# Patient Record
Sex: FEMALE | Race: WHITE | NOT HISPANIC OR LATINO | Employment: OTHER | URBAN - METROPOLITAN AREA
[De-identification: names, ages, dates, MRNs, and addresses within clinical notes are randomized per-mention and may not be internally consistent; named-entity substitution may affect disease eponyms.]

---

## 2017-01-09 DIAGNOSIS — I10 ESSENTIAL HYPERTENSION: ICD-10-CM

## 2017-01-09 NOTE — TELEPHONE ENCOUNTER
----- Message from Merissa Livingston sent at 1/9/2017 10:21 AM CST -----  Patient is requesting a medication refill.     RX name: olmesartan (BENICAR) 5 MG Tab  Quantity: 90 day supply  Directions: Twice a day.    RX name: ibandronate (BONIVA) 150 mg tablet  Quantity: 3 month supply  Directions: Take 1 tablet every 30 days.    RX name:   celecoxib (CELEBREX) 200 MG capsule   Quantity: 90 day supply  Directions: Once a day      Pharmacy name: [facility]      Phone number where patient can be reached: [phone]

## 2017-01-10 RX ORDER — CELECOXIB 200 MG/1
200 CAPSULE ORAL DAILY
Qty: 90 CAPSULE | Refills: 3 | Status: SHIPPED | OUTPATIENT
Start: 2017-01-10 | End: 2017-11-14 | Stop reason: SDUPTHER

## 2017-01-10 RX ORDER — OLMESARTAN MEDOXOMIL 5 MG/1
5 TABLET ORAL 2 TIMES DAILY
Qty: 180 TABLET | Refills: 3 | Status: SHIPPED | OUTPATIENT
Start: 2017-01-10 | End: 2018-01-30 | Stop reason: SDUPTHER

## 2017-01-10 RX ORDER — IBANDRONATE SODIUM 150 MG/1
150 TABLET, FILM COATED ORAL
Qty: 3 TABLET | Refills: 3 | Status: SHIPPED | OUTPATIENT
Start: 2017-01-10 | End: 2018-01-30 | Stop reason: SDUPTHER

## 2017-11-14 RX ORDER — CELECOXIB 200 MG/1
CAPSULE ORAL
Qty: 90 CAPSULE | Refills: 0 | Status: SHIPPED | OUTPATIENT
Start: 2017-11-14 | End: 2018-01-30 | Stop reason: SDUPTHER

## 2017-11-15 ENCOUNTER — CLINICAL SUPPORT (OUTPATIENT)
Dept: FAMILY MEDICINE | Facility: CLINIC | Age: 72
End: 2017-11-15
Payer: MEDICARE

## 2017-11-15 DIAGNOSIS — Z23 NEED FOR INFLUENZA VACCINATION: Primary | ICD-10-CM

## 2017-11-15 PROCEDURE — 90662 IIV NO PRSV INCREASED AG IM: CPT | Mod: S$GLB,,, | Performed by: FAMILY MEDICINE

## 2017-11-15 PROCEDURE — G0008 ADMIN INFLUENZA VIRUS VAC: HCPCS | Mod: S$GLB,,, | Performed by: FAMILY MEDICINE

## 2018-01-30 DIAGNOSIS — I10 ESSENTIAL HYPERTENSION: ICD-10-CM

## 2018-01-30 RX ORDER — CELECOXIB 200 MG/1
200 CAPSULE ORAL DAILY
Qty: 90 CAPSULE | Refills: 2 | Status: SHIPPED | OUTPATIENT
Start: 2018-01-30 | End: 2018-02-06 | Stop reason: SDUPTHER

## 2018-01-30 RX ORDER — OLMESARTAN MEDOXOMIL 5 MG/1
5 TABLET ORAL DAILY
Qty: 180 TABLET | Refills: 2 | Status: SHIPPED | OUTPATIENT
Start: 2018-01-30 | End: 2018-02-06 | Stop reason: SDUPTHER

## 2018-01-30 RX ORDER — IBANDRONATE SODIUM 150 MG/1
150 TABLET, FILM COATED ORAL
Qty: 3 TABLET | Refills: 3 | Status: SHIPPED | OUTPATIENT
Start: 2018-01-30 | End: 2018-02-06 | Stop reason: SDUPTHER

## 2018-01-30 NOTE — TELEPHONE ENCOUNTER
Please send to [facility]     celecoxib (CELEBREX) 200 MG capsule  Take 1 capsule (200 mg total) by mouth once daily.   Normal, Disp-90 capsule, R-2    ibandronate (BONIVA) 150 mg tablet  Take 1 tablet (150 mg total) by mouth every 30 days.   Normal, Disp-3 tablet, R-3    olmesartan (BENICAR) 5 MG Tab  Take 1 tablet (5 mg total) by mouth once daily.   Normal, Disp-180 tablet, R-2

## 2018-02-02 ENCOUNTER — TELEPHONE (OUTPATIENT)
Dept: FAMILY MEDICINE | Facility: CLINIC | Age: 73
End: 2018-02-02

## 2018-02-02 RX ORDER — AMOXICILLIN AND CLAVULANATE POTASSIUM 875; 125 MG/1; MG/1
1 TABLET, FILM COATED ORAL 2 TIMES DAILY
Qty: 20 TABLET | Refills: 0 | Status: SHIPPED | OUTPATIENT
Start: 2018-02-02 | End: 2018-02-12

## 2018-02-02 NOTE — TELEPHONE ENCOUNTER
----- Message from Rosie Mendoza sent at 2/2/2018  1:20 PM CST -----  Contact: The pt called  Pt has a cold and cough/ States she know its not the flu.  Has tried over the counter medication and nothing has helped.  Would like to get an antibiotic called in to the pharmacy.      [facility]/ [place]

## 2018-02-06 DIAGNOSIS — I10 ESSENTIAL HYPERTENSION: ICD-10-CM

## 2018-02-06 RX ORDER — IBANDRONATE SODIUM 150 MG/1
150 TABLET, FILM COATED ORAL
Qty: 3 TABLET | Refills: 3 | Status: SHIPPED | OUTPATIENT
Start: 2018-02-06 | End: 2018-09-07

## 2018-02-06 RX ORDER — CELECOXIB 200 MG/1
200 CAPSULE ORAL DAILY
Qty: 90 CAPSULE | Refills: 2 | Status: SHIPPED | OUTPATIENT
Start: 2018-02-06 | End: 2018-06-25 | Stop reason: SDUPTHER

## 2018-02-06 RX ORDER — OLMESARTAN MEDOXOMIL 5 MG/1
5 TABLET ORAL DAILY
Qty: 90 TABLET | Refills: 3 | Status: SHIPPED | OUTPATIENT
Start: 2018-02-06 | End: 2019-01-28 | Stop reason: SDUPTHER

## 2018-02-06 NOTE — TELEPHONE ENCOUNTER
----- Message from Rosie Mendoza sent at 2/6/2018 10:25 AM CST -----  Contact: The pt called  Need the following medications sent to [facility] for refills    celecoxib (CELEBREX) 200 MG capsule  ibandronate (BONIVA) 150 mg tablet  olmesartan (BENICAR) 5 MG Tab

## 2018-05-11 LAB
CHOLEST SERPL-MSCNC: 220 MG/DL (ref 0–200)
HDLC SERPL-MCNC: 79 MG/DL
LDLC SERPL CALC-MCNC: 125 MG/DL (ref 0–160)
TRIGL SERPL-MCNC: 79 MG/DL

## 2018-06-25 ENCOUNTER — TELEPHONE (OUTPATIENT)
Dept: FAMILY MEDICINE | Facility: CLINIC | Age: 73
End: 2018-06-25

## 2018-06-25 ENCOUNTER — OFFICE VISIT (OUTPATIENT)
Dept: FAMILY MEDICINE | Facility: CLINIC | Age: 73
End: 2018-06-25
Payer: MEDICARE

## 2018-06-25 VITALS
HEIGHT: 62 IN | WEIGHT: 168.19 LBS | OXYGEN SATURATION: 99 % | BODY MASS INDEX: 30.95 KG/M2 | SYSTOLIC BLOOD PRESSURE: 132 MMHG | DIASTOLIC BLOOD PRESSURE: 78 MMHG | TEMPERATURE: 98 F | HEART RATE: 71 BPM

## 2018-06-25 DIAGNOSIS — Z00.00 WELLNESS EXAMINATION: Primary | ICD-10-CM

## 2018-06-25 DIAGNOSIS — M81.0 OSTEOPOROSIS OF MULTIPLE SITES: ICD-10-CM

## 2018-06-25 DIAGNOSIS — I10 HYPERTENSION, UNSPECIFIED TYPE: ICD-10-CM

## 2018-06-25 DIAGNOSIS — F51.01 PRIMARY INSOMNIA: ICD-10-CM

## 2018-06-25 DIAGNOSIS — M54.2 NECK PAIN: ICD-10-CM

## 2018-06-25 DIAGNOSIS — R73.9 HYPERGLYCEMIA: ICD-10-CM

## 2018-06-25 PROCEDURE — 99214 OFFICE O/P EST MOD 30 MIN: CPT | Mod: S$GLB,,, | Performed by: FAMILY MEDICINE

## 2018-06-25 RX ORDER — MIRTAZAPINE 7.5 MG/1
7.5 TABLET, FILM COATED ORAL NIGHTLY
Qty: 30 TABLET | Refills: 11 | Status: SHIPPED | OUTPATIENT
Start: 2018-06-25 | End: 2018-09-07

## 2018-06-25 RX ORDER — CELECOXIB 400 MG/1
400 CAPSULE ORAL 2 TIMES DAILY
Qty: 180 CAPSULE | Refills: 1 | Status: SHIPPED | OUTPATIENT
Start: 2018-06-25 | End: 2018-06-29 | Stop reason: SDUPTHER

## 2018-06-25 RX ORDER — CELECOXIB 400 MG/1
400 CAPSULE ORAL 2 TIMES DAILY
Qty: 60 CAPSULE | Refills: 5 | Status: SHIPPED | OUTPATIENT
Start: 2018-06-25 | End: 2018-06-25 | Stop reason: SDUPTHER

## 2018-06-25 NOTE — PROGRESS NOTES
Subjective:      Patient ID: Emilia Pablo is a 73 y.o. female.    Chief Complaint: Follow-up (wellness)      HPI   Wellness; nec stiff, hurts, had fusion years ago, 2001  L spine 2008 surgery; doubled celebrex due to neck pain;  Had recent labs at [facility]; will get xray rfo neck;  Has tried muscle relaxants in past; wants to increase celebrex to 400 bid  Arthritis getting worse; still with diff sleeping; stopped benadry, now on melatonin;         Review of Systems   Constitutional: Negative.  Negative for activity change and unexpected weight change.   HENT: Negative.  Negative for hearing loss, rhinorrhea and trouble swallowing.    Eyes: Negative for discharge and visual disturbance.   Respiratory: Negative.  Negative for chest tightness and wheezing.    Cardiovascular: Negative.  Negative for chest pain and palpitations.   Gastrointestinal: Negative.  Negative for blood in stool, constipation, diarrhea and vomiting.   Endocrine: Negative.  Negative for polydipsia and polyuria.   Genitourinary: Negative.  Negative for difficulty urinating, dysuria, hematuria and menstrual problem.   Musculoskeletal: Positive for arthralgias, joint swelling, neck pain and neck stiffness.   Neurological: Negative for weakness and headaches.   Psychiatric/Behavioral: Positive for sleep disturbance. Negative for confusion and dysphoric mood.     Objective:     Physical Exam   Constitutional: She is oriented to person, place, and time. She appears well-developed and well-nourished.   HENT:   Head: Normocephalic.   Eyes: Conjunctivae and EOM are normal. Pupils are equal, round, and reactive to light.   Neck: Normal range of motion. Neck supple.   Cardiovascular: Normal rate, regular rhythm and normal heart sounds.    Pulmonary/Chest: Effort normal and breath sounds normal.   Musculoskeletal: Normal range of motion. She exhibits edema, tenderness and deformity.   Neurological: She is alert and oriented to person, place, and time. She  has normal reflexes.   Skin: Skin is warm and dry.   Psychiatric: She has a normal mood and affect. Her behavior is normal. Judgment and thought content normal.   Nursing note and vitals reviewed.    Assessment:     1. Wellness examination    2. Neck pain    3. Osteoporosis of multiple sites    4. Hypertension, unspecified type    5. Hyperglycemia    6. Primary insomnia      Plan:     New Prescriptions    MIRTAZAPINE (REMERON) 7.5 MG TAB    Take 1 tablet (7.5 mg total) by mouth every evening.     Discontinued Medications    ASPIRIN 325 MG TABLET    Take 1 tablet (325 mg total) by mouth every 12 (twelve) hours.     Modified Medications    Modified Medication Previous Medication    CELECOXIB (CELEBREX) 400 MG CAPSULE celecoxib (CELEBREX) 200 MG capsule       Take 1 capsule (400 mg total) by mouth 2 (two) times daily.    Take 1 capsule (200 mg total) by mouth once daily.       Wellness examination  -     CBC auto differential; Future; Expected date: 06/25/2018  -     Comprehensive metabolic panel; Future; Expected date: 06/25/2018  -     Hemoglobin A1c; Future  -     Lipid panel; Future  -     Sedimentation rate; Future  -     TSH; Future  -     Urinalysis; Future  -     HARINI; Future; Expected date: 06/25/2018  -     Rheumatoid factor; Future; Expected date: 06/25/2018  -     C-reactive protein; Future; Expected date: 06/25/2018    Neck pain  -     X-Ray Cervical Spine Complete 5 view; Future; Expected date: 06/25/2018  -     CBC auto differential; Future; Expected date: 06/25/2018  -     Comprehensive metabolic panel; Future; Expected date: 06/25/2018  -     Hemoglobin A1c; Future  -     Lipid panel; Future  -     Sedimentation rate; Future  -     TSH; Future  -     Urinalysis; Future  -     HARINI; Future; Expected date: 06/25/2018  -     Rheumatoid factor; Future; Expected date: 06/25/2018  -     C-reactive protein; Future; Expected date: 06/25/2018  -     Ambulatory consult to Rheumatology    Osteoporosis of multiple  sites  -     CBC auto differential; Future; Expected date: 06/25/2018  -     Comprehensive metabolic panel; Future; Expected date: 06/25/2018  -     Hemoglobin A1c; Future  -     Lipid panel; Future  -     Sedimentation rate; Future  -     TSH; Future  -     Urinalysis; Future  -     HARINI; Future; Expected date: 06/25/2018  -     Rheumatoid factor; Future; Expected date: 06/25/2018  -     C-reactive protein; Future; Expected date: 06/25/2018  -     Ambulatory consult to Rheumatology    Hypertension, unspecified type  -     CBC auto differential; Future; Expected date: 06/25/2018  -     Comprehensive metabolic panel; Future; Expected date: 06/25/2018  -     Hemoglobin A1c; Future  -     Lipid panel; Future  -     Sedimentation rate; Future  -     TSH; Future  -     Urinalysis; Future  -     HARINI; Future; Expected date: 06/25/2018  -     Rheumatoid factor; Future; Expected date: 06/25/2018  -     C-reactive protein; Future; Expected date: 06/25/2018    Hyperglycemia  -     Hemoglobin A1c; Future    Primary insomnia    Other orders  -     Discontinue: celecoxib (CELEBREX) 400 MG capsule; Take 1 capsule (400 mg total) by mouth 2 (two) times daily.  Dispense: 60 capsule; Refill: 5  -     celecoxib (CELEBREX) 400 MG capsule; Take 1 capsule (400 mg total) by mouth 2 (two) times daily.  Dispense: 180 capsule; Refill: 1  -     mirtazapine (REMERON) 7.5 MG Tab; Take 1 tablet (7.5 mg total) by mouth every evening.  Dispense: 30 tablet; Refill: 11

## 2018-06-28 ENCOUNTER — PATIENT MESSAGE (OUTPATIENT)
Dept: FAMILY MEDICINE | Facility: CLINIC | Age: 73
End: 2018-06-28

## 2018-06-28 ENCOUNTER — TELEPHONE (OUTPATIENT)
Dept: FAMILY MEDICINE | Facility: CLINIC | Age: 73
End: 2018-06-28

## 2018-06-29 NOTE — TELEPHONE ENCOUNTER
----- Message from Margaret Gonzalez sent at 6/29/2018  9:51 AM CDT -----  Patient insurance will not cover current Celebrex prescription. Pt is asking for new Rx  Please send in new Rx changing Celebrex to 400mg 1xday

## 2018-07-02 RX ORDER — CELECOXIB 400 MG/1
400 CAPSULE ORAL DAILY
Qty: 90 CAPSULE | Refills: 1 | Status: SHIPPED | OUTPATIENT
Start: 2018-07-02 | End: 2018-07-27 | Stop reason: SDUPTHER

## 2018-07-04 ENCOUNTER — TELEPHONE (OUTPATIENT)
Dept: FAMILY MEDICINE | Facility: CLINIC | Age: 73
End: 2018-07-04

## 2018-07-05 NOTE — TELEPHONE ENCOUNTER
Labs drawn at [facility] - see media for  These results    I discussed with the pt

## 2018-07-27 ENCOUNTER — TELEPHONE (OUTPATIENT)
Dept: FAMILY MEDICINE | Facility: CLINIC | Age: 73
End: 2018-07-27

## 2018-07-27 RX ORDER — CELECOXIB 200 MG/1
400 CAPSULE ORAL DAILY
Qty: 90 CAPSULE | Refills: 3 | Status: SHIPPED | OUTPATIENT
Start: 2018-07-27 | End: 2018-09-07

## 2018-07-27 NOTE — TELEPHONE ENCOUNTER
----- Message from Lulu Lynn sent at 7/27/2018  1:38 PM CDT -----  Contact: [phone] /self  Dr. Gandhi saw patient and advised she go back to the original dose of 200 mg a day.     Patient is requesting to have a refill of 90 day supply  celecoxib (CELEBREX) 200 MG capsule  TAKE 1 CAPSULE(200 MG) BY MOUTH EVERY DAY     sent to  [facility] [place], [place] - [address] AT PORTAL TO REGISTERED [facility] SITES . Please advise.

## 2018-09-06 ENCOUNTER — TELEPHONE (OUTPATIENT)
Dept: FAMILY MEDICINE | Facility: CLINIC | Age: 73
End: 2018-09-06

## 2018-09-06 NOTE — TELEPHONE ENCOUNTER
----- Message from Francie Urrutia sent at 9/6/2018  3:32 PM CDT -----  Contact: Self/ [phone]  Patient called in to speak with you about the headaches that won't go away.    Please call.

## 2018-09-07 ENCOUNTER — OFFICE VISIT (OUTPATIENT)
Dept: FAMILY MEDICINE | Facility: CLINIC | Age: 73
End: 2018-09-07
Payer: MEDICARE

## 2018-09-07 VITALS
DIASTOLIC BLOOD PRESSURE: 74 MMHG | WEIGHT: 167.44 LBS | HEIGHT: 61 IN | HEART RATE: 71 BPM | BODY MASS INDEX: 31.61 KG/M2 | OXYGEN SATURATION: 98 % | SYSTOLIC BLOOD PRESSURE: 130 MMHG | TEMPERATURE: 98 F

## 2018-09-07 DIAGNOSIS — R73.9 HYPERGLYCEMIA: ICD-10-CM

## 2018-09-07 DIAGNOSIS — M81.0 OSTEOPOROSIS OF MULTIPLE SITES: ICD-10-CM

## 2018-09-07 DIAGNOSIS — F51.01 PRIMARY INSOMNIA: ICD-10-CM

## 2018-09-07 DIAGNOSIS — I10 ESSENTIAL HYPERTENSION: Primary | ICD-10-CM

## 2018-09-07 DIAGNOSIS — G44.89 CHRONIC MIXED HEADACHE SYNDROME: ICD-10-CM

## 2018-09-07 DIAGNOSIS — M54.2 NECK PAIN: ICD-10-CM

## 2018-09-07 PROCEDURE — 99213 OFFICE O/P EST LOW 20 MIN: CPT | Mod: S$GLB,,, | Performed by: FAMILY MEDICINE

## 2018-09-07 RX ORDER — TRAMADOL HYDROCHLORIDE 50 MG/1
50 TABLET ORAL 2 TIMES DAILY PRN
Qty: 60 TABLET | Refills: 1 | Status: SHIPPED | OUTPATIENT
Start: 2018-09-07 | End: 2018-09-17

## 2018-09-07 RX ORDER — CELECOXIB 200 MG/1
200 CAPSULE ORAL DAILY
Qty: 90 CAPSULE | Refills: 3 | Status: SHIPPED | OUTPATIENT
Start: 2018-09-07 | End: 2019-10-21 | Stop reason: SDUPTHER

## 2018-09-07 RX ORDER — CYCLOBENZAPRINE HCL 5 MG
5 TABLET ORAL 2 TIMES DAILY PRN
Qty: 60 TABLET | Refills: 2 | Status: SHIPPED | OUTPATIENT
Start: 2018-09-07 | End: 2018-09-17

## 2018-09-07 RX ORDER — CELECOXIB 200 MG/1
200 CAPSULE ORAL DAILY
COMMUNITY
End: 2018-09-07 | Stop reason: SDUPTHER

## 2018-09-07 RX ORDER — DEXTROMETHORPHAN HYDROBROMIDE, GUAIFENESIN 5; 100 MG/5ML; MG/5ML
650 LIQUID ORAL EVERY 8 HOURS PRN
COMMUNITY

## 2018-09-07 NOTE — PROGRESS NOTES
Subjective:      Patient ID: Emilia Pablo is a 73 y.o. female.    Chief Complaint: Headache      HPI   Still with neck pain and left sided achey pain, deep down; hair doesn't hurt; had neck fusion; had x[facility] of c spine at [facility]  Tot chol 247 and HDL 85; pos family history of CAD  This pt has had 2 angiograms; normal  Saw Dr Gandhi, rheumatology in , told not RA, has osteoporosis and he agrees she has osteoporosis and told her to stop Boniva and will start prolia injections    Review of Systems   Constitutional: Negative.  Negative for activity change and unexpected weight change.   HENT: Positive for trouble swallowing. Negative for hearing loss and rhinorrhea.    Eyes: Negative for discharge and visual disturbance.   Respiratory: Negative.  Negative for chest tightness and wheezing.    Cardiovascular: Negative.  Negative for chest pain and palpitations.   Gastrointestinal: Positive for constipation. Negative for blood in stool, diarrhea and vomiting.   Endocrine: Negative.  Negative for polydipsia and polyuria.   Genitourinary: Negative.  Negative for difficulty urinating, dysuria, hematuria and menstrual problem.   Musculoskeletal: Positive for arthralgias and neck pain. Negative for joint swelling.   Neurological: Positive for headaches. Negative for weakness.   Psychiatric/Behavioral: Negative.  Negative for confusion and dysphoric mood.     Objective:     Physical Exam   Constitutional: She is oriented to person, place, and time. She appears well-developed and well-nourished.   HENT:   Head: Normocephalic.   Eyes: Conjunctivae and EOM are normal. Pupils are equal, round, and reactive to light.   Neck: Normal range of motion. Neck supple.   Cardiovascular: Normal rate, regular rhythm and normal heart sounds.   Pulmonary/Chest: Effort normal and breath sounds normal.   Musculoskeletal: Normal range of motion.   Neurological: She is alert and oriented to person, place, and time. She has normal reflexes.    Skin: Skin is warm and dry.   Psychiatric: She has a normal mood and affect. Her behavior is normal. Judgment and thought content normal.   Nursing note and vitals reviewed.    Assessment:     1. Essential hypertension    2. Chronic mixed headache syndrome    3. Hyperglycemia    4. Osteoporosis of multiple sites    5. Neck pain    6. Primary insomnia      Plan:   This SmartLink is deprecated. Use AVSMEDLIST instead to display the medication list for a patient.  This SmartLink is deprecated. Use AVSMEDLIST instead to display the medication list for a patient.  This SmartLink is deprecated. Use AVSMEDLIST instead to display the medication list for a patient.    Essential hypertension    Chronic mixed headache syndrome  -     CT Head Without Contrast; Future; Expected date: 09/07/2018  -     CT Cervical Spine Without Contrast; Future; Expected date: 09/07/2018    Hyperglycemia    Osteoporosis of multiple sites    Neck pain    Primary insomnia    Other orders  -     traMADol (ULTRAM) 50 mg tablet; Take 1 tablet (50 mg total) by mouth 2 (two) times daily as needed for Pain.  Dispense: 60 tablet; Refill: 1  -     cyclobenzaprine (FLEXERIL) 5 MG tablet; Take 1 tablet (5 mg total) by mouth 2 (two) times daily as needed for Muscle spasms.  Dispense: 60 tablet; Refill: 2  -     celecoxib (CELEBREX) 200 MG capsule; Take 1 capsule (200 mg total) by mouth once daily.  Dispense: 90 capsule; Refill: 3        Ct head and neck and may go back to a neurosrugeon  In mean time, head and neck pain;  Heat, not ice,     If meds help, good; if no help, to neurosrugeon

## 2018-09-14 ENCOUNTER — OFFICE VISIT (OUTPATIENT)
Dept: FAMILY MEDICINE | Facility: CLINIC | Age: 73
End: 2018-09-14
Payer: MEDICARE

## 2018-09-14 VITALS
WEIGHT: 168.56 LBS | DIASTOLIC BLOOD PRESSURE: 70 MMHG | BODY MASS INDEX: 31.83 KG/M2 | SYSTOLIC BLOOD PRESSURE: 124 MMHG | TEMPERATURE: 98 F | OXYGEN SATURATION: 98 % | HEIGHT: 61 IN | HEART RATE: 87 BPM

## 2018-09-14 DIAGNOSIS — M25.561 ACUTE PAIN OF RIGHT KNEE: Primary | ICD-10-CM

## 2018-09-14 DIAGNOSIS — M25.461 EFFUSION OF BURSA OF RIGHT KNEE: ICD-10-CM

## 2018-09-14 PROCEDURE — 99213 OFFICE O/P EST LOW 20 MIN: CPT | Mod: 25,S$GLB,, | Performed by: FAMILY MEDICINE

## 2018-09-14 PROCEDURE — 20610 DRAIN/INJ JOINT/BURSA W/O US: CPT | Mod: RT,S$GLB,, | Performed by: FAMILY MEDICINE

## 2018-09-14 RX ORDER — TRIAMCINOLONE ACETONIDE 40 MG/ML
40 INJECTION, SUSPENSION INTRA-ARTICULAR; INTRAMUSCULAR
Status: DISCONTINUED | OUTPATIENT
Start: 2018-09-14 | End: 2018-09-14 | Stop reason: HOSPADM

## 2018-09-14 RX ORDER — IBANDRONATE SODIUM 150 MG/1
150 TABLET, FILM COATED ORAL
COMMUNITY
Start: 2018-09-13 | End: 2019-12-19

## 2018-09-14 RX ADMIN — TRIAMCINOLONE ACETONIDE 40 MG: 40 INJECTION, SUSPENSION INTRA-ARTICULAR; INTRAMUSCULAR at 02:09

## 2018-09-14 NOTE — PROGRESS NOTES
Chief Complaint  Chief Complaint   Patient presents with    Joint Swelling       HPI  Emilia Pablo is a 73 y.o. female with multiple medical diagnoses as listed in the medical history and problem list that presents for right knee pain.  There was no inciting event, but she has a history of osteoarthritis.  The right knee is swollen and painful.  She is able to bear weight and has good ROM.  No fevers.  No redness.       PAST MEDICAL HISTORY:  Past Medical History:   Diagnosis Date    Arthritis     osteoarthritis    Hypertension     Abreu-Stickler syndrome        PAST SURGICAL HISTORY:  Past Surgical History:   Procedure Laterality Date    acdf      2 level    ARTHROPLASTY-HIP Right 2015    Performed by [facility]    BACK SURGERY      laminectomy    BACK SURGERY      lumbar fusion L1-S1    CARDIAC CATHETERIZATION  2016    Normal Dr Wright; see media note     SECTION      x2    EYE SURGERY Bilateral     laser   -   Nano's     JOINT REPLACEMENT Left 2013    JOINT REPLACEMENT Right 5/11/15    TONSILLECTOMY         SOCIAL HISTORY:  Social History     Socioeconomic History    Marital status:      Spouse name: Not on file    Number of children: Not on file    Years of education: Not on file    Highest education level: Not on file   Social Needs    Financial resource strain: Not on file    Food insecurity - worry: Not on file    Food insecurity - inability: Not on file    Transportation needs - medical: Not on file    Transportation needs - non-medical: Not on file   Occupational History    Not on file   Tobacco Use    Smoking status: Never Smoker   Substance and Sexual Activity    Alcohol use: Yes     Comment: occasional    Drug use: Not on file    Sexual activity: Not on file   Other Topics Concern    Not on file   Social History Narrative    Not on file       FAMILY HISTORY:  Family History   Problem Relation Age of Onset     Crohn's disease Son        ALLERGIES AND MEDICATIONS: updated and reviewed.  Review of patient's allergies indicates:  No Known Allergies  Current Outpatient Medications   Medication Sig Dispense Refill    acetaminophen (TYLENOL) 500 MG tablet Take 500 mg by mouth.      b complex vitamins capsule Take 1 capsule by mouth once daily.      celecoxib (CELEBREX) 200 MG capsule Take 1 capsule (200 mg total) by mouth once daily. 90 capsule 3    cyclobenzaprine (FLEXERIL) 5 MG tablet Take 1 tablet (5 mg total) by mouth 2 (two) times daily as needed for Muscle spasms. 60 tablet 2    fish oil-omega-3 fatty acids 300-1,000 mg capsule Take 2 g by mouth once daily.      glucosamine-chondroitin 500-400 mg tablet Take 1 tablet by mouth 2 (two) times daily.      ibandronate (BONIVA) 150 mg tablet Take 150 mg by mouth.      olmesartan (BENICAR) 5 MG Tab Take 1 tablet (5 mg total) by mouth once daily. 90 tablet 3    traMADol (ULTRAM) 50 mg tablet Take 1 tablet (50 mg total) by mouth 2 (two) times daily as needed for Pain. 60 tablet 1    vitamin D 1000 units Tab Take 2,000 Units by mouth once daily.       No current facility-administered medications for this visit.          ROS  Review of Systems   Constitutional: Negative for activity change, appetite change, chills and fatigue.   HENT: Negative for congestion, ear discharge, ear pain, rhinorrhea, sinus pain, sore throat and trouble swallowing.    Eyes: Negative for photophobia, pain, redness, itching and visual disturbance.   Respiratory: Negative for cough, chest tightness, shortness of breath and wheezing.    Cardiovascular: Negative for chest pain, palpitations and leg swelling.   Gastrointestinal: Negative for abdominal distention, abdominal pain, blood in stool, diarrhea, nausea and vomiting.   Genitourinary: Negative for dysuria, pelvic pain, vaginal bleeding, vaginal discharge and vaginal pain.   Musculoskeletal: Positive for arthralgias and joint swelling. Negative  "for back pain, gait problem and neck pain.   Skin: Negative for color change, pallor and rash.   Neurological: Negative for dizziness, tremors, weakness, light-headedness, numbness and headaches.   Psychiatric/Behavioral: Negative for agitation, behavioral problems, confusion and sleep disturbance.           PHYSICAL EXAM  Vitals:    09/14/18 1346   BP: 124/70   BP Location: Right arm   Patient Position: Sitting   Pulse: 87   Temp: 98.3 °F (36.8 °C)   TempSrc: Oral   SpO2: 98%   Weight: 76.5 kg (168 lb 8.7 oz)   Height: 5' 1" (1.549 m)    Body mass index is 31.85 kg/m².  Weight: 76.5 kg (168 lb 8.7 oz)   Height: 5' 1" (154.9 cm)     Physical Exam   Constitutional: She appears well-developed and well-nourished.   HENT:   Head: Normocephalic.   Eyes: Conjunctivae are normal.   Neck: Normal range of motion. Neck supple.   Cardiovascular: Normal rate, regular rhythm and normal heart sounds.   Pulmonary/Chest: Effort normal and breath sounds normal.   Musculoskeletal:        Right knee: She exhibits swelling and effusion. She exhibits normal range of motion, no deformity, no laceration, no erythema and no bony tenderness.   Neurological: She is alert.   Skin: Skin is warm and dry.   Psychiatric: Her behavior is normal.         Health Maintenance       Date Due Completion Date    Influenza Vaccine 08/01/2018 11/15/2017    Override on 11/16/2016: Done    Override on 10/15/2015: Done    Mammogram 10/19/2019 10/19/2017    Override on 8/1/2016: Done    Override on 4/16/2015: (N/S)    DEXA SCAN 11/16/2019 11/16/2016 (ClinicallyNA)    Override on 11/16/2016: Not Clinically Appropriate    Override on 10/1/2013: Done    Lipid Panel 05/11/2023 5/11/2018    Override on 8/12/2013: Done    Colonoscopy 06/12/2024 6/12/2014    Override on 1/1/2007: Done    TETANUS VACCINE 11/16/2026 11/16/2016 (Done)    Override on 11/16/2016: Done            Assessment & Plan      Emilia was seen today for joint swelling.    Diagnoses and all orders " for this visit:    Acute pain of right knee    Effusion of bursa of right knee          Follow-up: No Follow-up on file.

## 2018-09-14 NOTE — PROCEDURES
Large Joint Aspiration/Injection: R knee  Date/Time: 9/14/2018 2:08 PM  Performed by: Meron Billy DO  Authorized by: Meron Billy, DO     Consent Done?:  Yes (Verbal)  Indications:  Pain and joint swelling  Procedure site marked: Yes    Timeout: Prior to procedure the correct patient, procedure, and site was verified      Location:  Knee  Site:  R knee  Prep: Patient was prepped and draped in usual sterile fashion    Ultrasonic Guidance for needle placement: No  Needle size:  22 G  Approach:  Anterolateral  Medications:  40 mg triamcinolone acetonide 40 mg/mL

## 2018-09-17 ENCOUNTER — PATIENT MESSAGE (OUTPATIENT)
Dept: FAMILY MEDICINE | Facility: CLINIC | Age: 73
End: 2018-09-17

## 2018-09-17 ENCOUNTER — TELEPHONE (OUTPATIENT)
Dept: FAMILY MEDICINE | Facility: CLINIC | Age: 73
End: 2018-09-17

## 2018-09-17 RX ORDER — GABAPENTIN 300 MG/1
300 CAPSULE ORAL 2 TIMES DAILY
Qty: 60 CAPSULE | Refills: 1 | Status: SHIPPED | OUTPATIENT
Start: 2018-09-17 | End: 2019-09-30 | Stop reason: SDUPTHER

## 2018-09-17 NOTE — TELEPHONE ENCOUNTER
----- Message from Laura Rios sent at 9/17/2018  3:00 PM CDT -----  Contact: [phone]  Patient requested to speak with the nurse about a personal matter. Please call.

## 2018-09-17 NOTE — TELEPHONE ENCOUNTER
Pt states that she has shingles on the left side - she was seen at the urgent care on Sat.     She was rx'd acyclovir 800mg 5 x day x 10 days.    She wants to know if there is anything else she can take for the itching/burning?  A Cream maybe?  Please advise.

## 2018-09-24 PROBLEM — Z00.00 WELLNESS EXAMINATION: Status: RESOLVED | Noted: 2018-06-25 | Resolved: 2018-09-24

## 2018-10-03 ENCOUNTER — PATIENT MESSAGE (OUTPATIENT)
Dept: FAMILY MEDICINE | Facility: CLINIC | Age: 73
End: 2018-10-03

## 2018-10-03 ENCOUNTER — TELEPHONE (OUTPATIENT)
Dept: INTERNAL MEDICINE | Facility: CLINIC | Age: 73
End: 2018-10-03

## 2018-10-03 DIAGNOSIS — M25.561 ACUTE PAIN OF RIGHT KNEE: Primary | ICD-10-CM

## 2018-10-03 NOTE — TELEPHONE ENCOUNTER
----- Message from Beth Sloan sent at 10/3/2018 10:02 AM CDT -----  Contact: [phone]/self  Patient would like to be seen sooner than the next available appointment for knee pain. She only wants to see Dr. Gibson (preferably tomorrow).

## 2018-10-03 NOTE — TELEPHONE ENCOUNTER
----- Message from Beth Sloan sent at 10/3/2018 10:02 AM CDT -----  Contact: [phone]/self  Patient would like to be seen sooner than the next available appointment for knee pain. She only wants to see Dr. Gibson (preferably tomorrow).

## 2018-10-05 ENCOUNTER — TELEPHONE (OUTPATIENT)
Dept: FAMILY MEDICINE | Facility: CLINIC | Age: 73
End: 2018-10-05

## 2018-10-05 DIAGNOSIS — M19.90 ARTHRITIS: Primary | ICD-10-CM

## 2018-10-05 RX ORDER — CIPROFLOXACIN 500 MG/1
500 TABLET ORAL 2 TIMES DAILY
Qty: 14 TABLET | Refills: 0 | Status: SHIPPED | OUTPATIENT
Start: 2018-10-05 | End: 2019-12-19

## 2018-10-05 NOTE — TELEPHONE ENCOUNTER
----- Message from Beth Sloan sent at 10/5/2018 11:55 AM CDT -----  Contact: [phone]/self  Patient requesting to speak with you regarding xray results.

## 2018-10-05 NOTE — TELEPHONE ENCOUNTER
Spoke with patient on the phone.  Had a steroid injection 2 weeks ago and this helped her knee tremendously for a short time.  Now, however, she is having increased pain.  The knee is red and seems swollen.  She is concerned about infection.   I will call in an antibiotic and she will call on Monday to let us know how she is doing.

## 2018-10-05 NOTE — TELEPHONE ENCOUNTER
----- Message from Lulu Lynn sent at 10/5/2018  2:51 PM CDT -----  Contact: [phone]/self  Patient is requesting a call back regarding a steroid injection in the knee. Thanks

## 2018-10-05 NOTE — TELEPHONE ENCOUNTER
Called pt back and she states that she would like to know if Dr Gibson received the xray results I said that we did but I was not sure that Dr Gibson reviewed the results just yet but I have printed them and placed in his box for review I will also send him a message. Pt is requesting a call back from Dr Gibson to discuss a few other things. I explained he was not in this afternoon. But I will check with him when he gets in

## 2018-10-08 ENCOUNTER — TELEPHONE (OUTPATIENT)
Dept: FAMILY MEDICINE | Facility: CLINIC | Age: 73
End: 2018-10-08

## 2018-10-08 NOTE — TELEPHONE ENCOUNTER
----- Message from Beth Sloan sent at 10/8/2018  4:09 PM CDT -----  Contact: [phone]/self  Patient is calling to give you pharmacy information. Justin Quesada.

## 2018-10-08 NOTE — TELEPHONE ENCOUNTER
----- Message from Laura Rios sent at 10/8/2018 12:20 PM CDT -----  Contact: [phone]  Patient advised [facility] lab did not receive the lab orders for her blood work. Please call.

## 2018-10-08 NOTE — TELEPHONE ENCOUNTER
Called pt back and explained that I faxed over the order this morning to the number we had. I asked if she had a different number it was supposed to go to she said no but she would check and for me to call her back in a few mins. I called back no answer left message on VM to call office back

## 2018-10-09 NOTE — TELEPHONE ENCOUNTER
----- Message from Erma Henao sent at 10/9/2018  1:15 PM CDT -----  Contact: Self [phone]  Patient would like to speak with you about getting test results.

## 2018-10-09 NOTE — TELEPHONE ENCOUNTER
Spoke with pt. She stated that she'd like to obtain lab results that were drawn on yesterday, 10/8/18. I informed pt that I would contact [facility] to obtain lab report. Spoke with Rubi (Medical Records), and was advised that she will fax report for review in a few minutes.    Currently awaiting report.

## 2018-10-09 NOTE — TELEPHONE ENCOUNTER
Why? For what reason do I need to know that [facility]?  More detailed messages, please, such as, send------medicine to [facility], or something like that.

## 2018-10-09 NOTE — TELEPHONE ENCOUNTER
----- Message from Laura Rios sent at 10/9/2018 10:46 AM CDT -----  Contact: [phone]  Patient would like to get test results. Please call.

## 2018-10-09 NOTE — TELEPHONE ENCOUNTER
Please see scanned knee xray report from [facility]. Pt would like to obtain results. Thanks

## 2018-10-10 ENCOUNTER — PATIENT MESSAGE (OUTPATIENT)
Dept: FAMILY MEDICINE | Facility: CLINIC | Age: 73
End: 2018-10-10

## 2018-11-16 ENCOUNTER — TELEPHONE (OUTPATIENT)
Dept: FAMILY MEDICINE | Facility: CLINIC | Age: 73
End: 2018-11-16

## 2018-11-16 NOTE — TELEPHONE ENCOUNTER
X-ray done at [facility] / faxed to Dr. Flowers's office       ----- Message from Laura Rios sent at 11/16/2018 10:30 AM CST -----  Contact: [phone]  Patient requested the report from the xray of her right knee that was done on 10/04 be faxed to Dr. Lionel Gandhi @ [phone].

## 2019-01-16 ENCOUNTER — TELEPHONE (OUTPATIENT)
Dept: ADMINISTRATIVE | Facility: HOSPITAL | Age: 74
End: 2019-01-16

## 2019-01-28 DIAGNOSIS — I10 ESSENTIAL HYPERTENSION: ICD-10-CM

## 2019-01-28 RX ORDER — OLMESARTAN MEDOXOMIL 5 MG/1
5 TABLET ORAL DAILY
Qty: 90 TABLET | Refills: 3 | Status: SHIPPED | OUTPATIENT
Start: 2019-01-28 | End: 2019-12-05 | Stop reason: SDUPTHER

## 2019-01-28 NOTE — TELEPHONE ENCOUNTER
----- Message from Chavez Umaña sent at 1/28/2019  9:12 AM CST -----  Contact: [phone]/self     Patient is requesting a medication refill.       RX name: olmesartan (BENICAR) 5 MG Tab  Strength: 5 MG tab  Quantity: 90 tablets  Directions:  Take 1 tablet (5 mg total) by mouth once daily      Pharmacy name: [facility] - [place], [place] - [address] AT PORTAL TO [facility]

## 2019-05-19 ENCOUNTER — TELEPHONE (OUTPATIENT)
Dept: FAMILY MEDICINE | Facility: CLINIC | Age: 74
End: 2019-05-19

## 2019-05-21 ENCOUNTER — TELEPHONE (OUTPATIENT)
Dept: FAMILY MEDICINE | Facility: CLINIC | Age: 74
End: 2019-05-21

## 2019-05-21 NOTE — TELEPHONE ENCOUNTER
----- Message from Chavez Umaña sent at 5/21/2019  8:15 AM CDT -----  Contact: [phone]/self  Patient is returning your call.  Please call back to assist at [phone]

## 2019-05-21 NOTE — TELEPHONE ENCOUNTER
Called pt back and in formed her that her labs were normal and she asked that a copy be sent to her.

## 2019-08-13 ENCOUNTER — TELEPHONE (OUTPATIENT)
Dept: FAMILY MEDICINE | Facility: CLINIC | Age: 74
End: 2019-08-13

## 2019-08-13 RX ORDER — METHYLPREDNISOLONE 4 MG/1
TABLET ORAL
Qty: 1 PACKAGE | Refills: 0 | Status: SHIPPED | OUTPATIENT
Start: 2019-08-13 | End: 2019-12-19

## 2019-08-13 NOTE — TELEPHONE ENCOUNTER
----- Message from Lulu Lynn sent at 8/13/2019 11:02 AM CDT -----  Contact: [phone]/self  Type:  Needs Medical Advice    Who Called:  self  Symptoms (please be specific):  Shoulder and shoulder blade on her left side  How long has patient had these symptoms:    Please call her to see if she needs to come in or have x-rays.

## 2019-08-13 NOTE — TELEPHONE ENCOUNTER
----- Message from Yahaira Graves sent at 8/13/2019 11:24 AM CDT -----  Contact: self, [phone] ([phone])  Patient called in returning your call.

## 2019-08-13 NOTE — TELEPHONE ENCOUNTER
Called pt back and she states that her left shoulder blade and arm toward the back are hurting and she said she was walking in to [place] and she fell and hit the cement on June 28th she doesn't think anything is broken but she's not sure and she has a cervical fusion and she is wondering if she jolted the plate so she would like to know if she should get an xray or something and she states its a burning pain she's been taking flexeril and tramadol from a prior incident and its not helping

## 2019-08-13 NOTE — TELEPHONE ENCOUNTER
Called pt back no answer left message on VM to call office back Would you like her to have an xray first and then come in

## 2019-09-12 ENCOUNTER — PATIENT OUTREACH (OUTPATIENT)
Dept: ADMINISTRATIVE | Facility: HOSPITAL | Age: 74
End: 2019-09-12

## 2019-09-16 ENCOUNTER — TELEPHONE (OUTPATIENT)
Dept: FAMILY MEDICINE | Facility: CLINIC | Age: 74
End: 2019-09-16

## 2019-09-16 NOTE — TELEPHONE ENCOUNTER
Spoke to pt and she was given an appointment with  and the appointment slip was mailed to pt home. Pt was pleased.

## 2019-09-16 NOTE — TELEPHONE ENCOUNTER
----- Message from Leia Madera sent at 9/16/2019  3:42 PM CDT -----  No [phone]  Patient needs to schedule her yearly physical and wants to speak with the nurse about other issues she is having.

## 2019-09-30 NOTE — TELEPHONE ENCOUNTER
----- Message from Lulu Lynn sent at 9/30/2019 10:58 AM CDT -----  Contact: [phone] /self  Patient has used all of her gabapentin (NEURONTIN) 300 MG capsules she was using for shingles. She was taking them 1 time a day as needed for nerve pain.   She is requesting a new prescription be sent to \[facility]\"" [facility] - [place] - [address]

## 2019-10-01 RX ORDER — GABAPENTIN 300 MG/1
300 CAPSULE ORAL 2 TIMES DAILY
Qty: 60 CAPSULE | Refills: 1 | Status: SHIPPED | OUTPATIENT
Start: 2019-10-01 | End: 2019-12-19 | Stop reason: SDUPTHER

## 2019-10-07 ENCOUNTER — PATIENT OUTREACH (OUTPATIENT)
Dept: ADMINISTRATIVE | Facility: HOSPITAL | Age: 74
End: 2019-10-07

## 2019-10-22 RX ORDER — CELECOXIB 200 MG/1
CAPSULE ORAL
Qty: 90 CAPSULE | Refills: 3 | Status: SHIPPED | OUTPATIENT
Start: 2019-10-22 | End: 2020-03-10 | Stop reason: SDUPTHER

## 2019-11-18 ENCOUNTER — TELEPHONE (OUTPATIENT)
Dept: FAMILY MEDICINE | Facility: CLINIC | Age: 74
End: 2019-11-18

## 2019-11-18 NOTE — TELEPHONE ENCOUNTER
----- Message from Lulu Lynn sent at 11/18/2019  1:13 PM CST -----  Contact: [phone]/self  Type:  Sooner Apoointment Request    Name of Caller: self  When is the first available appointment? 12/12  Symptoms: Wellness  Would the patient rather a call back or a response via [facility]?  call  Best Call Back Number:   Additional Information:  She has a lot of appointments that week and would like to change it to something sooner

## 2019-11-18 NOTE — TELEPHONE ENCOUNTER
Spoke with pt. Rescheduled appt to a more convenient date per pt's request. Itinerary mailed to pt's address on file.

## 2019-12-03 RX ORDER — CYCLOBENZAPRINE HCL 5 MG
TABLET ORAL
Qty: 60 TABLET | Refills: 5 | Status: SHIPPED | OUTPATIENT
Start: 2019-12-03 | End: 2020-08-03 | Stop reason: SDUPTHER

## 2019-12-04 ENCOUNTER — PATIENT OUTREACH (OUTPATIENT)
Dept: ADMINISTRATIVE | Facility: HOSPITAL | Age: 74
End: 2019-12-04

## 2019-12-05 ENCOUNTER — TELEPHONE (OUTPATIENT)
Dept: FAMILY MEDICINE | Facility: CLINIC | Age: 74
End: 2019-12-05

## 2019-12-05 DIAGNOSIS — I10 ESSENTIAL HYPERTENSION: ICD-10-CM

## 2019-12-05 NOTE — TELEPHONE ENCOUNTER
----- Message from Yina Johnston sent at 12/5/2019 11:37 AM CST -----  Contact: self / [phone]  Has questions about a Cortisone Injection. Please advise

## 2019-12-05 NOTE — TELEPHONE ENCOUNTER
Spoke to pt and she was notified per  she could have a kenalog injection. Pt will come in tomorrow morning.

## 2019-12-05 NOTE — TELEPHONE ENCOUNTER
Pt has a pulled muscle in her back (after putting up her [place] decoration) and has been taking old Flexeril from a prior back issue. She would like to know if she can come in to get a steroid shot to see if it would help with the discomfort she's experiencing.

## 2019-12-06 ENCOUNTER — CLINICAL SUPPORT (OUTPATIENT)
Dept: FAMILY MEDICINE | Facility: CLINIC | Age: 74
End: 2019-12-06
Payer: MEDICARE

## 2019-12-06 DIAGNOSIS — M54.9 BACK PAIN, UNSPECIFIED BACK LOCATION, UNSPECIFIED BACK PAIN LATERALITY, UNSPECIFIED CHRONICITY: Primary | ICD-10-CM

## 2019-12-06 PROCEDURE — 96372 PR INJECTION,THERAP/PROPH/DIAG2ST, IM OR SUBCUT: ICD-10-PCS | Mod: S$GLB,,, | Performed by: FAMILY MEDICINE

## 2019-12-06 PROCEDURE — 96372 THER/PROPH/DIAG INJ SC/IM: CPT | Mod: S$GLB,,, | Performed by: FAMILY MEDICINE

## 2019-12-06 RX ORDER — OLMESARTAN MEDOXOMIL 5 MG/1
TABLET ORAL
Qty: 90 TABLET | Refills: 3 | Status: SHIPPED | OUTPATIENT
Start: 2019-12-06 | End: 2020-03-10 | Stop reason: SDUPTHER

## 2019-12-06 RX ORDER — TRIAMCINOLONE ACETONIDE 40 MG/ML
80 INJECTION, SUSPENSION INTRA-ARTICULAR; INTRAMUSCULAR ONCE
Status: COMPLETED | OUTPATIENT
Start: 2019-12-06 | End: 2019-12-06

## 2019-12-06 RX ADMIN — TRIAMCINOLONE ACETONIDE 80 MG: 40 INJECTION, SUSPENSION INTRA-ARTICULAR; INTRAMUSCULAR at 02:12

## 2019-12-19 ENCOUNTER — OFFICE VISIT (OUTPATIENT)
Dept: FAMILY MEDICINE | Facility: CLINIC | Age: 74
End: 2019-12-19
Payer: MEDICARE

## 2019-12-19 VITALS
HEART RATE: 87 BPM | SYSTOLIC BLOOD PRESSURE: 120 MMHG | WEIGHT: 163.94 LBS | BODY MASS INDEX: 27.99 KG/M2 | DIASTOLIC BLOOD PRESSURE: 68 MMHG | OXYGEN SATURATION: 96 % | TEMPERATURE: 99 F | HEIGHT: 64 IN

## 2019-12-19 DIAGNOSIS — G44.89 CHRONIC MIXED HEADACHE SYNDROME: ICD-10-CM

## 2019-12-19 DIAGNOSIS — Z00.00 WELLNESS EXAMINATION: Primary | ICD-10-CM

## 2019-12-19 DIAGNOSIS — M54.2 NECK PAIN: ICD-10-CM

## 2019-12-19 DIAGNOSIS — Z82.49 FAMILY HISTORY OF EARLY CAD: ICD-10-CM

## 2019-12-19 DIAGNOSIS — I10 ESSENTIAL HYPERTENSION: ICD-10-CM

## 2019-12-19 DIAGNOSIS — M81.0 OSTEOPOROSIS OF MULTIPLE SITES: ICD-10-CM

## 2019-12-19 DIAGNOSIS — H90.5 SENSORINEURAL HEARING LOSS (SNHL), UNSPECIFIED LATERALITY: ICD-10-CM

## 2019-12-19 DIAGNOSIS — F51.01 PRIMARY INSOMNIA: ICD-10-CM

## 2019-12-19 DIAGNOSIS — K22.2 ESOPHAGEAL STRICTURE: ICD-10-CM

## 2019-12-19 DIAGNOSIS — R73.9 HYPERGLYCEMIA: ICD-10-CM

## 2019-12-19 PROCEDURE — 1159F PR MEDICATION LIST DOCUMENTED IN MEDICAL RECORD: ICD-10-PCS | Mod: S$GLB,,, | Performed by: FAMILY MEDICINE

## 2019-12-19 PROCEDURE — 1125F AMNT PAIN NOTED PAIN PRSNT: CPT | Mod: S$GLB,,, | Performed by: FAMILY MEDICINE

## 2019-12-19 PROCEDURE — 90662 FLU VACCINE - HIGH DOSE (65+) PRESERVATIVE FREE IM: ICD-10-PCS | Mod: S$GLB,,, | Performed by: FAMILY MEDICINE

## 2019-12-19 PROCEDURE — 1125F PR PAIN SEVERITY QUANTIFIED, PAIN PRESENT: ICD-10-PCS | Mod: S$GLB,,, | Performed by: FAMILY MEDICINE

## 2019-12-19 PROCEDURE — 99214 OFFICE O/P EST MOD 30 MIN: CPT | Mod: 25,S$GLB,, | Performed by: FAMILY MEDICINE

## 2019-12-19 PROCEDURE — G0008 ADMIN INFLUENZA VIRUS VAC: HCPCS | Mod: S$GLB,,, | Performed by: FAMILY MEDICINE

## 2019-12-19 PROCEDURE — 1159F MED LIST DOCD IN RCRD: CPT | Mod: S$GLB,,, | Performed by: FAMILY MEDICINE

## 2019-12-19 PROCEDURE — 99214 PR OFFICE/OUTPT VISIT, EST, LEVL IV, 30-39 MIN: ICD-10-PCS | Mod: 25,S$GLB,, | Performed by: FAMILY MEDICINE

## 2019-12-19 PROCEDURE — 90662 IIV NO PRSV INCREASED AG IM: CPT | Mod: S$GLB,,, | Performed by: FAMILY MEDICINE

## 2019-12-19 PROCEDURE — G0008 FLU VACCINE - HIGH DOSE (65+) PRESERVATIVE FREE IM: ICD-10-PCS | Mod: S$GLB,,, | Performed by: FAMILY MEDICINE

## 2019-12-19 RX ORDER — PANTOPRAZOLE SODIUM 40 MG/1
40 TABLET, DELAYED RELEASE ORAL DAILY
COMMUNITY
End: 2022-11-17 | Stop reason: ALTCHOICE

## 2019-12-19 RX ORDER — GABAPENTIN 300 MG/1
300 CAPSULE ORAL NIGHTLY
Qty: 90 CAPSULE | Refills: 3 | Status: SHIPPED | OUTPATIENT
Start: 2019-12-19 | End: 2020-06-04 | Stop reason: SDUPTHER

## 2019-12-19 NOTE — PROGRESS NOTES
"Subjective:      Patient ID: Emilia Pablo is a 74 y.o. female.    Chief Complaint: Annual Exam      Vitals:    12/19/19 0951   BP: 120/68   Pulse: 87   Temp: 98.6 °F (37 °C)   TempSrc: Oral   SpO2: 96%   Weight: 74.3 kg (163 lb 14.6 oz)   Height: 5' 4" (1.626 m)        HPI   Wellness; had EGD with Juan; had colonoscopy also; colon normal; EGD inflammed, on protonix; didin't stop anymeds; had 2 upper scopes   Now on protonix; had low dose flu, normal mammogram  Has osteoarthritis, not RA  Gets prolia q 6 months for osteoporosis; no reaction    Review of Systems   Constitutional: Negative.    HENT: Positive for hearing loss and trouble swallowing.    Respiratory: Negative.    Cardiovascular: Negative.    Gastrointestinal: Negative.    Endocrine: Negative.    Genitourinary: Negative.    Musculoskeletal: Positive for arthralgias.   Psychiatric/Behavioral: Negative.    All other systems reviewed and are negative.    Objective:     Physical Exam   Constitutional: She is oriented to person, place, and time. She appears well-developed and well-nourished.   HENT:   Head: Normocephalic.   Eyes: Pupils are equal, round, and reactive to light. Conjunctivae and EOM are normal.   Neck: Normal range of motion. Neck supple.   Cardiovascular: Normal rate, regular rhythm and normal heart sounds.   Pulmonary/Chest: Effort normal and breath sounds normal.   Musculoskeletal: Normal range of motion.   Neurological: She is alert and oriented to person, place, and time. She has normal reflexes.   Skin: Skin is warm and dry.   Psychiatric: She has a normal mood and affect. Her behavior is normal. Judgment and thought content normal.   Nursing note and vitals reviewed.    Assessment:     1. Wellness examination    2. Esophageal stricture    3. Chronic mixed headache syndrome    4. Essential hypertension    5. Hyperglycemia    6. Osteoporosis of multiple sites    7. Neck pain    8. Primary insomnia    9. Family history of early CAD    10. " Sensorineural hearing loss (SNHL), unspecified laterality      Plan:        Medication List           Accurate as of December 19, 2019 11:59 PM. If you have any questions, ask your nurse or doctor.               CHANGE how you take these medications    gabapentin 300 MG capsule  Commonly known as:  NEURONTIN  Take 1 capsule (300 mg total) by mouth every evening. For shingles pain  What changed:  when to take this  Changed by:  Dave Gibson MD        CONTINUE taking these medications    acetaminophen 500 MG tablet  Commonly known as:  TYLENOL     b complex vitamins capsule     celecoxib 200 MG capsule  Commonly known as:  CeleBREX  TAKE 1 CAPSULE ONCE DAILY     cyclobenzaprine 5 MG tablet  Commonly known as:  FLEXERIL  take 1 BY MOUTH TWICE DAILY AS NEEDED FOR MUSCLE SPASMS     fish oil-omega-3 fatty acids 300-1,000 mg capsule     glucosamine-chondroitin 500-400 mg tablet     olmesartan 5 MG Tab  Commonly known as:  BENICAR  TAKE 1 TABLET DAILY     pantoprazole 40 MG tablet  Commonly known as:  PROTONIX     PROLIA SUBQ     vitamin D 1000 units Tab  Commonly known as:  VITAMIN D3        STOP taking these medications    ciprofloxacin HCl 500 MG tablet  Commonly known as:  CIPRO  Stopped by:  Dave Gibson MD     ibandronate 150 mg tablet  Commonly known as:  BONIVA  Stopped by:  Dave Gibson MD     methylPREDNISolone 4 mg tablet  Commonly known as:  MEDROL DOSEPACK  Stopped by:  Dave Gibson MD           Where to Get Your Medications      These medications were [facility] - [place], [place] - [address] AT [address] [place] [id number] [facility], [facility] [address]    Phone:  [phone]   · gabapentin 300 MG capsule       Wellness examination  -     Cancel: CBC auto differential; Future; Expected date: 12/19/2019  -     Cancel: TSH; Future  -     Cancel: Urinalysis; Future  -     Cancel: Vitamin D; Future  -     Vitamin D; Future  -     Urinalysis;  Future  -     TSH; Future  -     CBC auto differential; Future; Expected date: 12/19/2019    Esophageal stricture  -     Cancel: CBC auto differential; Future; Expected date: 12/19/2019  -     Cancel: TSH; Future  -     Cancel: Urinalysis; Future  -     Cancel: Vitamin D; Future  -     Vitamin D; Future  -     Urinalysis; Future  -     TSH; Future  -     CBC auto differential; Future; Expected date: 12/19/2019    Chronic mixed headache syndrome  -     Cancel: CBC auto differential; Future; Expected date: 12/19/2019  -     Cancel: TSH; Future  -     Cancel: Urinalysis; Future  -     Cancel: Vitamin D; Future  -     Vitamin D; Future  -     Urinalysis; Future  -     TSH; Future  -     CBC auto differential; Future; Expected date: 12/19/2019    Essential hypertension  -     Cancel: CBC auto differential; Future; Expected date: 12/19/2019  -     Cancel: TSH; Future  -     Cancel: Urinalysis; Future  -     Cancel: Vitamin D; Future  -     Vitamin D; Future  -     Urinalysis; Future  -     TSH; Future  -     CBC auto differential; Future; Expected date: 12/19/2019    Hyperglycemia  -     Cancel: CBC auto differential; Future; Expected date: 12/19/2019  -     Cancel: TSH; Future  -     Cancel: Urinalysis; Future  -     Cancel: Vitamin D; Future  -     Vitamin D; Future  -     Urinalysis; Future  -     TSH; Future  -     CBC auto differential; Future; Expected date: 12/19/2019    Osteoporosis of multiple sites  -     Cancel: CBC auto differential; Future; Expected date: 12/19/2019  -     Cancel: TSH; Future  -     Cancel: Urinalysis; Future  -     Cancel: Vitamin D; Future  -     Vitamin D; Future  -     Urinalysis; Future  -     TSH; Future  -     CBC auto differential; Future; Expected date: 12/19/2019    Neck pain  -     Cancel: CBC auto differential; Future; Expected date: 12/19/2019  -     Cancel: TSH; Future  -     Cancel: Urinalysis; Future  -     Cancel: Vitamin D; Future  -     Vitamin D; Future  -     Urinalysis;  Future  -     TSH; Future  -     CBC auto differential; Future; Expected date: 12/19/2019    Primary insomnia  -     Cancel: CBC auto differential; Future; Expected date: 12/19/2019  -     Cancel: TSH; Future  -     Cancel: Urinalysis; Future  -     Cancel: Vitamin D; Future  -     Vitamin D; Future  -     Urinalysis; Future  -     TSH; Future  -     CBC auto differential; Future; Expected date: 12/19/2019    Family history of early CAD  -     Cancel: CBC auto differential; Future; Expected date: 12/19/2019  -     Cancel: TSH; Future  -     Cancel: Urinalysis; Future  -     Cancel: Vitamin D; Future  -     Vitamin D; Future  -     Urinalysis; Future  -     TSH; Future  -     CBC auto differential; Future; Expected date: 12/19/2019    Sensorineural hearing loss (SNHL), unspecified laterality  -     PURE TONE AUDIOMETRY; Future    Other orders  -     Influenza - High Dose (65+) (PF) (IM)  -     gabapentin (NEURONTIN) 300 MG capsule; Take 1 capsule (300 mg total) by mouth every evening. For shingles pain  Dispense: 90 capsule; Refill: 3

## 2019-12-31 ENCOUNTER — TELEPHONE (OUTPATIENT)
Dept: FAMILY MEDICINE | Facility: CLINIC | Age: 74
End: 2019-12-31

## 2019-12-31 NOTE — TELEPHONE ENCOUNTER
----- Message from Lulu Lynn sent at 12/31/2019 11:36 AM CST -----  Contact: [phone] /self  Type:  Test Results    Who Called:  self  Name of Test (Lab/Mammo/Etc):  Lab work  Date of Test:  After 12/19 appointment  Ordering Provider:  Paige  Where the test was performed:  [facility]  Would the patient rather a call back or a response via [facility]?  Call   Best Call Back Number:    Additional Information:           please review results in media

## 2020-01-17 ENCOUNTER — TELEPHONE (OUTPATIENT)
Dept: FAMILY MEDICINE | Facility: CLINIC | Age: 75
End: 2020-01-17

## 2020-01-17 NOTE — TELEPHONE ENCOUNTER
----- Message from Kaila Washburn sent at 1/17/2020  9:23 AM CST -----  Contact: self  Patient is requesting a call back concerning mailing her a copy of her last blood work. Please call

## 2020-03-10 DIAGNOSIS — I10 ESSENTIAL HYPERTENSION: ICD-10-CM

## 2020-03-10 RX ORDER — OLMESARTAN MEDOXOMIL 5 MG/1
5 TABLET ORAL DAILY
Qty: 90 TABLET | Refills: 3 | Status: SHIPPED | OUTPATIENT
Start: 2020-03-10 | End: 2020-12-18

## 2020-03-10 RX ORDER — CELECOXIB 200 MG/1
200 CAPSULE ORAL DAILY
Qty: 90 CAPSULE | Refills: 3 | Status: SHIPPED | OUTPATIENT
Start: 2020-03-10 | End: 2021-01-10

## 2020-03-10 NOTE — TELEPHONE ENCOUNTER
----- Message from Isi Llanes sent at 3/10/2020 11:45 AM CDT -----  Contact: Self [phone]  Patient is calling to get refills on her medication sent to [facility] [phone]     1. celecoxib (CELEBREX) 200 MG capsule 90 capsule     2. olmesartan (BENICAR) 5 MG Tab 90 tablet

## 2020-06-05 RX ORDER — GABAPENTIN 300 MG/1
300 CAPSULE ORAL NIGHTLY
Qty: 90 CAPSULE | Refills: 3 | Status: SHIPPED | OUTPATIENT
Start: 2020-06-05 | End: 2021-08-04

## 2020-06-05 RX ORDER — GABAPENTIN 300 MG/1
300 CAPSULE ORAL NIGHTLY
Qty: 30 CAPSULE | Refills: 0 | Status: SHIPPED | OUTPATIENT
Start: 2020-06-05 | End: 2020-12-28

## 2020-08-03 NOTE — TELEPHONE ENCOUNTER
Patient states the pain is in shoulder pain. Patient states she was prescribed Flexeril the last time she had this. Patient would like a recommendation for a neurosurgeon; states its not that she wants to go see one right now because she may be over thinking things.        ----- Message from Isi Llanes sent at 8/3/2020  2:13 PM CDT -----  Regarding: Refills  Contact: [phone]  Patient is calling to get refills on her medication sent to [facility] [address] [place] [address] [facility] [phone] (Phone) [phone] (Fax. Patient would like to talk to nurse personal questions.    1. cyclobenzaprine (FLEXERIL) 5 MG tablet 60 tablet

## 2020-08-04 RX ORDER — CYCLOBENZAPRINE HCL 5 MG
TABLET ORAL
Qty: 60 TABLET | Refills: 5 | Status: SHIPPED | OUTPATIENT
Start: 2020-08-04 | End: 2022-10-27 | Stop reason: SDUPTHER

## 2020-09-29 ENCOUNTER — PATIENT MESSAGE (OUTPATIENT)
Dept: OTHER | Facility: OTHER | Age: 75
End: 2020-09-29

## 2020-10-20 ENCOUNTER — TELEPHONE (OUTPATIENT)
Dept: FAMILY MEDICINE | Facility: CLINIC | Age: 75
End: 2020-10-20

## 2020-10-20 DIAGNOSIS — Z00.00 WELLNESS EXAMINATION: Primary | ICD-10-CM

## 2020-10-20 DIAGNOSIS — R73.9 HYPERGLYCEMIA: ICD-10-CM

## 2020-10-20 DIAGNOSIS — I10 ESSENTIAL HYPERTENSION: ICD-10-CM

## 2020-10-20 DIAGNOSIS — M81.0 OSTEOPOROSIS OF MULTIPLE SITES: ICD-10-CM

## 2020-10-20 DIAGNOSIS — Z12.31 ENCOUNTER FOR SCREENING MAMMOGRAM FOR MALIGNANT NEOPLASM OF BREAST: ICD-10-CM

## 2020-10-20 DIAGNOSIS — Z82.49 FAMILY HISTORY OF EARLY CAD: ICD-10-CM

## 2020-10-20 NOTE — TELEPHONE ENCOUNTER
----- Message from Hussain Pineda sent at 10/20/2020  4:18 PM CDT -----  Type:  Mammogram    Caller is requesting to schedule their annual mammogram appointment.  Order is not listed in EPIC.  Please enter order and contact patient to schedule.  Name of Caller: Emilia  Where would they like the mammogram performed? [facility]  Would the patient rather a call back or a response via [facility]? Call back  Best Call Back Number: [phone]  Additional Information: none

## 2020-10-20 NOTE — TELEPHONE ENCOUNTER
Patient requesting annual lab orders and mammogram orders faxed to [facility].     ----- Message from Hussain Pineda sent at 10/20/2020  4:09 PM CDT -----  Type:  Needs Medical Advice    Who Called: Emilia  Symptoms (please be specific): pt wants to know if she should go get her annual blood work done now before seeing Dr. Gibson   How long has patient had these symptoms:  unknown  Pharmacy name and phone #:  n/a  Would the patient rather a call back or a response via [facility]? Call back  Best Call Back Number: [phone]  Additional Information: none

## 2020-10-26 ENCOUNTER — TELEPHONE (OUTPATIENT)
Dept: FAMILY MEDICINE | Facility: CLINIC | Age: 75
End: 2020-10-26

## 2020-10-26 NOTE — TELEPHONE ENCOUNTER
Lab and mammogram orders re-faxed to [facility]     ----- Message from Josefa Cosme sent at 10/26/2020  9:54 AM CDT -----  Contact: JUANY CUNNINGHAM [[id number]]  Type:  Patient Requesting Call    Who Called: Juany Bryson Call Back Number: [phone]  Additional Information:  pt is at [facility], there are no labs in her chart and pt has annual on 11/3

## 2020-11-02 ENCOUNTER — TELEPHONE (OUTPATIENT)
Dept: FAMILY MEDICINE | Facility: CLINIC | Age: 75
End: 2020-11-02

## 2020-11-02 NOTE — TELEPHONE ENCOUNTER
----- Message from Rosie Mendoza sent at 10/30/2020  3:00 PM CDT -----  Regarding: labs  Contact: fax  Dr. Gibson    Please review document  scanned into patient's media.         Re: [facility] / Labs

## 2020-11-03 ENCOUNTER — IMMUNIZATION (OUTPATIENT)
Dept: FAMILY MEDICINE | Facility: CLINIC | Age: 75
End: 2020-11-03
Payer: MEDICARE

## 2020-11-03 DIAGNOSIS — Z23 NEED FOR INFLUENZA VACCINATION: Primary | ICD-10-CM

## 2020-11-03 PROCEDURE — G0008 ADMIN INFLUENZA VIRUS VAC: HCPCS | Mod: S$GLB,,, | Performed by: FAMILY MEDICINE

## 2020-11-03 PROCEDURE — 90694 VACC AIIV4 NO PRSRV 0.5ML IM: CPT | Mod: S$GLB,,, | Performed by: FAMILY MEDICINE

## 2020-11-03 PROCEDURE — G0008 FLU VACCINE - QUADRIVALENT - ADJUVANTED: ICD-10-PCS | Mod: S$GLB,,, | Performed by: FAMILY MEDICINE

## 2020-11-03 PROCEDURE — 90694 FLU VACCINE - QUADRIVALENT - ADJUVANTED: ICD-10-PCS | Mod: S$GLB,,, | Performed by: FAMILY MEDICINE

## 2020-11-15 ENCOUNTER — TELEPHONE (OUTPATIENT)
Dept: FAMILY MEDICINE | Facility: CLINIC | Age: 75
End: 2020-11-15

## 2020-11-15 NOTE — TELEPHONE ENCOUNTER
----- Message from Rosie Mendoza sent at 11/13/2020  4:46 PM CST -----  Regarding: mammo  Contact: fax  Dr. Gibson    Please review document  scanned into patient's media.       RE: [facility]

## 2020-11-16 NOTE — TELEPHONE ENCOUNTER
Reminder: Contact pt and inform her that you spoke with radiologist (Dr. Sanchez) regarding her mammo additional views and that she's in need of a biopsy.

## 2020-11-17 ENCOUNTER — TELEPHONE (OUTPATIENT)
Dept: FAMILY MEDICINE | Facility: CLINIC | Age: 75
End: 2020-11-17

## 2020-11-17 ENCOUNTER — PATIENT MESSAGE (OUTPATIENT)
Dept: FAMILY MEDICINE | Facility: CLINIC | Age: 75
End: 2020-11-17

## 2020-11-17 DIAGNOSIS — R92.8 ABNORMAL MAMMOGRAM OF LEFT BREAST: Primary | ICD-10-CM

## 2020-11-17 NOTE — TELEPHONE ENCOUNTER
I called pt and if needs a needle bx with radiologist, she wants to go to [facility]; referral placed; call  radiology department to help arrange this; I told her to  films on CD to bring with her

## 2020-11-17 NOTE — TELEPHONE ENCOUNTER
Left message for pt to call back- need to find out if she has a preference for facility to go to for biopsy.

## 2020-11-17 NOTE — TELEPHONE ENCOUNTER
Pt stated that she'd like to speak with you about needing a breast biopsy. She stated that she would prefer someone at either [facility] or [facility].

## 2020-11-18 ENCOUNTER — PATIENT OUTREACH (OUTPATIENT)
Dept: FAMILY MEDICINE | Facility: CLINIC | Age: 75
End: 2020-11-18

## 2020-11-18 NOTE — TELEPHONE ENCOUNTER
I spoke with Ms. Pablo. She will speak with her nephew, who's a radiologist at [facility], to get the name of someone in [place]. Ms. Pablo will call back tomorrow with the name.

## 2020-11-19 ENCOUNTER — TELEPHONE (OUTPATIENT)
Dept: FAMILY MEDICINE | Facility: CLINIC | Age: 75
End: 2020-11-19

## 2020-11-19 NOTE — TELEPHONE ENCOUNTER
Ms. Pablo called back today to inform you that she would like to have her needle biopsy done at [facility] by Dr. Vianey Schrader. I have updated the needle biopsy order to reflect Dr. Vianey Schrader, but I am in need of you signing this order.     Dr. Vianey Schrader    [facility] ([place])    Office/: [phone]  Fax: [phone]

## 2020-11-19 NOTE — TELEPHONE ENCOUNTER
----- Message from Lulu Lynn sent at 11/19/2020  9:20 AM CST -----  Regarding: referral  Contact: [phone]/self  Type:  Patient Requesting Referral    Who Called:   Does the patient already have the specialty appointment scheduled?: no   If yes, what is the date of that appointment?: no  Referral to What Specialty:   Reason for Referral: biopsy  Does the patient want the referral with a specific physician?: Dr. [facility] in   [address] [place] [address]  Office/: [phone]  Fax: [phone]  Is the specialist an [facility]?: not/   Patient Requesting a Response?: yes  Would the patient rather a call back or a response via [facility]?  call  Best Call Back Number: [phone]/self  Additional Information:

## 2020-11-19 NOTE — TELEPHONE ENCOUNTER
Spoke with pt and informed her that we will fax needle biopsy referral to Dr. Vianey Schrader's office at [facility]. I am currently awaiting updated order to be signed by provider.

## 2020-11-20 NOTE — TELEPHONE ENCOUNTER
I've faxed order to [facility] for scheduling. They will contact pt to facilitate an appt.

## 2020-12-04 ENCOUNTER — TELEPHONE (OUTPATIENT)
Dept: FAMILY MEDICINE | Facility: CLINIC | Age: 75
End: 2020-12-04

## 2020-12-04 NOTE — TELEPHONE ENCOUNTER
----- Message from Frances Silverman sent at 12/4/2020  1:07 PM CST -----      Contact: Jessica with [facility]  Phone: [phone]  Fax [phone]  Reason: Need screening mammogram records faxed over

## 2020-12-11 ENCOUNTER — PATIENT MESSAGE (OUTPATIENT)
Dept: OTHER | Facility: OTHER | Age: 75
End: 2020-12-11

## 2020-12-28 ENCOUNTER — OFFICE VISIT (OUTPATIENT)
Dept: FAMILY MEDICINE | Facility: CLINIC | Age: 75
End: 2020-12-28
Payer: MEDICARE

## 2020-12-28 VITALS
BODY MASS INDEX: 28.34 KG/M2 | TEMPERATURE: 98 F | WEIGHT: 166 LBS | HEART RATE: 91 BPM | DIASTOLIC BLOOD PRESSURE: 68 MMHG | HEIGHT: 64 IN | SYSTOLIC BLOOD PRESSURE: 132 MMHG | OXYGEN SATURATION: 98 %

## 2020-12-28 DIAGNOSIS — G89.29 CHRONIC PAIN OF BOTH SHOULDERS: ICD-10-CM

## 2020-12-28 DIAGNOSIS — M54.2 NECK PAIN: ICD-10-CM

## 2020-12-28 DIAGNOSIS — Z00.00 WELLNESS EXAMINATION: Primary | ICD-10-CM

## 2020-12-28 DIAGNOSIS — M19.90 ARTHRITIS: ICD-10-CM

## 2020-12-28 DIAGNOSIS — M25.512 CHRONIC PAIN OF BOTH SHOULDERS: ICD-10-CM

## 2020-12-28 DIAGNOSIS — M25.511 CHRONIC PAIN OF BOTH SHOULDERS: ICD-10-CM

## 2020-12-28 DIAGNOSIS — H65.02 NON-RECURRENT ACUTE SEROUS OTITIS MEDIA OF LEFT EAR: ICD-10-CM

## 2020-12-28 PROCEDURE — 99214 OFFICE O/P EST MOD 30 MIN: CPT | Mod: S$GLB,,, | Performed by: FAMILY MEDICINE

## 2020-12-28 PROCEDURE — 99214 PR OFFICE/OUTPT VISIT, EST, LEVL IV, 30-39 MIN: ICD-10-PCS | Mod: S$GLB,,, | Performed by: FAMILY MEDICINE

## 2021-01-10 ENCOUNTER — IMMUNIZATION (OUTPATIENT)
Dept: INTERNAL MEDICINE | Facility: CLINIC | Age: 76
End: 2021-01-10
Payer: MEDICARE

## 2021-01-10 DIAGNOSIS — Z23 NEED FOR VACCINATION: ICD-10-CM

## 2021-01-10 PROCEDURE — 91300 COVID-19, MRNA, LNP-S, PF, 30 MCG/0.3 ML DOSE VACCINE: CPT | Mod: PBBFAC,PO

## 2021-01-31 ENCOUNTER — IMMUNIZATION (OUTPATIENT)
Dept: INTERNAL MEDICINE | Facility: CLINIC | Age: 76
End: 2021-01-31
Payer: MEDICARE

## 2021-01-31 DIAGNOSIS — Z23 NEED FOR VACCINATION: Primary | ICD-10-CM

## 2021-01-31 PROCEDURE — 91300 COVID-19, MRNA, LNP-S, PF, 30 MCG/0.3 ML DOSE VACCINE: CPT | Mod: PBBFAC | Performed by: FAMILY MEDICINE

## 2021-01-31 PROCEDURE — 0002A COVID-19, MRNA, LNP-S, PF, 30 MCG/0.3 ML DOSE VACCINE: CPT | Mod: PBBFAC | Performed by: FAMILY MEDICINE

## 2021-06-08 ENCOUNTER — TELEPHONE (OUTPATIENT)
Dept: FAMILY MEDICINE | Facility: CLINIC | Age: 76
End: 2021-06-08

## 2021-06-08 DIAGNOSIS — Z12.31 ENCOUNTER FOR SCREENING MAMMOGRAM FOR MALIGNANT NEOPLASM OF BREAST: Primary | ICD-10-CM

## 2021-06-09 ENCOUNTER — TELEPHONE (OUTPATIENT)
Dept: FAMILY MEDICINE | Facility: CLINIC | Age: 76
End: 2021-06-09

## 2021-06-09 DIAGNOSIS — R92.8 ABNORMAL MAMMOGRAM: ICD-10-CM

## 2021-06-09 DIAGNOSIS — R92.8 ABNORMAL MAMMOGRAM OF LEFT BREAST: Primary | ICD-10-CM

## 2021-06-15 ENCOUNTER — PATIENT OUTREACH (OUTPATIENT)
Dept: ADMINISTRATIVE | Facility: HOSPITAL | Age: 76
End: 2021-06-15

## 2021-11-05 ENCOUNTER — TELEPHONE (OUTPATIENT)
Dept: FAMILY MEDICINE | Facility: CLINIC | Age: 76
End: 2021-11-05
Payer: MEDICARE

## 2021-11-05 DIAGNOSIS — M19.90 ARTHRITIS: ICD-10-CM

## 2021-11-05 DIAGNOSIS — Z82.49 FAMILY HISTORY OF EARLY CAD: ICD-10-CM

## 2021-11-05 DIAGNOSIS — R73.9 HYPERGLYCEMIA: ICD-10-CM

## 2021-11-05 DIAGNOSIS — M81.0 OSTEOPOROSIS OF MULTIPLE SITES: ICD-10-CM

## 2021-11-05 DIAGNOSIS — Z00.00 WELLNESS EXAMINATION: Primary | ICD-10-CM

## 2021-11-05 DIAGNOSIS — I10 ESSENTIAL HYPERTENSION: ICD-10-CM

## 2021-11-22 ENCOUNTER — OFFICE VISIT (OUTPATIENT)
Dept: FAMILY MEDICINE | Facility: CLINIC | Age: 76
End: 2021-11-22
Payer: MEDICARE

## 2021-11-22 VITALS
SYSTOLIC BLOOD PRESSURE: 120 MMHG | BODY MASS INDEX: 27.13 KG/M2 | OXYGEN SATURATION: 100 % | DIASTOLIC BLOOD PRESSURE: 68 MMHG | HEIGHT: 64 IN | WEIGHT: 158.94 LBS | TEMPERATURE: 98 F | HEART RATE: 82 BPM

## 2021-11-22 DIAGNOSIS — I10 PRIMARY HYPERTENSION: ICD-10-CM

## 2021-11-22 DIAGNOSIS — I10 ESSENTIAL HYPERTENSION: ICD-10-CM

## 2021-11-22 DIAGNOSIS — R73.9 HYPERGLYCEMIA: ICD-10-CM

## 2021-11-22 DIAGNOSIS — Z00.00 WELLNESS EXAMINATION: Primary | ICD-10-CM

## 2021-11-22 DIAGNOSIS — M54.2 NECK PAIN: ICD-10-CM

## 2021-11-22 DIAGNOSIS — Z82.49 FAMILY HISTORY OF EARLY CAD: ICD-10-CM

## 2021-11-22 DIAGNOSIS — M81.0 OSTEOPOROSIS OF MULTIPLE SITES: ICD-10-CM

## 2021-11-22 DIAGNOSIS — M19.90 ARTHRITIS: ICD-10-CM

## 2021-11-22 PROCEDURE — 99214 PR OFFICE/OUTPT VISIT, EST, LEVL IV, 30-39 MIN: ICD-10-PCS | Mod: S$GLB,,, | Performed by: FAMILY MEDICINE

## 2021-11-22 PROCEDURE — 99214 OFFICE O/P EST MOD 30 MIN: CPT | Mod: S$GLB,,, | Performed by: FAMILY MEDICINE

## 2021-11-22 RX ORDER — FLUTICASONE PROPIONATE 50 MCG
SPRAY, SUSPENSION (ML) NASAL
COMMUNITY
End: 2022-11-27 | Stop reason: ALTCHOICE

## 2021-11-22 RX ORDER — DENOSUMAB 60 MG/ML
60 INJECTION SUBCUTANEOUS
COMMUNITY

## 2021-11-22 RX ORDER — DEXAMETHASONE SODIUM PHOSPHATE 4 MG/ML
2 INJECTION, SOLUTION INTRA-ARTICULAR; INTRALESIONAL; INTRAMUSCULAR; INTRAVENOUS; SOFT TISSUE
COMMUNITY
End: 2022-11-17 | Stop reason: ALTCHOICE

## 2021-11-22 NOTE — PROGRESS NOTES
"Subjective:      Patient ID: Emilia Pablo is a 76 y.o. female.    Chief Complaint: Annual Exam      Vitals:    11/22/21 1048   BP: 120/68   Pulse: 82   Temp: 98.1 °F (36.7 °C)   SpO2: 100%   Weight: 72.1 kg (158 lb 15.2 oz)   Height: 5' 4" (1.626 m)        HPI   Wellness; labs at [facility], no change from a year ago  Has osteoarthritis  Hx of c spine fusion  On gabapentin for posrt herpetic neuralgia  C/o left post headaches  Neck hurts, trouble turning her neck to left  Went to [facility]      Problem List  Patient Active Problem List   Diagnosis    Osteoporosis of multiple sites    Neck pain    Hypertension    Primary insomnia    Chronic mixed headache syndrome    Chronic pain of both shoulders    Arthritis        ALLERGIES: Review of patient's allergies indicates:  No Known Allergies    MEDS:   Current Outpatient Medications:     acetaminophen (TYLENOL) 500 MG tablet, Take 500 mg by mouth., Disp: , Rfl:     b complex vitamins capsule, Take 1 capsule by mouth once daily., Disp: , Rfl:     celecoxib (CELEBREX) 200 MG capsule, TAKE 1 CAPSULE BY MOUTH ONCE DAILY., Disp: 90 capsule, Rfl: 3    cyclobenzaprine (FLEXERIL) 5 MG tablet, take 1 BY MOUTH TWICE DAILY AS NEEDED FOR MUSCLE SPASMS, Disp: 60 tablet, Rfl: 5    denosumab (PROLIA) 60 mg/mL Syrg, Prolia  EVERY 6MONTHS, Disp: , Rfl:     dexamethasone (DECADRON) 4 mg/mL injection, 2 mLs., Disp: , Rfl:     fish oil-omega-3 fatty acids 300-1,000 mg capsule, Take 2 g by mouth once daily., Disp: , Rfl:     fluticasone propionate (FLONASE) 50 mcg/actuation nasal spray, fluticasone propionate 50 mcg/actuation nasal spray,suspension  USE 1 SPRAY(S) IN EACH NOSTRIL TWICE DAILY, Disp: , Rfl:     gabapentin (NEURONTIN) 300 MG capsule, TAKE 1 CAPSULE EVERY EVENING FOR SHINGLES PAIN, Disp: 90 capsule, Rfl: 3    glucosamine-chondroitin 500-400 mg tablet, Take 1 tablet by mouth 2 (two) times daily., Disp: , Rfl:     olmesartan (BENICAR) 5 MG Tab, TAKE 1 TABLET BY " MOUTH ONCE DAILY., Disp: 90 tablet, Rfl: 3    pantoprazole (PROTONIX) 40 MG tablet, Take 40 mg by mouth once daily., Disp: , Rfl:     vitamin D 1000 units Tab, Take 2,000 Units by mouth once daily., Disp: , Rfl:     denosumab (PROLIA SUBQ), Inject into the skin., Disp: , Rfl:       History:  Current Providers as of 2021  PCP: Dave Gibson MD  Care Team Provider: Ayush Juan MD  Care Team Provider: Marquis Aviles MD  Care Team Provider: Daniel Wright MD  Care Team Provider: Ozzy Jose MD  Care Team Provider: Lionel Gandhi MD  Care Team Provider: Vianey Schrader MD  Care Team Provider: Lesa oBwling LPN  Encounter Provider: Dave Gibson MD, starting on  12:00 AM  Referring Provider: not found, starting on  12:00 AM  Consulting Physician: Dave Gibson MD, starting on  10:43 AM (Active)   Past Medical History:   Diagnosis Date    Arthritis     osteoarthritis    Hypertension     Abreu-Stickler syndrome      Past Surgical History:   Procedure Laterality Date    acdf  2001    2 level    BACK SURGERY      laminectomy    BACK SURGERY      lumbar fusion L1-S1    CARDIAC CATHETERIZATION  2016    Normal Dr Wright; see media note; normal     SECTION      x2    EYE SURGERY Bilateral     laser   -   Nano's     JOINT REPLACEMENT Left 2013    JOINT REPLACEMENT Right 5/11/15    TONSILLECTOMY       Social History     Tobacco Use    Smoking status: Never Smoker    Smokeless tobacco: Never Used   Substance Use Topics    Alcohol use: Yes     Comment: occasional         Review of Systems   Constitutional: Negative.    HENT: Negative.    Respiratory: Negative.    Cardiovascular: Negative.    Gastrointestinal: Negative.    Endocrine: Negative.    Genitourinary: Negative.    Musculoskeletal: Negative.    Psychiatric/Behavioral: Negative.    All other systems reviewed and are negative.    Objective:      Physical Exam  Vitals and nursing note reviewed.   Constitutional:       Appearance: She is well-developed and well-nourished.   HENT:      Head: Normocephalic.   Eyes:      Extraocular Movements: EOM normal.      Conjunctiva/sclera: Conjunctivae normal.      Pupils: Pupils are equal, round, and reactive to light.   Cardiovascular:      Rate and Rhythm: Normal rate and regular rhythm.      Heart sounds: Normal heart sounds.   Pulmonary:      Effort: Pulmonary effort is normal.      Breath sounds: Normal breath sounds.   Musculoskeletal:         General: Normal range of motion.      Cervical back: Normal range of motion and neck supple.   Skin:     General: Skin is warm and dry.   Neurological:      Mental Status: She is alert and oriented to person, place, and time.      Deep Tendon Reflexes: Reflexes are normal and symmetric.   Psychiatric:         Mood and Affect: Mood and affect normal.         Behavior: Behavior normal.         Thought Content: Thought content normal.         Judgment: Judgment normal.             Assessment:     1. Wellness examination    2. Essential hypertension    3. Hyperglycemia    4. Family history of early CAD    5. Arthritis    6. Neck pain    7. Osteoporosis of multiple sites    8. Primary hypertension      Plan:       Wellness examination    Essential hypertension    Hyperglycemia    Family history of early CAD    Arthritis    Neck pain    Osteoporosis of multiple sites    Primary hypertension

## 2022-03-15 ENCOUNTER — PES CALL (OUTPATIENT)
Dept: ADMINISTRATIVE | Facility: CLINIC | Age: 77
End: 2022-03-15
Payer: MEDICARE

## 2022-06-17 ENCOUNTER — TELEPHONE (OUTPATIENT)
Dept: FAMILY MEDICINE | Facility: CLINIC | Age: 77
End: 2022-06-17
Payer: MEDICARE

## 2022-06-17 DIAGNOSIS — Z12.31 ENCOUNTER FOR SCREENING MAMMOGRAM FOR MALIGNANT NEOPLASM OF BREAST: Primary | ICD-10-CM

## 2022-06-17 NOTE — TELEPHONE ENCOUNTER
Pt is requesting to have mammogram done at [facility]. Please place order. Thanks

## 2022-06-17 NOTE — TELEPHONE ENCOUNTER
----- Message from Delma Montelongo sent at 6/17/2022  8:50 AM CDT -----  Contact: [phone]/ Self  Type:  Mammogram    Caller is requesting to schedule their annual mammogram appointment.  Order is not listed in EPIC.  Please enter order and contact patient to schedule.  Name of Caller:Pt   Where would they like the mammogram performed?[facility]   Would the patient rather a call back or a response via [facility]? Call back   Best Call Back Number:[phone]  Additional Information: n/a

## 2022-06-21 ENCOUNTER — TELEPHONE (OUTPATIENT)
Dept: FAMILY MEDICINE | Facility: CLINIC | Age: 77
End: 2022-06-21
Payer: MEDICARE

## 2022-06-21 NOTE — TELEPHONE ENCOUNTER
----- Message from Brianda Talbot sent at 6/21/2022  1:14 PM CDT -----  Contact: [phone]/self  Who Called: PT  Regarding: Mammo needs to be sent to [facility] to schedule [phone] ( Fax )   Would the patient rather a call back or a response via [facility]? Call back  Best Call Back Number:[phone]   Additional Information: n/a

## 2022-08-11 ENCOUNTER — PES CALL (OUTPATIENT)
Dept: ADMINISTRATIVE | Facility: OTHER | Age: 77
End: 2022-08-11
Payer: MEDICARE

## 2022-08-19 PROBLEM — M81.0 AGE-RELATED OSTEOPOROSIS WITHOUT CURRENT PATHOLOGICAL FRACTURE: Status: ACTIVE | Noted: 2018-07-16

## 2022-08-24 RX ORDER — GABAPENTIN 300 MG/1
CAPSULE ORAL
Qty: 90 CAPSULE | Refills: 3 | Status: SHIPPED | OUTPATIENT
Start: 2022-08-24 | End: 2022-12-19

## 2022-08-31 DIAGNOSIS — Z78.0 MENOPAUSE: ICD-10-CM

## 2022-09-06 ENCOUNTER — PES CALL (OUTPATIENT)
Dept: ADMINISTRATIVE | Facility: CLINIC | Age: 77
End: 2022-09-06
Payer: MEDICARE

## 2022-10-27 NOTE — TELEPHONE ENCOUNTER
----- Message from Citlalli Staton sent at 10/27/2022  3:35 PM CDT -----  Type:  RX Refill Request    Who Called: Pt   Refill or New Rx:rx   RX Name and Strength:cyclobenzaprine (FLEXERIL) 5 MG tablet  How is the patient currently taking it? (ex. 1XDay): 2 X day as needed   Is this a 30 day or 90 day RX:30   Preferred Pharmacy with phone number:[facility] [address] [place] [address] [facility]   Phone: [phone]  Fax:  [phone]  Would the patient rather a call back or a response via [facility]? Call back   Best Call Back Number:[phone]  Additional Information:

## 2022-10-27 NOTE — TELEPHONE ENCOUNTER
Care Due:                  Date            Visit Type   Department     Provider  --------------------------------------------------------------------------------                                EP -                              PRIMARY      [facility] FAMILY  Last Visit: 11-      CARE ([facility])   MEDICINE       Dave Gibson  Next Visit: None Scheduled  None         None Found                                                            Last  Test          Frequency    Reason                     Performed    Due Date  --------------------------------------------------------------------------------    Office Visit  12 months..  olmesartan...............  11- 11-    CMP.........  12 months..  olmesartan...............  Not Found    Overdue    [employer] Embedded Care Gaps. Reference number: [id number]. 10/27/2022   3:40:28 PM CDT

## 2022-10-28 RX ORDER — CYCLOBENZAPRINE HCL 5 MG
TABLET ORAL
Qty: 60 TABLET | Refills: 5 | Status: SHIPPED | OUTPATIENT
Start: 2022-10-28 | End: 2023-08-21

## 2022-11-16 ENCOUNTER — TELEPHONE (OUTPATIENT)
Dept: FAMILY MEDICINE | Facility: CLINIC | Age: 77
End: 2022-11-16
Payer: MEDICARE

## 2022-11-16 NOTE — TELEPHONE ENCOUNTER
Spoke with pt. Appt has been scheduled for evaluation of back pain that radiates into her lower abdominal region.

## 2022-11-16 NOTE — TELEPHONE ENCOUNTER
----- Message from Citlalli Staton sent at 11/16/2022 10:26 AM CST -----  Type:  Patient Returning Call    Who Called:Pt   Would the patient rather a call back or a response via [facility]? Call back   Best Call Back Number:[phone]  Additional Information: pt is having issues with back pain and abdominal pain ... labs

## 2022-11-17 ENCOUNTER — TELEPHONE (OUTPATIENT)
Dept: FAMILY MEDICINE | Facility: CLINIC | Age: 77
End: 2022-11-17

## 2022-11-17 ENCOUNTER — OFFICE VISIT (OUTPATIENT)
Dept: FAMILY MEDICINE | Facility: CLINIC | Age: 77
End: 2022-11-17
Payer: MEDICARE

## 2022-11-17 VITALS
DIASTOLIC BLOOD PRESSURE: 76 MMHG | HEIGHT: 64 IN | TEMPERATURE: 98 F | HEART RATE: 90 BPM | SYSTOLIC BLOOD PRESSURE: 128 MMHG | WEIGHT: 155.13 LBS | BODY MASS INDEX: 26.49 KG/M2 | OXYGEN SATURATION: 98 %

## 2022-11-17 DIAGNOSIS — R73.9 HYPERGLYCEMIA: ICD-10-CM

## 2022-11-17 DIAGNOSIS — M19.90 ARTHRITIS: Primary | ICD-10-CM

## 2022-11-17 DIAGNOSIS — M81.0 AGE-RELATED OSTEOPOROSIS WITHOUT CURRENT PATHOLOGICAL FRACTURE: ICD-10-CM

## 2022-11-17 DIAGNOSIS — G89.29 CHRONIC PAIN OF BOTH SHOULDERS: ICD-10-CM

## 2022-11-17 DIAGNOSIS — Z00.00 WELLNESS EXAMINATION: ICD-10-CM

## 2022-11-17 DIAGNOSIS — Z82.49 FAMILY HISTORY OF EARLY CAD: ICD-10-CM

## 2022-11-17 DIAGNOSIS — M54.2 NECK PAIN: ICD-10-CM

## 2022-11-17 DIAGNOSIS — M25.512 CHRONIC PAIN OF BOTH SHOULDERS: ICD-10-CM

## 2022-11-17 DIAGNOSIS — M81.0 OSTEOPOROSIS OF MULTIPLE SITES: ICD-10-CM

## 2022-11-17 DIAGNOSIS — F51.01 PRIMARY INSOMNIA: ICD-10-CM

## 2022-11-17 DIAGNOSIS — I10 ESSENTIAL HYPERTENSION: ICD-10-CM

## 2022-11-17 DIAGNOSIS — M15.9 PRIMARY OSTEOARTHRITIS INVOLVING MULTIPLE JOINTS: ICD-10-CM

## 2022-11-17 DIAGNOSIS — M25.511 CHRONIC PAIN OF BOTH SHOULDERS: ICD-10-CM

## 2022-11-17 DIAGNOSIS — R10.9 ABDOMINAL PAIN, UNSPECIFIED ABDOMINAL LOCATION: ICD-10-CM

## 2022-11-17 DIAGNOSIS — Q89.8: ICD-10-CM

## 2022-11-17 DIAGNOSIS — I10 PRIMARY HYPERTENSION: ICD-10-CM

## 2022-11-17 PROCEDURE — 99214 PR OFFICE/OUTPT VISIT, EST, LEVL IV, 30-39 MIN: ICD-10-PCS | Mod: S$GLB,,, | Performed by: FAMILY MEDICINE

## 2022-11-17 PROCEDURE — 99214 OFFICE O/P EST MOD 30 MIN: CPT | Mod: S$GLB,,, | Performed by: FAMILY MEDICINE

## 2022-11-17 RX ORDER — IBUPROFEN 800 MG/1
800 TABLET ORAL DAILY PRN
Qty: 30 TABLET | Refills: 2 | Status: SHIPPED | OUTPATIENT
Start: 2022-11-17 | End: 2023-08-21

## 2022-11-17 NOTE — TELEPHONE ENCOUNTER
Order for CT Abd/Pelvis without contrast has been faxed to [facility] for scheduling. Per precert dept: No precert required for test

## 2022-11-19 ENCOUNTER — PATIENT MESSAGE (OUTPATIENT)
Dept: FAMILY MEDICINE | Facility: CLINIC | Age: 77
End: 2022-11-19
Payer: MEDICARE

## 2022-12-08 LAB
CHOLEST SERPL-MSCNC: 244 MG/DL (ref 0–200)
HDLC SERPL-MCNC: 105 MG/DL (ref 35–70)
LDLC SERPL CALC-MCNC: 127 MG/DL (ref 0–160)
TRIGL SERPL-MCNC: 61 MG/DL (ref 40–160)

## 2022-12-09 ENCOUNTER — PATIENT OUTREACH (OUTPATIENT)
Dept: ADMINISTRATIVE | Facility: HOSPITAL | Age: 77
End: 2022-12-09
Payer: MEDICARE

## 2022-12-10 ENCOUNTER — PATIENT MESSAGE (OUTPATIENT)
Dept: FAMILY MEDICINE | Facility: CLINIC | Age: 77
End: 2022-12-10
Payer: MEDICARE

## 2022-12-15 ENCOUNTER — TELEPHONE (OUTPATIENT)
Dept: FAMILY MEDICINE | Facility: CLINIC | Age: 77
End: 2022-12-15
Payer: MEDICARE

## 2022-12-15 ENCOUNTER — PATIENT MESSAGE (OUTPATIENT)
Dept: FAMILY MEDICINE | Facility: CLINIC | Age: 77
End: 2022-12-15
Payer: MEDICARE

## 2022-12-15 DIAGNOSIS — E87.1 HYPONATREMIA: Primary | ICD-10-CM

## 2022-12-19 ENCOUNTER — OFFICE VISIT (OUTPATIENT)
Dept: FAMILY MEDICINE | Facility: CLINIC | Age: 77
End: 2022-12-19
Payer: MEDICARE

## 2022-12-19 VITALS
SYSTOLIC BLOOD PRESSURE: 132 MMHG | DIASTOLIC BLOOD PRESSURE: 70 MMHG | BODY MASS INDEX: 25.9 KG/M2 | WEIGHT: 151.69 LBS | HEART RATE: 65 BPM | OXYGEN SATURATION: 96 % | TEMPERATURE: 98 F | HEIGHT: 64 IN

## 2022-12-19 DIAGNOSIS — Z00.00 WELLNESS EXAMINATION: Primary | ICD-10-CM

## 2022-12-19 PROCEDURE — 99214 OFFICE O/P EST MOD 30 MIN: CPT | Mod: S$GLB,,, | Performed by: FAMILY MEDICINE

## 2022-12-19 PROCEDURE — 99214 PR OFFICE/OUTPT VISIT, EST, LEVL IV, 30-39 MIN: ICD-10-PCS | Mod: S$GLB,,, | Performed by: FAMILY MEDICINE

## 2022-12-19 RX ORDER — GABAPENTIN 300 MG/1
CAPSULE ORAL
Qty: 270 CAPSULE | Refills: 3 | Status: SHIPPED | OUTPATIENT
Start: 2022-12-19 | End: 2023-12-20

## 2022-12-19 NOTE — PROGRESS NOTES
"Subjective:      Patient ID: Emilia Pablo is a 77 y.o. female.    Chief Complaint: Annual Exam      Vitals:    12/19/22 1015   BP: 132/70   Pulse: 65   Temp: 98.4 °F (36.9 °C)   TempSrc: Oral   SpO2: 96%   Weight: 68.8 kg (151 lb 10.8 oz)   Height: 5' 4" (1.626 m)        HPI   Annual  Had flank pain, right lower side/flank pain  Will increase gabapentin to 2 hs and one in AM as needed  Had labs  Had 2 normal angiograms, thus lipid not needed    Problem List  Patient Active Problem List   Diagnosis    Neck pain    Hypertension    Primary insomnia    Chronic mixed headache syndrome    Chronic pain of both shoulders    Arthritis    Age-related osteoporosis without current pathological fracture    Abreu-Stickler syndrome        ALLERGIES: Review of patient's allergies indicates:  No Known Allergies    MEDS:   Current Outpatient Medications:     acetaminophen (TYLENOL) 650 MG TbSR, Take 650 mg by mouth., Disp: , Rfl:     b complex vitamins capsule, Take 1 capsule by mouth once daily., Disp: , Rfl:     celecoxib (CELEBREX) 200 MG capsule, TAKE 1 CAPSULE EVERY DAY, Disp: 90 capsule, Rfl: 3    cyclobenzaprine (FLEXERIL) 5 MG tablet, take 1 BY MOUTH TWICE DAILY AS NEEDED FOR MUSCLE SPASMS, Disp: 60 tablet, Rfl: 5    denosumab (PROLIA) 60 mg/mL Syrg, Prolia  EVERY 6MONTHS, Disp: , Rfl:     fish oil-omega-3 fatty acids 300-1,000 mg capsule, Take 2 g by mouth once daily., Disp: , Rfl:     glucosamine-chondroitin 500-400 mg tablet, Take 1 tablet by mouth 2 (two) times daily., Disp: , Rfl:     ibuprofen (ADVIL,MOTRIN) 800 MG tablet, Take 1 tablet (800 mg total) by mouth daily as needed for Pain., Disp: 30 tablet, Rfl: 2    olmesartan (BENICAR) 5 MG Tab, TAKE 1 TABLET EVERY DAY, Disp: 90 tablet, Rfl: 3    vitamin D 1000 units Tab, Take 2,000 Units by mouth once daily., Disp: , Rfl:     gabapentin (NEURONTIN) 300 MG capsule, 2 nightly and one during day as needed for nerve pain, Disp: 270 capsule, Rfl: 3      History:  Current " Providers as of 2022  PCP: Dave Gibson MD  Care Team Provider: Ayush Juan MD  Care Team Provider: Marquis Aviles MD  Care Team Provider: Daniel Wright MD  Care Team Provider: Ozzy Jose MD  Care Team Provider: Lionel Gandhi MD  Care Team Provider: Vianey Schrader MD  Care Team Provider: Lesa Bowling LPN  Encounter Provider: Dave Gibson MD, starting on Mon Dec 19, 2022 12:00 AM  Referring Provider: not found, starting on Mon Dec 19, 2022 12:00 AM  Consulting Physician: Dave Gibson MD, starting on Mon Dec 19, 2022 10:12 AM (Active)   Past Medical History:   Diagnosis Date    Arthritis     osteoarthritis    Hypertension     Abreu-Stickler syndrome      Past Surgical History:   Procedure Laterality Date    acdf      2 level    BACK SURGERY      laminectomy    BACK SURGERY      lumbar fusion L1-S1    CARDIAC CATHETERIZATION  2016    Normal Dr Wirght; see media note; normal     SECTION      x2    EYE SURGERY Bilateral     laser   -   Nano's     JOINT REPLACEMENT Left 2013    JOINT REPLACEMENT Right 5/11/15    TONSILLECTOMY       Social History     Tobacco Use    Smoking status: Never    Smokeless tobacco: Never   Substance Use Topics    Alcohol use: Yes     Comment: occasional         Review of Systems   Constitutional: Negative.    HENT: Negative.     Respiratory: Negative.     Cardiovascular: Negative.    Gastrointestinal: Negative.    Endocrine: Negative.    Genitourinary: Negative.    Musculoskeletal:  Positive for arthralgias and back pain.   Psychiatric/Behavioral: Negative.     All other systems reviewed and are negative.  Objective:     Physical Exam  Vitals and nursing note reviewed.   Constitutional:       Appearance: She is well-developed.   HENT:      Head: Normocephalic.   Eyes:      Conjunctiva/sclera: Conjunctivae normal.      Pupils: Pupils are equal, round, and reactive to light.   Cardiovascular:      Rate and Rhythm:  Normal rate and regular rhythm.      Heart sounds: Normal heart sounds.   Pulmonary:      Effort: Pulmonary effort is normal.      Breath sounds: Normal breath sounds.   Musculoskeletal:         General: Normal range of motion.      Cervical back: Normal range of motion and neck supple.   Skin:     General: Skin is warm and dry.   Neurological:      Mental Status: She is alert and oriented to person, place, and time.      Deep Tendon Reflexes: Reflexes are normal and symmetric.   Psychiatric:         Behavior: Behavior normal.         Thought Content: Thought content normal.         Judgment: Judgment normal.           Assessment:     1. Wellness examination      Plan:        Medication List            Accurate as of December 19, 2022 11:07 AM. If you have any questions, ask your nurse or doctor.                CHANGE how you take these medications      gabapentin 300 MG capsule  Commonly known as: NEURONTIN  2 nightly and one during day as needed for nerve pain  What changed: See the new instructions.  Changed by: Dave Gibson MD            CONTINUE taking these medications      acetaminophen 650 MG Tbsr  Commonly known as: TYLENOL     b complex vitamins capsule     celecoxib 200 MG capsule  Commonly known as: CeleBREX  TAKE 1 CAPSULE EVERY DAY     cyclobenzaprine 5 MG tablet  Commonly known as: FLEXERIL  take 1 BY MOUTH TWICE DAILY AS NEEDED FOR MUSCLE SPASMS     fish oil-omega-3 fatty acids 300-1,000 mg capsule     glucosamine-chondroitin 500-400 mg tablet     ibuprofen 800 MG tablet  Commonly known as: ADVIL,MOTRIN  Take 1 tablet (800 mg total) by mouth daily as needed for Pain.     olmesartan 5 MG Tab  Commonly known as: BENICAR  TAKE 1 TABLET EVERY DAY     PROLIA 60 mg/mL Syrg  Generic drug: denosumab     vitamin D 1000 units Tab  Commonly known as: VITAMIN D3               Where to Get Your Medications        These medications were sent to [facility] - [facility] [address]  [address], [place] [facility] [address]      Phone: [phone]   gabapentin 300 MG capsule       Wellness examination    Other orders  -     gabapentin (NEURONTIN) 300 MG capsule; 2 nightly and one during day as needed for nerve pain  Dispense: 270 capsule; Refill: 3

## 2023-02-21 DIAGNOSIS — I10 ESSENTIAL HYPERTENSION: ICD-10-CM

## 2023-02-21 NOTE — TELEPHONE ENCOUNTER
Care Due:                  Date            Visit Type   Department     Provider  --------------------------------------------------------------------------------                                EP -                              PRIMARY      [facility] FAMILY  Last Visit: 12-      CARE ([facility])   BEENA Gibson                              EP -                              PRIMARY      [facility] FAMILY  Next Visit: 12-      CARE ([facility])   BEENA Gibson                                                            Last  Test          Frequency    Reason                     Performed    Due Date  --------------------------------------------------------------------------------    CMP.........  12 months..  olmesartan...............  Not Found    Overdue    [employer] Embedded Care Gaps. Reference number: [id number]. 2/21/2023   2:33:26 PM CST

## 2023-02-22 RX ORDER — CELECOXIB 200 MG/1
CAPSULE ORAL
Qty: 90 CAPSULE | Refills: 3 | Status: ON HOLD | OUTPATIENT
Start: 2023-02-22 | End: 2023-08-28 | Stop reason: HOSPADM

## 2023-02-22 RX ORDER — OLMESARTAN MEDOXOMIL 5 MG/1
TABLET ORAL
Qty: 90 TABLET | Refills: 3 | Status: SHIPPED | OUTPATIENT
Start: 2023-02-22

## 2023-02-22 NOTE — TELEPHONE ENCOUNTER
Refill Routing Note   Medication(s) are not appropriate for processing by [facility] for the following reason(s):       Required labs outdated    [facility] action(s):  Defer  Route   Requires labs : Yes         Appointments  past 12m or future 3m with PCP    Date Provider   Last Visit   12/19/2022 Dave Gibson MD   Next Visit   Visit date not found Dave Gibson MD   ED visits in past 90 days: 0        Note composed:12:10 AM 02/22/2023

## 2023-04-04 NOTE — ADDENDUM NOTE
Addended by: WILBERT ANDERSON on: 6/25/2018 11:35 AM     Modules accepted: Orders     Information: Selecting Yes will display possible errors in your note based on the variables you have selected. This validation is only offered as a suggestion for you. PLEASE NOTE THAT THE VALIDATION TEXT WILL BE REMOVED WHEN YOU FINALIZE YOUR NOTE. IF YOU WANT TO FAX A PRELIMINARY NOTE YOU WILL NEED TO TOGGLE THIS TO 'NO' IF YOU DO NOT WANT IT IN YOUR FAXED NOTE.

## 2023-04-10 ENCOUNTER — TELEPHONE (OUTPATIENT)
Dept: FAMILY MEDICINE | Facility: CLINIC | Age: 78
End: 2023-04-10
Payer: MEDICARE

## 2023-04-11 ENCOUNTER — PATIENT MESSAGE (OUTPATIENT)
Dept: FAMILY MEDICINE | Facility: CLINIC | Age: 78
End: 2023-04-11
Payer: MEDICARE

## 2023-05-30 ENCOUNTER — OFFICE VISIT (OUTPATIENT)
Dept: FAMILY MEDICINE | Facility: CLINIC | Age: 78
End: 2023-05-30
Payer: MEDICARE

## 2023-05-30 ENCOUNTER — TELEPHONE (OUTPATIENT)
Dept: FAMILY MEDICINE | Facility: CLINIC | Age: 78
End: 2023-05-30
Payer: MEDICARE

## 2023-05-30 VITALS
HEIGHT: 64 IN | DIASTOLIC BLOOD PRESSURE: 64 MMHG | BODY MASS INDEX: 25.85 KG/M2 | OXYGEN SATURATION: 97 % | TEMPERATURE: 98 F | HEART RATE: 88 BPM | SYSTOLIC BLOOD PRESSURE: 138 MMHG | WEIGHT: 151.44 LBS

## 2023-05-30 DIAGNOSIS — M15.9 PRIMARY OSTEOARTHRITIS INVOLVING MULTIPLE JOINTS: ICD-10-CM

## 2023-05-30 DIAGNOSIS — M54.2 NECK PAIN: ICD-10-CM

## 2023-05-30 DIAGNOSIS — M19.90 ARTHRITIS: ICD-10-CM

## 2023-05-30 DIAGNOSIS — R29.898 LEFT LEG WEAKNESS: Primary | ICD-10-CM

## 2023-05-30 DIAGNOSIS — M21.372 LEFT FOOT DROP: ICD-10-CM

## 2023-05-30 DIAGNOSIS — H69.93 DYSFUNCTION OF BOTH EUSTACHIAN TUBES: ICD-10-CM

## 2023-05-30 DIAGNOSIS — Q89.8: ICD-10-CM

## 2023-05-30 DIAGNOSIS — M54.32 SCIATICA OF LEFT SIDE: ICD-10-CM

## 2023-05-30 DIAGNOSIS — I10 HYPERTENSION, UNSPECIFIED TYPE: ICD-10-CM

## 2023-05-30 PROCEDURE — 96372 THER/PROPH/DIAG INJ SC/IM: CPT | Mod: S$GLB,,, | Performed by: FAMILY MEDICINE

## 2023-05-30 PROCEDURE — 96372 PR INJECTION,THERAP/PROPH/DIAG2ST, IM OR SUBCUT: ICD-10-PCS | Mod: S$GLB,,, | Performed by: FAMILY MEDICINE

## 2023-05-30 PROCEDURE — 99214 PR OFFICE/OUTPT VISIT, EST, LEVL IV, 30-39 MIN: ICD-10-PCS | Mod: 25,S$GLB,, | Performed by: FAMILY MEDICINE

## 2023-05-30 PROCEDURE — 99214 OFFICE O/P EST MOD 30 MIN: CPT | Mod: 25,S$GLB,, | Performed by: FAMILY MEDICINE

## 2023-05-30 RX ORDER — TRIAMCINOLONE ACETONIDE 40 MG/ML
80 INJECTION, SUSPENSION INTRA-ARTICULAR; INTRAMUSCULAR ONCE
Status: COMPLETED | OUTPATIENT
Start: 2023-05-30 | End: 2023-05-30

## 2023-05-30 RX ADMIN — TRIAMCINOLONE ACETONIDE 80 MG: 40 INJECTION, SUSPENSION INTRA-ARTICULAR; INTRAMUSCULAR at 04:05

## 2023-05-30 NOTE — TELEPHONE ENCOUNTER
----- Message from Berhane Choi sent at 5/30/2023 12:05 PM CDT -----  Contact: pt  Type:  Sooner Apoointment Request    Caller is requesting a sooner appointment.  Caller declined first available appointment listed below.  Caller will not accept being placed on the waitlist and is requesting a message be sent to doctor.  Name of Caller:pt  When is the first available appointment? N/a  Symptoms:ear pain  Would the patient rather a call back or a response via [facility]? Call  Best Call Back Number:[phone]  Additional Information:

## 2023-05-30 NOTE — PROGRESS NOTES
"Subjective:      Patient ID: Emilia Pablo is a 78 y.o. female.    Chief Complaint: Otalgia      Vitals:    05/30/23 1459   BP: 138/64   Pulse: 88   Temp: 97.8 °F (36.6 °C)   TempSrc: Oral   SpO2: 97%   Weight: 68.7 kg (151 lb 7.3 oz)   Height: 5' 4" (1.626 m)        HPI   Balance problems lately;long hx of left foot drop; has 2 artificial hips  L sine fusion and c spine fusion  Left hip pain and burning  Here today suing a walker  Left leg is heavy, numb and foot drop    Left ear she is fighting, feels blocked, worsening of hearing  Or plugged up  Left sided post headache and neck   Ache  Is willing to do PT, to [facility] [place]    Problem List  Patient Active Problem List   Diagnosis    Neck pain    Hypertension    Primary insomnia    Chronic mixed headache syndrome    Chronic pain of both shoulders    Arthritis    Age-related osteoporosis without current pathological fracture    Abreu-Stickler syndrome        ALLERGIES: Review of patient's allergies indicates:  No Known Allergies    MEDS:   Current Outpatient Medications:     acetaminophen (TYLENOL) 650 MG TbSR, Take 650 mg by mouth., Disp: , Rfl:     b complex vitamins capsule, Take 1 capsule by mouth once daily., Disp: , Rfl:     celecoxib (CELEBREX) 200 MG capsule, TAKE 1 CAPSULE EVERY DAY, Disp: 90 capsule, Rfl: 3    denosumab (PROLIA) 60 mg/mL Syrg, Prolia  EVERY 6MONTHS, Disp: , Rfl:     fish oil-omega-3 fatty acids 300-1,000 mg capsule, Take 2 g by mouth once daily., Disp: , Rfl:     gabapentin (NEURONTIN) 300 MG capsule, 2 nightly and one during day as needed for nerve pain, Disp: 270 capsule, Rfl: 3    glucosamine-chondroitin 500-400 mg tablet, Take 1 tablet by mouth 2 (two) times daily., Disp: , Rfl:     ibuprofen (ADVIL,MOTRIN) 800 MG tablet, Take 1 tablet (800 mg total) by mouth daily as needed for Pain., Disp: 30 tablet, Rfl: 2    olmesartan (BENICAR) 5 MG Tab, TAKE 1 TABLET EVERY DAY, Disp: 90 tablet, Rfl: 3    vitamin D 1000 units Tab, Take " 2,000 Units by mouth once daily., Disp: , Rfl:     cyclobenzaprine (FLEXERIL) 5 MG tablet, take 1 BY MOUTH TWICE DAILY AS NEEDED FOR MUSCLE SPASMS (Patient not taking: Reported on 2023), Disp: 60 tablet, Rfl: 5    Current Facility-Administered Medications:     triamcinolone acetonide injection 80 mg, 80 mg, Intramuscular, Once, Dave Gibson MD      History:  Current Providers as of 2023  PCP: Dave Gibson MD  Care Team Provider: Ayush Juan MD  Care Team Provider: Marquis Aviles MD  Care Team Provider: Daniel Wright MD  Care Team Provider: Ozzy Jose MD  Care Team Provider: Lionel Gandhi MD  Care Team Provider: Vianey Schrader MD  Care Team Provider: Lesa Bowling LPN  Encounter Provider: Dave Gibson MD, starting on Tue May 30, 2023 12:00 AM  Referring Provider: not found, starting on Tue May 30, 2023 12:00 AM  Consulting Physician: Dave Gibson MD, starting on Tue May 30, 2023  2:53 PM (Active)   Past Medical History:   Diagnosis Date    Arthritis     osteoarthritis    Hypertension     Abreu-Stickler syndrome      Past Surgical History:   Procedure Laterality Date    acdf      2 level    BACK SURGERY      laminectomy    BACK SURGERY      lumbar fusion L1-S1    CARDIAC CATHETERIZATION  2016    Normal Dr Wright; see media note; normal     SECTION      x2    EYE SURGERY Bilateral     laser   -   Nano's     JOINT REPLACEMENT Left 2013    JOINT REPLACEMENT Right 5/11/15    TONSILLECTOMY       Social History     Tobacco Use    Smoking status: Never     Passive exposure: Never    Smokeless tobacco: Never   Substance Use Topics    Alcohol use: Yes     Comment: occasional         Review of Systems   Constitutional: Negative.    HENT: Negative.     Respiratory: Negative.     Cardiovascular: Negative.    Gastrointestinal: Negative.    Endocrine: Negative.    Genitourinary: Negative.    Musculoskeletal:  Positive for arthralgias, back  pain, gait problem, neck pain and neck stiffness.   Neurological:  Positive for weakness and headaches.   Psychiatric/Behavioral: Negative.     All other systems reviewed and are negative.  Objective:     Physical Exam  Vitals and nursing note reviewed.   Constitutional:       Appearance: She is well-developed.   HENT:      Head: Normocephalic.   Eyes:      Conjunctiva/sclera: Conjunctivae normal.      Pupils: Pupils are equal, round, and reactive to light.   Cardiovascular:      Rate and Rhythm: Normal rate and regular rhythm.      Heart sounds: Normal heart sounds.   Pulmonary:      Effort: Pulmonary effort is normal.      Breath sounds: Normal breath sounds.   Musculoskeletal:         General: Normal range of motion.      Cervical back: Normal range of motion and neck supple.   Skin:     General: Skin is warm and dry.   Neurological:      Mental Status: She is alert and oriented to person, place, and time.      Motor: Weakness present.      Gait: Gait abnormal.      Deep Tendon Reflexes: Reflexes are normal and symmetric.   Psychiatric:         Mood and Affect: Mood normal.         Behavior: Behavior normal.         Thought Content: Thought content normal.         Judgment: Judgment normal.           Assessment:     1. Left leg weakness    2. Primary osteoarthritis involving multiple joints    3. Neck pain    4. Sciatica of left side    5. Left foot drop    6. Arthritis    7. Abreu-Stickler syndrome    8. Hypertension, unspecified type    9. Dysfunction of both eustachian tubes      Plan:        Medication List            Accurate as of May 30, 2023  3:39 PM. If you have any questions, ask your nurse or doctor.                CONTINUE taking these medications      acetaminophen 650 MG Tbsr  Commonly known as: TYLENOL     b complex vitamins capsule     celecoxib 200 MG capsule  Commonly known as: CeleBREX  TAKE 1 CAPSULE EVERY DAY     cyclobenzaprine 5 MG tablet  Commonly known as: FLEXERIL  take 1 BY MOUTH TWICE  DAILY AS NEEDED FOR MUSCLE SPASMS     fish oil-omega-3 fatty acids 300-1,000 mg capsule     gabapentin 300 MG capsule  Commonly known as: NEURONTIN  2 nightly and one during day as needed for nerve pain     glucosamine-chondroitin 500-400 mg tablet     ibuprofen 800 MG tablet  Commonly known as: ADVIL,MOTRIN  Take 1 tablet (800 mg total) by mouth daily as needed for Pain.     olmesartan 5 MG Tab  Commonly known as: BENICAR  TAKE 1 TABLET EVERY DAY     PROLIA 60 mg/mL Syrg  Generic drug: denosumab     vitamin D 1000 units Tab  Commonly known as: VITAMIN D3            Left leg weakness  -     Ambulatory referral/consult to Neurosurgery; Future; Expected date: 06/06/2023    Primary osteoarthritis involving multiple joints  -     Ambulatory referral/consult to Physical/Occupational Therapy; Future; Expected date: 06/06/2023    Neck pain  -     Ambulatory referral/consult to Physical/Occupational Therapy; Future; Expected date: 06/06/2023    Sciatica of left side  -     Ambulatory referral/consult to Physical/Occupational Therapy; Future; Expected date: 06/06/2023  -     Ambulatory referral/consult to Neurosurgery; Future; Expected date: 06/06/2023    Left foot drop  -     Ambulatory referral/consult to Neurosurgery; Future; Expected date: 06/06/2023    Arthritis    Abreu-Stickler syndrome    Hypertension, unspecified type    Dysfunction of both eustachian tubes    Other orders  -     triamcinolone acetonide injection 80 mg

## 2023-05-30 NOTE — TELEPHONE ENCOUNTER
Spoke with pt she stated that she having some pain in her groin pt stated she sent text to dr romero cell

## 2023-05-31 ENCOUNTER — TELEPHONE (OUTPATIENT)
Dept: NEUROSURGERY | Facility: CLINIC | Age: 78
End: 2023-05-31
Payer: MEDICARE

## 2023-05-31 NOTE — TELEPHONE ENCOUNTER
Reached out to patient from [facility]. No answer, LVM to cell number for call back.  RD

## 2023-06-01 ENCOUNTER — PATIENT MESSAGE (OUTPATIENT)
Dept: PHYSICAL MEDICINE AND REHAB | Facility: CLINIC | Age: 78
End: 2023-06-01
Payer: MEDICARE

## 2023-06-01 ENCOUNTER — PATIENT OUTREACH (OUTPATIENT)
Dept: ADMINISTRATIVE | Facility: HOSPITAL | Age: 78
End: 2023-06-01
Payer: MEDICARE

## 2023-06-01 ENCOUNTER — TELEPHONE (OUTPATIENT)
Dept: NEUROSURGERY | Facility: CLINIC | Age: 78
End: 2023-06-01
Payer: MEDICARE

## 2023-06-01 NOTE — TELEPHONE ENCOUNTER
LVM for patient to call back [phone] regarding appointment for Back and Spine referral.  Also left message in patient's [employer] account for call back to schedule.  Daniela Gracia RN

## 2023-07-07 ENCOUNTER — TELEPHONE (OUTPATIENT)
Dept: FAMILY MEDICINE | Facility: CLINIC | Age: 78
End: 2023-07-07
Payer: MEDICARE

## 2023-07-07 DIAGNOSIS — M54.32 SCIATICA OF LEFT SIDE: Primary | ICD-10-CM

## 2023-07-07 NOTE — TELEPHONE ENCOUNTER
----- Message from Yina Johnston sent at 7/7/2023  3:18 PM CDT -----  Regarding: x-ray order needed bf pt sees Dr. Randee Gibson,    Please put in x-ray order so I can call pt on Monday 10 th to schedule. Pt can not have MRI.      Sciatica of left side [M54.32]  Left foot drop [M21.372]  Left leg weakness [R29.898]      Thank you,  Yina

## 2023-07-10 ENCOUNTER — HOSPITAL ENCOUNTER (OUTPATIENT)
Dept: RADIOLOGY | Facility: HOSPITAL | Age: 78
Discharge: HOME | End: 2023-07-10
Attending: FAMILY MEDICINE
Payer: MEDICARE

## 2023-07-10 DIAGNOSIS — M54.32 SCIATICA OF LEFT SIDE: ICD-10-CM

## 2023-07-10 PROCEDURE — 72110 XR LUMBAR SPINE COMPLETE 5 VIEW: ICD-10-PCS | Mod: 26,,, | Performed by: RADIOLOGY

## 2023-07-10 PROCEDURE — 72110 X-RAY EXAM L-2 SPINE 4/>VWS: CPT | Mod: TC,FY,PO

## 2023-07-10 PROCEDURE — 72110 X-RAY EXAM L-2 SPINE 4/>VWS: CPT | Mod: 26,,, | Performed by: RADIOLOGY

## 2023-07-20 ENCOUNTER — HOSPITAL ENCOUNTER (OUTPATIENT)
Dept: RADIOLOGY | Facility: HOSPITAL | Age: 78
Discharge: HOME | End: 2023-07-20
Attending: NEUROLOGICAL SURGERY
Payer: MEDICARE

## 2023-07-20 ENCOUNTER — OFFICE VISIT (OUTPATIENT)
Dept: NEUROSURGERY | Facility: CLINIC | Age: 78
End: 2023-07-20
Payer: MEDICARE

## 2023-07-20 VITALS
DIASTOLIC BLOOD PRESSURE: 81 MMHG | WEIGHT: 151.44 LBS | HEART RATE: 88 BPM | HEIGHT: 64 IN | SYSTOLIC BLOOD PRESSURE: 152 MMHG | BODY MASS INDEX: 25.85 KG/M2

## 2023-07-20 DIAGNOSIS — M21.372 LEFT FOOT DROP: ICD-10-CM

## 2023-07-20 DIAGNOSIS — M54.9 DORSALGIA, UNSPECIFIED: ICD-10-CM

## 2023-07-20 DIAGNOSIS — M48.07 SPINAL STENOSIS, LUMBOSACRAL REGION: ICD-10-CM

## 2023-07-20 DIAGNOSIS — R29.898 LEFT LEG WEAKNESS: ICD-10-CM

## 2023-07-20 DIAGNOSIS — Z98.1 S/P LUMBAR FUSION: Primary | ICD-10-CM

## 2023-07-20 DIAGNOSIS — M54.32 SCIATICA OF LEFT SIDE: ICD-10-CM

## 2023-07-20 DIAGNOSIS — Z98.1 S/P LUMBAR FUSION: ICD-10-CM

## 2023-07-20 PROCEDURE — 72082 X-RAY EXAM ENTIRE SPI 2/3 VW: CPT | Mod: TC

## 2023-07-20 PROCEDURE — 99205 PR OFFICE/OUTPT VISIT, NEW, LEVL V, 60-74 MIN: ICD-10-PCS | Mod: S$PBB,,, | Performed by: NEUROLOGICAL SURGERY

## 2023-07-20 PROCEDURE — 72082 X-RAY EXAM ENTIRE SPI 2/3 VW: CPT | Mod: 26,,, | Performed by: RADIOLOGY

## 2023-07-20 PROCEDURE — 72082 XR SCOLIOSIS COMPLETE: ICD-10-PCS | Mod: 26,,, | Performed by: RADIOLOGY

## 2023-07-20 PROCEDURE — 99205 OFFICE O/P NEW HI 60 MIN: CPT | Mod: S$PBB,,, | Performed by: NEUROLOGICAL SURGERY

## 2023-07-20 NOTE — PROGRESS NOTES
[facility]  Neurosurgery    SUBJECTIVE:     History of Present Illness:  This is a very pleasant 78 y.o. female who presents with worsening left leg weakness.  Previous history of spinal fusion.  Was ambulating in May.  Now requires walker for short distances.  No new pain.  Worsening numbness as well.  Previous fusion was 2008.    Symptom onset: current symptoms starting late May  Location: as above  Pain level: 2-10/10  Inciting factors: lying down  Relieving factors: HEP from PT  Numbness: +  Weakness: +  + b/b dysfunction and - saddle anesthesia - continent, but more frquency  - dropping items from hands/changes in handwriting/difficulty buttoning shirts  + gait disturbance   + falls - tripping from shower    Treatments tried:  -PT: +  -LANIE: -  -Gabapentin: 300 TID  -Muscle relaxer: flexeril stopped  -Rx pain medications: none  -Other:  Celebrex  -Spine surgery:   2001 cervical fusion  2008 lumbosacral fusion - EJ - did well from surgery, chronic foot drop on left since surgery.    Blood thinners: fish oil      Review of patient's allergies indicates:  No Known Allergies    No past medical history on file.  Past Surgical History:   Procedure Laterality Date    ANKLE SURGERY Right     COLONOSCOPY N/A 11/9/2020    Procedure: COLONOSCOPY;  Surgeon: Eric Sher MD;  Location: [facility];  Service: Endoscopy;  Laterality: N/A;     Family History   Problem Relation Age of Onset    Lymphoma Mother         non hodgkins    Cancer Father      Social History     Tobacco Use    Smoking status: Never    Smokeless tobacco: Never   Substance Use Topics    Alcohol use: No        Review of Systems:  As noted in HPI    OBJECTIVE:     Vital Signs (Most Recent):       Physical Exam:  General: well developed, well nourished, no distress  Head: normocephalic, atraumatic  Neurologic: Alert and oriented. Thought content appropriate  GCS: Motor: 6/Verbal: 5/Eyes: 4 GCS Total: 15  Language: No aphasia  Speech: No  dysarthria  Cranial nerves: face symmetric, tongue midline, CN II-XII grossly intact.   Eyes: pupils equal, round, reactive to light with accommodation, EOMI.   Pulmonary: normal respirations, not labored, no accessory muscles used  Sensory: decreased light touch left anterior thigh and foot    Strength  Deltoids Triceps Biceps Wrist Extension Wrist Flexion Hand    Upper: R          L           Iliopsoas Quadriceps Knee  Flexion Tibialis  anterior Gastro- cnemius EHL   Lower: R 5/5 5/5 5/5 5/5 5/5 5/5    L 2/5 4+/5 4+/5 1/5 4/5 1/5     DTR's - 2 + and symmetric triceps, biceps, brachioradialis, patellar, & achilles  Burnett: absent  Clonus: absent  Babinski: absent     SI signs: Negative     Pain on Hip ROM: Negative  Straight leg raise: -      Diagnostic Results:  I have personally reviewed imaging and agree with the findings.     Lumbar   Levoscoliosis of the spine.  Previous posterior fusion from L1 through S1.  Intact hardware.  Severe degenerative disc disease throughout the lumbar spine with anterior spurring.  Mild wedging of the T12 vertebral body anteriorly consistent with chronic compression fracture.  Severe degenerative disc disease from T10 through T12 with anterior spurring and endplate sclerosis.  SI joints are unremarkable.    Assessment:  Diagnosis: s/p L2-S1 fusion, new left leg weakness and numbness.  I suspect she has stenosis above her previous fusion.  2-plane spinal deformity with imbalance.    Plan: She needs to have complete imaging work up with scoliosis films, CT lumbar and MRI.    Follow-up: Will likely need referral to deformity spine clinic.    Ramos Lemos MD, FAANS

## 2023-07-21 ENCOUNTER — HOSPITAL ENCOUNTER (OUTPATIENT)
Dept: RADIOLOGY | Facility: HOSPITAL | Age: 78
Discharge: HOME | End: 2023-07-21
Attending: NEUROLOGICAL SURGERY
Payer: MEDICARE

## 2023-07-21 DIAGNOSIS — M48.07 SPINAL STENOSIS, LUMBOSACRAL REGION: ICD-10-CM

## 2023-07-21 PROCEDURE — 72131 CT LUMBAR SPINE WITHOUT CONTRAST: ICD-10-PCS | Mod: 26,,, | Performed by: RADIOLOGY

## 2023-07-21 PROCEDURE — 72131 CT LUMBAR SPINE W/O DYE: CPT | Mod: 26,,, | Performed by: RADIOLOGY

## 2023-07-21 PROCEDURE — 72131 CT LUMBAR SPINE W/O DYE: CPT | Mod: TC

## 2023-08-02 ENCOUNTER — HOSPITAL ENCOUNTER (OUTPATIENT)
Dept: RADIOLOGY | Facility: HOSPITAL | Age: 78
Discharge: HOME | End: 2023-08-02
Attending: NEUROLOGICAL SURGERY
Payer: MEDICARE

## 2023-08-02 DIAGNOSIS — M54.9 DORSALGIA, UNSPECIFIED: ICD-10-CM

## 2023-08-02 PROCEDURE — 72148 MRI LUMBAR SPINE W/O DYE: CPT | Mod: TC

## 2023-08-02 PROCEDURE — 72148 MRI LUMBAR SPINE W/O DYE: CPT | Mod: 26,,, | Performed by: RADIOLOGY

## 2023-08-02 PROCEDURE — 72148 MRI LUMBAR SPINE WITHOUT CONTRAST: ICD-10-PCS | Mod: 26,,, | Performed by: RADIOLOGY

## 2023-08-17 ENCOUNTER — PATIENT MESSAGE (OUTPATIENT)
Dept: ADMINISTRATIVE | Facility: HOSPITAL | Age: 78
End: 2023-08-17
Payer: MEDICARE

## 2023-08-18 ENCOUNTER — OFFICE VISIT (OUTPATIENT)
Dept: NEUROSURGERY | Facility: CLINIC | Age: 78
End: 2023-08-18
Payer: MEDICARE

## 2023-08-18 ENCOUNTER — TELEPHONE (OUTPATIENT)
Dept: FAMILY MEDICINE | Facility: CLINIC | Age: 78
End: 2023-08-18
Payer: MEDICARE

## 2023-08-18 DIAGNOSIS — Z98.1 S/P LUMBAR FUSION: ICD-10-CM

## 2023-08-18 DIAGNOSIS — K76.9 LESION OF LIVER: Primary | ICD-10-CM

## 2023-08-18 DIAGNOSIS — R29.898 LEFT LEG WEAKNESS: ICD-10-CM

## 2023-08-18 DIAGNOSIS — M48.02 SPINAL STENOSIS, CERVICAL REGION: Primary | ICD-10-CM

## 2023-08-18 DIAGNOSIS — M41.9 SCOLIOSIS, UNSPECIFIED SCOLIOSIS TYPE, UNSPECIFIED SPINAL REGION: ICD-10-CM

## 2023-08-18 PROCEDURE — 99214 PR OFFICE/OUTPT VISIT, EST, LEVL IV, 30-39 MIN: ICD-10-PCS | Mod: 95,,, | Performed by: NURSE PRACTITIONER

## 2023-08-18 PROCEDURE — 99214 OFFICE O/P EST MOD 30 MIN: CPT | Mod: 95,,, | Performed by: NURSE PRACTITIONER

## 2023-08-18 NOTE — PROGRESS NOTES
Neurosurgery  History & Physical    SUBJECTIVE:   Established Patient - Telehealth Visit     The patient location is: Home  The chief complaint leading to consultation is: Scoliosis  Visit type: Virtual visit with audio and video  Total time spent with patient: 30 minutes     Each patient to whom I provide medical services by telemedicine is:  (1) informed of the relationship between the physician and patient and the respective role of any other health care provider with respect to management of the patient; and (2) notified that they may decline to receive medical services by telemedicine and may withdraw from such care at any time. Patient verbally consented to receive this service via voice-only telephone call.     This service was not originating from a related E/M service provided within the previous 7 days nor will  to an E/M service or procedure within the next 24 hours or my soonest available appointment.  Prevailing standard of care was able to be met in this audio-only visit.       History of Present Illness: Emilia Pablo is a 78 y.o. female with PMH of osteoporosis- on Prolia, HTN, and Abreu-Stickler Syndrome. She has a surgical hx of 2001 cervical fusion and 2008 lumbosacral fusion - EJ - did well from surgery, chronic foot drop on left since surgery. She is being seen virtually today as a referral from Dr. Lemos for Dr. Chand to evaluate for deformity surgery. States that for the past 3 months she noticed worsening left leg weakness, balance problems, and back pain. She attempted PT but was only able to complete 2 weeks due to the progressive weakness and pain. States that she was ambulating in May.  Now requires walker for short distances and a wheelchair for longer distances.   Today, she reports continued left leg weakness but is now starting to notice some weakness in the right leg. Denies b/b dysfunction or saddle anesthesia. She is fearful of falling.     Review of patient's allergies  indicates:  No Known Allergies    Current Outpatient Medications   Medication Sig Dispense Refill    acetaminophen (TYLENOL) 650 MG TbSR Take 650 mg by mouth.      b complex vitamins capsule Take 1 capsule by mouth once daily.      celecoxib (CELEBREX) 200 MG capsule TAKE 1 CAPSULE EVERY DAY 90 capsule 3    cyclobenzaprine (FLEXERIL) 5 MG tablet take 1 BY MOUTH TWICE DAILY AS NEEDED FOR MUSCLE SPASMS (Patient not taking: Reported on 2023) 60 tablet 5    denosumab (PROLIA) 60 mg/mL Syrg Prolia   EVERY 6MONTHS      fish oil-omega-3 fatty acids 300-1,000 mg capsule Take 2 g by mouth once daily.      gabapentin (NEURONTIN) 300 MG capsule 2 nightly and one during day as needed for nerve pain 270 capsule 3    glucosamine-chondroitin 500-400 mg tablet Take 1 tablet by mouth 2 (two) times daily.      ibuprofen (ADVIL,MOTRIN) 800 MG tablet Take 1 tablet (800 mg total) by mouth daily as needed for Pain. 30 tablet 2    olmesartan (BENICAR) 5 MG Tab TAKE 1 TABLET EVERY DAY 90 tablet 3    vitamin D 1000 units Tab Take 2,000 Units by mouth once daily.       No current facility-administered medications for this visit.       Past Medical History:   Diagnosis Date    Arthritis     osteoarthritis    Hypertension     Abreu-Stickler syndrome      Past Surgical History:   Procedure Laterality Date    acdf      2 level    BACK SURGERY      laminectomy    BACK SURGERY      lumbar fusion L1-S1    CARDIAC CATHETERIZATION  2016    Normal Dr Wright; see media note; normal     SECTION      x2    EYE SURGERY Bilateral     laser   -   Nano's     JOINT REPLACEMENT Left 2013    JOINT REPLACEMENT Right 5/11/15    TONSILLECTOMY       Family History       Problem Relation (Age of Onset)    Arthritis Mother    Cancer Sister    Crohn's disease Son    Diabetes Mother    Heart disease Mother, Brother, Sister    No Known Problems Son    Stickler syndrome Son          Social History     Socioeconomic History    Marital  status:      Spouse name: Vicky Hillman   Occupational History     Comment: retired nurse   Tobacco Use    Smoking status: Never     Passive exposure: Never    Smokeless tobacco: Never   Substance and Sexual Activity    Alcohol use: Yes     Comment: occasional     Social Determinants of Health     Stress: Stress Concern Present (12/19/2019)    [facility] of Occupational Health - Occupational Stress Questionnaire     Feeling of Stress : Rather much       Review of Systems  Constitutional: no fever, chills or night sweats. No changes in weight   Eyes: no visual changes   ENT: no nasal congestion or sore throat   Respiratory: no cough or shortness of breath   Cardiovascular: no chest pain or palpitations   Gastrointestinal: no nausea or vomiting   Genitourinary: no hematuria or dysuria   Integument/Breast: no rash or pruritis   Hematologic/Lymphatic: no easy bruising or lymphadenopathy   Musculoskeletal: no arthralgias or myalgias.   Neurological: no seizures or tremors   Behavioral/Psych: no auditory or visual hallucinations   Endocrine: no heat or cold intolerance    OBJECTIVE:     Vital Signs     There is no height or weight on file to calculate BMI.      Neurosurgery Physical Exam  General: well developed, well nourished, no distress.   Head: normocephalic  Neurologic: Alert and oriented. Thought content appropriate.  GCS: Motor: 6/Verbal: 5/Eyes: 4 GCS Total: 15  Mental Status: Awake, Alert, Oriented x 4  Language: No aphasia  Speech: No dysarthria  Cranial nerves: CN II-XII grossly intact.   Eyes: EOMI appear grossly intact  Pulmonary: no signs of respiratory distress      Diagnostic Results:  I have personally reviewed the imaging:    Scoliosis series dated 7/20/23, which shows levoscoliosis at the thoracolumbar junction.    2.   CT lumbar dated 7/21/23, which shows posterior instrumented fusion L1 through S1. Multilevel degenerative changes.     3.  MRI lumbar spine dated 8/2/23, which  shows postsurgical changes of L1-S1 posterior decompression and fusion.  Fluid collection with thin septations within the laminectomy site extending from the superior endplate of L2 to the superior endplate of S1.  This likely represents postop seroma. Lumbar spondylosis most notable at L5-S1 with moderate bilateral neural foraminal narrowing.     ASSESSMENT/PLAN:   Emilia Pablo is a 78 y.o. female  with PMH of osteoporosis- on Prolia, HTN, and Abreu-Stickler Syndrome. She has a surgical hx of 2001 cervical fusion and 2008 lumbosacral fusion - EJ - did well from surgery, chronic foot drop on left since surgery. She was seen virtually today as a referral from Dr. Lemos for Dr. Chand to evaluate for deformity surgery. States that for the past 3 months she noticed worsening left leg weakness, balance problems, and back pain. She attempted PT but was only able to complete 2 weeks due to the progressive weakness and pain. I have ordered:    - MRI cervical and thoracic spine given her scoliosis and weakness  - Dynamic x-rays for instability  - We discussed that if her symptoms progress then she may need to be evaluated in the ED. She verbalized understanding.     I would like the patient to follow-up in clinic with Dr. Chand. I have encouraged her to contact the clinic with any questions, concerns, or adverse clinical changes. She verbalized understanding.      REGINALD Pedraza-VIVEK  Neurosurgery  [facility].      Note dictated with voice recognition software, please excuse any grammatical errors.

## 2023-08-19 NOTE — TELEPHONE ENCOUNTER
Previously ordered liver u/s needs to be done at [facility], pt requrest, call pt

## 2023-08-21 ENCOUNTER — ANESTHESIA EVENT (OUTPATIENT)
Dept: SURGERY | Facility: HOSPITAL | Age: 78
DRG: 460 | End: 2023-08-21
Payer: MEDICARE

## 2023-08-21 ENCOUNTER — HOSPITAL ENCOUNTER (INPATIENT)
Facility: HOSPITAL | Age: 78
LOS: 7 days | Discharge: NURSING, REHAB OR LONG-TERM CARE | DRG: 460 | End: 2023-08-28
Attending: STUDENT IN AN ORGANIZED HEALTH CARE EDUCATION/TRAINING PROGRAM | Admitting: NEUROLOGICAL SURGERY
Payer: MEDICARE

## 2023-08-21 DIAGNOSIS — M48.04 THORACIC STENOSIS: Primary | ICD-10-CM

## 2023-08-21 DIAGNOSIS — G95.9 ACUTE MYELOPATHY: ICD-10-CM

## 2023-08-21 DIAGNOSIS — M48.04 THORACIC SPINAL STENOSIS: ICD-10-CM

## 2023-08-21 DIAGNOSIS — Z01.818 PREOP EXAMINATION: ICD-10-CM

## 2023-08-21 DIAGNOSIS — M48.00 SPINAL STENOSIS, UNSPECIFIED SPINAL REGION: ICD-10-CM

## 2023-08-21 DIAGNOSIS — R33.9 URINARY RETENTION: ICD-10-CM

## 2023-08-21 LAB
ABO + RH BLD: NORMAL
ALBUMIN SERPL BCP-MCNC: 4 G/DL (ref 3.5–5.2)
ALP SERPL-CCNC: 42 U/L (ref 55–135)
ALT SERPL W/O P-5'-P-CCNC: 15 U/L (ref 10–44)
ANION GAP SERPL CALC-SCNC: 8 MMOL/L (ref 8–16)
APTT PPP: <21 SEC (ref 21–32)
AST SERPL-CCNC: 20 U/L (ref 10–40)
BASOPHILS # BLD AUTO: 0.04 K/UL (ref 0–0.2)
BASOPHILS NFR BLD: 0.6 % (ref 0–1.9)
BILIRUB SERPL-MCNC: 0.6 MG/DL (ref 0.1–1)
BILIRUB UR QL STRIP: NEGATIVE
BLD GP AB SCN CELLS X3 SERPL QL: NORMAL
BUN SERPL-MCNC: 12 MG/DL (ref 8–23)
CALCIUM SERPL-MCNC: 9.2 MG/DL (ref 8.7–10.5)
CHLORIDE SERPL-SCNC: 105 MMOL/L (ref 95–110)
CLARITY UR REFRACT.AUTO: CLEAR
CO2 SERPL-SCNC: 24 MMOL/L (ref 23–29)
COLOR UR AUTO: YELLOW
CREAT SERPL-MCNC: 0.6 MG/DL (ref 0.5–1.4)
CRP SERPL-MCNC: <0.3 MG/L (ref 0–8.2)
DIFFERENTIAL METHOD: ABNORMAL
EOSINOPHIL # BLD AUTO: 0 K/UL (ref 0–0.5)
EOSINOPHIL NFR BLD: 0.6 % (ref 0–8)
ERYTHROCYTE [DISTWIDTH] IN BLOOD BY AUTOMATED COUNT: 13.1 % (ref 11.5–14.5)
ERYTHROCYTE [SEDIMENTATION RATE] IN BLOOD BY PHOTOMETRIC METHOD: 12 MM/HR (ref 0–36)
EST. GFR  (NO RACE VARIABLE): >60 ML/MIN/1.73 M^2
GLUCOSE SERPL-MCNC: 88 MG/DL (ref 70–110)
GLUCOSE UR QL STRIP: NEGATIVE
HCT VFR BLD AUTO: 35.1 % (ref 37–48.5)
HCV AB SERPL QL IA: REACTIVE
HGB BLD-MCNC: 11.6 G/DL (ref 12–16)
HGB UR QL STRIP: NEGATIVE
HIV 1+2 AB+HIV1 P24 AG SERPL QL IA: NORMAL
IMM GRANULOCYTES # BLD AUTO: 0.02 K/UL (ref 0–0.04)
IMM GRANULOCYTES NFR BLD AUTO: 0.3 % (ref 0–0.5)
INR PPP: 0.9 (ref 0.8–1.2)
KETONES UR QL STRIP: ABNORMAL
LEUKOCYTE ESTERASE UR QL STRIP: NEGATIVE
LYMPHOCYTES # BLD AUTO: 1.2 K/UL (ref 1–4.8)
LYMPHOCYTES NFR BLD: 18.5 % (ref 18–48)
MCH RBC QN AUTO: 29.7 PG (ref 27–31)
MCHC RBC AUTO-ENTMCNC: 33 G/DL (ref 32–36)
MCV RBC AUTO: 90 FL (ref 82–98)
MONOCYTES # BLD AUTO: 0.5 K/UL (ref 0.3–1)
MONOCYTES NFR BLD: 6.8 % (ref 4–15)
NEUTROPHILS # BLD AUTO: 4.9 K/UL (ref 1.8–7.7)
NEUTROPHILS NFR BLD: 73.2 % (ref 38–73)
NITRITE UR QL STRIP: NEGATIVE
NRBC BLD-RTO: 0 /100 WBC
PH UR STRIP: 7 [PH] (ref 5–8)
PLATELET # BLD AUTO: 241 K/UL (ref 150–450)
PMV BLD AUTO: 9.5 FL (ref 9.2–12.9)
POTASSIUM SERPL-SCNC: 4.1 MMOL/L (ref 3.5–5.1)
PROT SERPL-MCNC: 6.8 G/DL (ref 6–8.4)
PROT UR QL STRIP: NEGATIVE
PROTHROMBIN TIME: 10.2 SEC (ref 9–12.5)
RBC # BLD AUTO: 3.91 M/UL (ref 4–5.4)
SODIUM SERPL-SCNC: 137 MMOL/L (ref 136–145)
SP GR UR STRIP: 1.01 (ref 1–1.03)
SPECIMEN OUTDATE: NORMAL
URN SPEC COLLECT METH UR: ABNORMAL
WBC # BLD AUTO: 6.64 K/UL (ref 3.9–12.7)

## 2023-08-21 PROCEDURE — 86920 COMPATIBILITY TEST SPIN: CPT | Performed by: NURSE PRACTITIONER

## 2023-08-21 PROCEDURE — 20600001 HC STEP DOWN PRIVATE ROOM

## 2023-08-21 PROCEDURE — 12000002 HC ACUTE/MED SURGE SEMI-PRIVATE ROOM

## 2023-08-21 PROCEDURE — 93010 EKG 12-LEAD: ICD-10-PCS | Mod: ,,, | Performed by: INTERNAL MEDICINE

## 2023-08-21 PROCEDURE — 86920 COMPATIBILITY TEST SPIN: CPT

## 2023-08-21 PROCEDURE — 87522 HEPATITIS C REVRS TRNSCRPJ: CPT | Performed by: STUDENT IN AN ORGANIZED HEALTH CARE EDUCATION/TRAINING PROGRAM

## 2023-08-21 PROCEDURE — 51798 US URINE CAPACITY MEASURE: CPT

## 2023-08-21 PROCEDURE — 87389 HIV-1 AG W/HIV-1&-2 AB AG IA: CPT | Performed by: PHYSICIAN ASSISTANT

## 2023-08-21 PROCEDURE — 99284 PR EMERGENCY DEPT VISIT,LEVEL IV: ICD-10-PCS | Mod: ,,, | Performed by: PHYSICIAN ASSISTANT

## 2023-08-21 PROCEDURE — 86140 C-REACTIVE PROTEIN: CPT | Performed by: STUDENT IN AN ORGANIZED HEALTH CARE EDUCATION/TRAINING PROGRAM

## 2023-08-21 PROCEDURE — 25000003 PHARM REV CODE 250: Performed by: PHYSICIAN ASSISTANT

## 2023-08-21 PROCEDURE — 99285 EMERGENCY DEPT VISIT HI MDM: CPT | Mod: 25

## 2023-08-21 PROCEDURE — 85025 COMPLETE CBC W/AUTO DIFF WBC: CPT | Performed by: STUDENT IN AN ORGANIZED HEALTH CARE EDUCATION/TRAINING PROGRAM

## 2023-08-21 PROCEDURE — 25000003 PHARM REV CODE 250: Performed by: STUDENT IN AN ORGANIZED HEALTH CARE EDUCATION/TRAINING PROGRAM

## 2023-08-21 PROCEDURE — 86803 HEPATITIS C AB TEST: CPT | Performed by: PHYSICIAN ASSISTANT

## 2023-08-21 PROCEDURE — 85730 THROMBOPLASTIN TIME PARTIAL: CPT | Performed by: STUDENT IN AN ORGANIZED HEALTH CARE EDUCATION/TRAINING PROGRAM

## 2023-08-21 PROCEDURE — 51702 INSERT TEMP BLADDER CATH: CPT

## 2023-08-21 PROCEDURE — 80053 COMPREHEN METABOLIC PANEL: CPT | Performed by: STUDENT IN AN ORGANIZED HEALTH CARE EDUCATION/TRAINING PROGRAM

## 2023-08-21 PROCEDURE — 85610 PROTHROMBIN TIME: CPT | Performed by: STUDENT IN AN ORGANIZED HEALTH CARE EDUCATION/TRAINING PROGRAM

## 2023-08-21 PROCEDURE — 81003 URINALYSIS AUTO W/O SCOPE: CPT | Performed by: STUDENT IN AN ORGANIZED HEALTH CARE EDUCATION/TRAINING PROGRAM

## 2023-08-21 PROCEDURE — 93010 ELECTROCARDIOGRAM REPORT: CPT | Mod: ,,, | Performed by: INTERNAL MEDICINE

## 2023-08-21 PROCEDURE — 99284 EMERGENCY DEPT VISIT MOD MDM: CPT | Mod: ,,, | Performed by: PHYSICIAN ASSISTANT

## 2023-08-21 PROCEDURE — 85652 RBC SED RATE AUTOMATED: CPT | Performed by: STUDENT IN AN ORGANIZED HEALTH CARE EDUCATION/TRAINING PROGRAM

## 2023-08-21 PROCEDURE — 86900 BLOOD TYPING SEROLOGIC ABO: CPT | Performed by: STUDENT IN AN ORGANIZED HEALTH CARE EDUCATION/TRAINING PROGRAM

## 2023-08-21 PROCEDURE — 93005 ELECTROCARDIOGRAM TRACING: CPT

## 2023-08-21 RX ORDER — HYDRALAZINE HYDROCHLORIDE 20 MG/ML
10 INJECTION INTRAMUSCULAR; INTRAVENOUS EVERY 6 HOURS PRN
Status: DISCONTINUED | OUTPATIENT
Start: 2023-08-21 | End: 2023-08-28 | Stop reason: HOSPADM

## 2023-08-21 RX ORDER — FAMOTIDINE 20 MG/1
20 TABLET, FILM COATED ORAL DAILY PRN
Status: DISCONTINUED | OUTPATIENT
Start: 2023-08-21 | End: 2023-08-28 | Stop reason: HOSPADM

## 2023-08-21 RX ORDER — SODIUM CHLORIDE 9 MG/ML
INJECTION, SOLUTION INTRAVENOUS CONTINUOUS
Status: DISCONTINUED | OUTPATIENT
Start: 2023-08-22 | End: 2023-08-22

## 2023-08-21 RX ORDER — GABAPENTIN 300 MG/1
300 CAPSULE ORAL NIGHTLY
Status: DISCONTINUED | OUTPATIENT
Start: 2023-08-21 | End: 2023-08-26

## 2023-08-21 RX ORDER — GLUCAGON 1 MG
1 KIT INJECTION
Status: DISCONTINUED | OUTPATIENT
Start: 2023-08-21 | End: 2023-08-28 | Stop reason: HOSPADM

## 2023-08-21 RX ORDER — METHOCARBAMOL 750 MG/1
750 TABLET, FILM COATED ORAL EVERY 8 HOURS PRN
Status: DISCONTINUED | OUTPATIENT
Start: 2023-08-21 | End: 2023-08-28

## 2023-08-21 RX ORDER — LORATADINE 10 MG/1
10 TABLET ORAL DAILY PRN
COMMUNITY

## 2023-08-21 RX ORDER — IBUPROFEN 200 MG
16 TABLET ORAL
Status: DISCONTINUED | OUTPATIENT
Start: 2023-08-21 | End: 2023-08-28 | Stop reason: HOSPADM

## 2023-08-21 RX ORDER — OXYCODONE HYDROCHLORIDE 5 MG/1
5 TABLET ORAL
Status: COMPLETED | OUTPATIENT
Start: 2023-08-21 | End: 2023-08-21

## 2023-08-21 RX ORDER — IBUPROFEN 200 MG
24 TABLET ORAL
Status: DISCONTINUED | OUTPATIENT
Start: 2023-08-21 | End: 2023-08-28 | Stop reason: HOSPADM

## 2023-08-21 RX ORDER — OXYMETAZOLINE HCL 0.05 %
2 SPRAY, NON-AEROSOL (ML) NASAL DAILY PRN
COMMUNITY

## 2023-08-21 RX ORDER — ACETAMINOPHEN 325 MG/1
650 TABLET ORAL EVERY 4 HOURS PRN
Status: DISCONTINUED | OUTPATIENT
Start: 2023-08-21 | End: 2023-08-28 | Stop reason: HOSPADM

## 2023-08-21 RX ADMIN — OXYCODONE HYDROCHLORIDE 5 MG: 5 TABLET ORAL at 03:08

## 2023-08-21 RX ADMIN — GABAPENTIN 300 MG: 300 CAPSULE ORAL at 11:08

## 2023-08-21 RX ADMIN — ACETAMINOPHEN 650 MG: 325 TABLET ORAL at 11:08

## 2023-08-21 NOTE — H&P
See consult note dated 8/21 for full H&P.    Rashida Swain PA-C  Neurosurgery  [facility]

## 2023-08-21 NOTE — ANESTHESIA PREPROCEDURE EVALUATION
[facility]  Anesthesia Pre-Operative Evaluation         Patient Name: Emilia Pablo  YOB: 1945  MRN: [id number]    SUBJECTIVE:     Pre-operative evaluation for Procedure(s) (LRB):  FUSION, SPINE (N/A)     08/21/2023    Emilia Pablo is a 78 y.o. female w/ a significant PMHx of HTN, headaches, arthritis, and thoracic stenosis.    Patient now presents for the above procedure(s).      LDA: None documented.       Peripheral IV - Single Lumen 08/21/23 1116 20 G Distal;Posterior;Right Forearm (Active)   Site Assessment Clean;Dry;Intact;No redness;No swelling 08/21/23 1117   Extremity Assessment Distal to IV No abnormal discoloration;No redness;No swelling;No warmth 08/21/23 1117   Line Status Blood return noted;Flushed;Saline locked 08/21/23 1117   Dressing Status Clean;Dry;Intact 08/21/23 1117   Dressing Intervention First dressing 08/21/23 1117   Number of days: 0            Urethral Catheter 08/21/23 1530 Non-latex 16 Fr. (Active)   Number of days: 0       Prev airway: None documented.      Patient Active Problem List   Diagnosis    Neck pain    Hypertension    Primary insomnia    Chronic mixed headache syndrome    Chronic pain of both shoulders    Arthritis    Age-related osteoporosis without current pathological fracture    Abreu-Stickler syndrome       Review of patient's allergies indicates:  No Known Allergies    Current Inpatient Medications:      No current facility-administered medications on file prior to encounter.     Current Outpatient Medications on File Prior to Encounter   Medication Sig Dispense Refill    acetaminophen (TYLENOL) 650 MG TbSR Take 650 mg by mouth every 8 (eight) hours as needed (pain).      b complex vitamins capsule Take 1 capsule by mouth once daily.      celecoxib (CELEBREX) 200 MG capsule TAKE 1 CAPSULE EVERY DAY 90 capsule 3    denosumab (PROLIA) 60 mg/mL Syrg Inject 60 mg into the skin every 6 (six) months.      famotidine (PEPCID  ORAL) Take 1 tablet by mouth daily as needed (heartburn).      gabapentin (NEURONTIN) 300 MG capsule 2 nightly and one during day as needed for nerve pain (Patient taking differently: Take 300 mg by mouth every evening.) 270 capsule 3    glucosamine-chondroitin 500-400 mg tablet Take 1 tablet by mouth 2 (two) times daily.      loratadine (CLARITIN) 10 mg tablet Take 10 mg by mouth daily as needed for Allergies.      olmesartan (BENICAR) 5 MG Tab TAKE 1 TABLET EVERY DAY 90 tablet 3    oxymetazoline (AFRIN) 0.05 % nasal spray 2 sprays by Nasal route daily as needed for Congestion.      vitamin D 1000 units Tab Take 2,000 Units by mouth once daily.      fish oil-omega-3 fatty acids 300-1,000 mg capsule Take 2 g by mouth once daily.         Past Surgical History:   Procedure Laterality Date    acdf      2 level    BACK SURGERY      laminectomy    BACK SURGERY      lumbar fusion L1-S1    CARDIAC CATHETERIZATION  2016    Normal Dr Wright; see media note; normal     SECTION      x2    EYE SURGERY Bilateral     laser   -   Nano's     JOINT REPLACEMENT Left 2013    JOINT REPLACEMENT Right 5/11/15    TONSILLECTOMY         Social History     Socioeconomic History    Marital status:      Spouse name: Vicky Hillman   Occupational History     Comment: retired nurse   Tobacco Use    Smoking status: Never     Passive exposure: Never    Smokeless tobacco: Never   Substance and Sexual Activity    Alcohol use: Yes     Comment: occasional     Social Determinants of Health     Stress: Stress Concern Present (2019)    [employer] of Occupational Health - Occupational Stress Questionnaire     Feeling of Stress : Rather much       OBJECTIVE:     Vital Signs Range (Last 24H):  Temp:  [36.5 °C (97.7 °F)-36.8 °C (98.3 °F)]   Pulse:  [75-88]   Resp:  [16-18]   BP: (147-166)/(65-92)   SpO2:  [95 %-100 %]       Significant Labs:  Lab Results   Component Value Date     WBC 6.64 08/21/2023    HGB 11.6 (L) 08/21/2023    HCT 35.1 (L) 08/21/2023     08/21/2023    CHOL 244 (A) 12/08/2022    TRIG 61 12/08/2022     (A) 12/08/2022    ALT 15 08/21/2023    AST 20 08/21/2023     08/21/2023    K 4.1 08/21/2023     08/21/2023    CREATININE 0.6 08/21/2023    BUN 12 08/21/2023    CO2 24 08/21/2023    TSH 1.190 10/15/2015    INR 0.9 08/21/2023       Diagnostic Studies: No relevant studies.    EKG:   Results for orders placed or performed during the hospital encounter of 05/05/15   EKG 12-lead    Collection Time: 05/05/15  1:38 PM    Narrative    Test Reason : PREOP  Blood Pressure : mmHG  Vent. Rate : 063 BPM     Atrial Rate : 063 BPM     P-R Int : 162 ms          QRS Dur : 080 ms      QT Int : 406 ms       P-R-T Axes : 078 -21 042 degrees     QTc Int : 415 ms    Normal sinus rhythm  Normal ECG    Confirmed by Pete Luna MD ([id number]) on 5/5/2015 7:24:39 PM    Referred By: DARIUS ADLER           Confirmed By:Pete Luna MD       2D ECHO:  TTE:  No results found for this or any previous visit.    LISETTE:  No results found for this or any previous visit.    ASSESSMENT/PLAN:         Pre-op Assessment    I have reviewed the Patient Summary Reports.     I have reviewed the Nursing Notes.    I have reviewed the Medications.     Review of Systems  Anesthesia Hx:  No problems with previous Anesthesia  History of prior surgery of interest to airway management or planning: Denies Family Hx of Anesthesia complications.   Denies Personal Hx of Anesthesia complications.   Social:  Non-Smoker, No Alcohol Use    Hematology/Oncology:         -- Denies Anemia: Denies Current/Recent Cancer   Cardiovascular:   Hypertension Denies CAD.    Denies Dysrhythmias.   Denies CHF. no hyperlipidemia    Pulmonary:   Denies COPD.  Denies Asthma.  Denies Sleep Apnea.    Renal/:   Denies Chronic Renal Disease.     Hepatic/GI:   Denies GERD. Denies Liver Disease.    Musculoskeletal:   Arthritis      Neurological:   Denies CVA. Denies Neuromuscular Disease.  Headaches Denies Seizures.   Denies Chronic Pain Syndrome   Endocrine:   Denies Diabetes. Denies Hyperthyroidism.  Denies Obesity / BMI > 30  Psych:   denies anxiety denies depression          Physical Exam  General: Well nourished, Cooperative, Alert and Oriented    Airway:  Mallampati: II   Mouth Opening: Normal  TM Distance: Normal  Tongue: Normal  Neck ROM: Extension Decreased, Left Lateral Motion Decreased, Right Lateral Motion Decreased, Flexion Decreased    Dental:  Intact        Anesthesia Plan  Type of Anesthesia, risks & benefits discussed:    Anesthesia Type: Gen ETT  Intra-op Monitoring Plan: Standard ASA Monitors and Art Line  Post Op Pain Control Plan: multimodal analgesia and IV/PO Opioids PRN  Induction:  IV  Airway Plan: Direct and Video, Post-Induction  Informed Consent: Informed consent signed with the Patient and all parties understand the risks and agree with anesthesia plan.  All questions answered.   ASA Score: 3  Day of Surgery Review of History & Physical: H&P Update referred to the surgeon/provider.    Ready For Surgery From Anesthesia Perspective.     .

## 2023-08-21 NOTE — ED NOTES
Patient presents to ED with family. Patient reports hx of spinal stenosis that began in May. Patient states saw PCP then had follow up virtual appointment with neurosx NP. Patient is scheduled to have MRIs, but is unsure when they are scheduled for. Patient states over the past 2 weeks has been suffering falls and notices that numbness to bilateral legs is getting worse. Patient states that footdrop to left foot began in 2018. Patient states that she cannot feel when she is standing on her left foot and states that right foot numbness has been getting worse. Patient states most recent fall on Thursday, denies hitting head. Patient attributes falls to weakness/ not being able to feel legs. Patient denies dizziness/ lightheaded/ LOC. Patient states in the past week has also noticed incontinence intermittently. Patient states son will sometimes have to inform patient of incontinence then patient goes to restroom and finishes.

## 2023-08-21 NOTE — PHARMACY MED REC
"Admission Medication History     The home medication history was taken by Elisabeth Almeida.    You may go to "Admission" then "Reconcile Home Medications" tabs to review and/or act upon these items.     The home medication list has been updated by the Pharmacy department.   Please read ALL comments highlighted in yellow.   Please address this information as you see fit.    Feel free to contact us if you have any questions or require assistance.      The medications listed below were removed from the home medication list. Please reorder if appropriate:  Patient reports no longer taking the following medication(s):  CYCLOBENZAPRINE 5 MG  FISH OIL  IBUPROFEN 800 MG    Medications listed below were obtained from: Patient/family  Current Outpatient Medications on File Prior to Encounter   Medication Sig    acetaminophen (TYLENOL) 650 MG TbSR Take 650 mg by mouth every 8 (eight) hours as needed (pain).    b complex vitamins capsule Take 1 capsule by mouth once daily.    celecoxib (CELEBREX) 200 MG capsule TAKE 1 CAPSULE EVERY DAY    denosumab (PROLIA) 60 mg/mL Syrg Inject 60 mg into the skin every 6 (six) months.    famotidine (PEPCID ORAL) Take 1 tablet by mouth daily as needed (heartburn).    gabapentin (NEURONTIN) 300 MG capsule Take 300 mg by mouth every evening.    glucosamine-chondroitin 500-400 mg tablet Take 1 tablet by mouth 2 (two) times daily.    loratadine (CLARITIN) 10 mg tablet Take 10 mg by mouth daily as needed for Allergies.    olmesartan (BENICAR) 5 MG Tab TAKE 1 TABLET EVERY DAY    oxymetazoline (AFRIN) 0.05 % nasal spray 2 sprays by Nasal route daily as needed for Congestion.    vitamin D 1000 units Tab Take 2,000 Units by mouth once daily.    fish oil-omega-3 fatty acids 300-1,000 mg capsule Take 2 g by mouth once daily.             Elisabeth Almeida  EXT [id number]                  .          "

## 2023-08-21 NOTE — ED NOTES
Bladder scan 660. Patient informed need for catheter. Patient requesting to try on bedpan prior to catheter. Patient placed on bedpan.

## 2023-08-21 NOTE — ED PROVIDER NOTES
Source of History  patient, family, and EMR    Chief Complaint    Multiple complaints (Having more falls, hx spinal stenosis, denies bowel bladder incontinence)      History of Present Illness    Emilia Pablo is a 78 y.o. female presenting with concerns for worsening weakness with multiple falls.  The patient has been largely not ambulatory for the better part of the last several months but worse in the last several weeks mostly secondary to osteoporosis and significant stenosis that has previously required cervical and lumbar fusions with bilateral hip replacements.  Since late June she is had at least they falls.  No head strike or loss of consciousness ever, usually because her feet are numb and weak.  Left lower extremity is worse in the right lower extremity.  She is now starting to have right lower extremity symptoms which is concerning to her.  She is also had some bladder and bowel incontinence over the last 3 weeks without saddle anesthesias.  She sees Neurosurgery in the clinic.  They are trying to plan a neurosurgical intervention to address the worsening motor weakness and sensory deficits.      Review of Systems    As per HPI and below:  Constitutional symptoms:  No chills, no sweats, no fever   Skin symptoms:  No rash    Eye symptoms:  No blurred vision  ENMT symptoms:  No sore throat    Respiratory symptoms:  No shortness of breath  Cardiovascular symptoms:  No chest pain   Gastrointestinal symptoms:  No abdominal pain, no nausea, no vomiting, no diarrhea  Genitourinary symptoms:  No dysuria  Musculoskeletal symptoms:  Positive lower back pain, with general weakness of the lower extremities   Neurologic symptoms:  Positive urinary urgency, positive bowel incontinence, no saddle anesthesia, left lower extremity weakness is greater than right lower extremity but is in general worsening  Endocrine symptoms:  None except as in HPI     Past History    As per HPI and below:  Past Medical History:    Diagnosis Date    Arthritis     osteoarthritis    Hypertension     Abreu-Stickler syndrome        Past Surgical History:   Procedure Laterality Date    acdf  2001    2 level    BACK SURGERY      laminectomy    BACK SURGERY      lumbar fusion L1-S1    CARDIAC CATHETERIZATION  2016    Normal Dr Wright; see media note; normal     SECTION      x2    EYE SURGERY Bilateral     laser   -   [facility]     JOINT REPLACEMENT Left 2013    JOINT REPLACEMENT Right 5/11/15    TONSILLECTOMY         Social History     Socioeconomic History    Marital status:      Spouse name: Vicky Hillman   Occupational History     Comment: retired nurse   Tobacco Use    Smoking status: Never     Passive exposure: Never    Smokeless tobacco: Never   Substance and Sexual Activity    Alcohol use: Yes     Comment: occasional     Social Determinants of Health     Stress: Stress Concern Present (2019)    [employer] - Occupational Stress Questionnaire     Feeling of Stress : Rather much       Family History   Problem Relation Age of Onset    Crohn's disease Son     Stickler syndrome Son     Diabetes Mother     Heart disease Mother     Arthritis Mother     Cancer Sister         melanoma    Heart disease Brother     Heart disease Sister     No Known Problems Son        Review of patient's allergies indicates:  No Known Allergies    No current facility-administered medications on file prior to encounter.     Current Outpatient Medications on File Prior to Encounter   Medication Sig Dispense Refill    acetaminophen (TYLENOL) 650 MG TbSR Take 650 mg by mouth every 8 (eight) hours as needed (pain).      b complex vitamins capsule Take 1 capsule by mouth once daily.      celecoxib (CELEBREX) 200 MG capsule TAKE 1 CAPSULE EVERY DAY 90 capsule 3    denosumab (PROLIA) 60 mg/mL Syrg Inject 60 mg into the skin every 6 (six) months.      famotidine (PEPCID ORAL) Take 1 tablet by mouth daily  "as needed (heartburn).      gabapentin (NEURONTIN) 300 MG capsule 2 nightly and one during day as needed for nerve pain (Patient taking differently: Take 300 mg by mouth every evening.) 270 capsule 3    glucosamine-chondroitin 500-400 mg tablet Take 1 tablet by mouth 2 (two) times daily.      loratadine (CLARITIN) 10 mg tablet Take 10 mg by mouth daily as needed for Allergies.      olmesartan (BENICAR) 5 MG Tab TAKE 1 TABLET EVERY DAY 90 tablet 3    oxymetazoline (AFRIN) 0.05 % nasal spray 2 sprays by Nasal route daily as needed for Congestion.      vitamin D 1000 units Tab Take 2,000 Units by mouth once daily.      fish oil-omega-3 fatty acids 300-1,000 mg capsule Take 2 g by mouth once daily.      [DISCONTINUED] cyclobenzaprine (FLEXERIL) 5 MG tablet take 1 BY MOUTH TWICE DAILY AS NEEDED FOR MUSCLE SPASMS (Patient not taking: Reported on 5/30/2023) 60 tablet 5    [DISCONTINUED] ibuprofen (ADVIL,MOTRIN) 800 MG tablet Take 1 tablet (800 mg total) by mouth daily as needed for Pain. 30 tablet 2       Physical Exam    Reviewed nursing notes.  Vitals:    08/21/23 1011 08/21/23 1300 08/21/23 1308 08/21/23 1550   BP: (!) 147/78 (!) 147/65 (!) 147/65 (!) 166/92   Pulse: 85 76 75 88   Resp: 18 16 16 16   Temp: 98.3 °F (36.8 °C)  97.7 °F (36.5 °C) 97.8 °F (36.6 °C)   TempSrc: Oral  Oral Oral   SpO2: 99% 100% 100% 95%   Weight: 68.5 kg (151 lb)      Height: 5' 4" (1.626 m)        General:  Alert, no acute distress.    Skin:  Warm, dry, intact.  No rash.  Head:  Normocephalic, atraumatic.    Neck:  Supple.   HEENT:  Pupils are equal and round, appropriate for room, extraocular movements are intact.  Normal phonation.  Moist mucous membranes.  Cardiovascular:  Regular rate and rhythm, Normal peripheral perfusion, No edema.    Respiratory:  Lungs are clear to auscultation, respirations are non-labored, breath sounds are equal.    Gastrointestinal:  Soft, Nontender, Non distended.   Back:  Nontender. Normal gait.  " Ambulatory.  Musculoskeletal:  Normal range of motion observed.  Neurological:  Alert and oriented to person, place, time, and situation.  Sensory deficits and motor deficits are present  No focal cranial nerve deficits;  Strength 5/5 bilaterally; no upper limb sensory deficits, no tenderness to palpation of cervical to lumbar spinus processes. Hip flexor weakness: 1/5 bilaterally. Knee extension: 1/5 bilaterally; Dorsiflexion left 1/5, right 5/5; Plantar Flexion left 1/5, right 5/5. Sensory deficits in left lower extremity below lateral calf. Sensory deficit in left lateral cutaneous distribution  Psychiatric:  Cooperative, appropriate mood & affect.       Initial MDM and Data Review    78 y.o. female presenting for evaluation of concerns for worsening lower extremity weakness and sensory deficits in the setting of known spinal disease, followed by Neurosurgery in the clinic    Differential includes: new and worsening weakness due to cord compression /edema.  New/ worsening stenosis.  Pathologic lesion or new compression fx. Surgical complication.     Work-up includes: MRI as previously ordered by nsx team outpatient, labs, inflammatory markers, PVR/bladder scan    Interventions include: meza if needed, prn pain control    The patient has significant medical comorbidities that influence decision making for this acute process, such as: prior stenotic lesions    I decided to obtain the patient's medical records and review relevant documentation from hospital records and clinic records.  Pertinent information is noted.    MRI reviewed,NP notes from clinic reviewed      Medications   famotidine tablet 20 mg (has no administration in time range)   gabapentin capsule 300 mg (has no administration in time range)   glucose chewable tablet 16 g (has no administration in time range)   glucose chewable tablet 16 g (has no administration in time range)   glucose chewable tablet 24 g (has no administration in time range)    glucagon (human recombinant) injection 1 mg (has no administration in time range)   0.9%  NaCl infusion (has no administration in time range)   methocarbamoL tablet 750 mg (has no administration in time range)   acetaminophen tablet 650 mg (has no administration in time range)   dextrose 10% bolus 125 mL 125 mL (has no administration in time range)   dextrose 10% bolus 250 mL 250 mL (has no administration in time range)   oxyCODONE immediate release tablet 5 mg (5 mg Oral Given 8/21/23 1550)       Results and ED Course    Labs Reviewed   HEPATITIS C ANTIBODY - Abnormal; Notable for the following components:       Result Value    Hepatitis C Ab Reactive (*)     All other components within normal limits    Narrative:     Release to patient->Immediate   CBC W/ AUTO DIFFERENTIAL - Abnormal; Notable for the following components:    RBC 3.91 (*)     Hemoglobin 11.6 (*)     Hematocrit 35.1 (*)     Gran % 73.2 (*)     All other components within normal limits   COMPREHENSIVE METABOLIC PANEL - Abnormal; Notable for the following components:    Alkaline Phosphatase 42 (*)     All other components within normal limits   URINALYSIS, REFLEX TO URINE CULTURE - Abnormal; Notable for the following components:    Ketones, UA Trace (*)     All other components within normal limits    Narrative:     Specimen Source->Urine   HIV 1 / 2 ANTIBODY    Narrative:     Release to patient->Immediate   SEDIMENTATION RATE   C-REACTIVE PROTEIN   APTT   PROTIME-INR   HEPATITIS C RNA, QUANTITATIVE, PCR   HEPATITIS C RNA, QUANTITATIVE, PCR   TYPE & SCREEN       Imaging Results              CT Lumbar Spine Without Contrast (In process)  Result time 08/21/23 18:25:47                     CT Thoracic Spine Without Contrast (In process)                      MRI Thoracic Spine Without Contrast (Final result)  Result time 08/21/23 15:10:44      Final result by Santos Pacheco MD (08/21/23 15:10:44)                   Impression:      1. Disc extrusion  likely containing vacuum phenomenon or calcification at T10-T11 resulting in severe spinal canal stenosis.  Associated spinal cord edema or myelomalacia noted.  2. Fluid signal within the T10-T11 disc space favored to be degenerative.  Absence of endplate erosion and probable associated vacuum phenomenon favor degenerative etiology.  Recommend further evaluation with CT as well as clinical assessment.  3. Several T2 hyperintense lesions within the liver, incompletely characterized.  Recommend abdominal ultrasound for further evaluation.      Electronically signed by: Santos Pacheco MD  Date:    08/21/2023  Time:    15:10               Narrative:    EXAMINATION:  MRI THORACIC SPINE WITHOUT CONTRAST    CLINICAL HISTORY:  Mid-back pain;Trunk numbness or tingling;    TECHNIQUE:  Multiplanar, multisequence MRI of thoracic spine.  Contrast was not administered.    COMPARISON:  None    FINDINGS:  There are partially visualized postoperative changes in the lower cervical and upper lumbar spine.    Alignment: Normal.    Vertebrae: There is multilevel mild disc height loss with Schmorl's nodes.  No evidence for acute fracture.    Discs: Fluid signal noted within the T10-T11 disc space.  There is underlying sub endplate marrow edema but no endplate erosion.  There is extruded low signal material extending cranially along the T10 posterior cortex, perhaps vacuum phenomenon within a disc extrusion.    Cord: T2 hyperintense signal seen within the spinal cord at T10-T11.    Degenerative findings: Disc extrusion at T10-T11 with low signal, perhaps vacuum phenomenon extends cranially along the T10 posterior cortex and measures 1.9 x 1.0 x 1.7 cm (TV by AP by CC), resulting in severe spinal canal stenosis.  Note made of mild bilateral neural foraminal narrowing.  Additional small disc protrusion noted at T1-T2, T6-T7 and T7-T8.  No spinal canal stenosis or neural foraminal narrowing elsewhere in the thoracic spine.    Paraspinal  muscles & soft tissues: Several T2 hyperintense lesions are seen within the liver, incompletely characterized.                                       MRI Cervical Spine Without Contrast (Final result)  Result time 08/21/23 15:49:45      Final result by Santos Pacheco MD (08/21/23 15:49:45)                   Impression:      1. Postoperative change of ACDF at C5-C7.  2. Multilevel degenerative changes detailed above.  Severe spinal canal stenosis noted at C3-C4, moderate at C2-C3, C4-C5, and C6-C7.  Moderate to severe neural foraminal narrowing noted at C3-C5 and C6-T1.      Electronically signed by: Santos Pacheco MD  Date:    08/21/2023  Time:    15:49               Narrative:    EXAMINATION:  MRI CERVICAL SPINE WITHOUT CONTRAST    CLINICAL HISTORY:  Spinal stenosis, cervical;    TECHNIQUE:  Multiplanar, multisequence MR images of the cervical spine were performed utilizing no contrast.    COMPARISON:  None.    FINDINGS:  Postoperative change of ACDF noted at C5-C7.  Solid osseous fusion seen across the disc spaces.  No evidence for hardware loosening.    C1-C2: Dens is intact.  Pre dens space is maintained.    Alignment: Grade 1 retrolisthesis at C2-C3 and C4-C5.  Grade 1 anterolisthesis at C3-C4.    Vertebrae: No acute fracture.  No evidence for marrow infiltrative process.    Discs: Moderate to severe disc height loss at C3-C5.  No evidence for discitis.    Cord: Normal signal.    Skull base and craniocervical junction: Normal.    Degenerative findings:    C2-C3: Posterior disc osteophyte complex and facet arthropathy result in moderate spinal canal stenosis and mild right neural foraminal narrowing.    C3-C4: Posterior disc osteophyte complex with uncovertebral spurring and severe facet arthropathy result in severe spinal canal stenosis and severe right, moderate left neural foraminal narrowing.    C4-C5: Posterior disc osteophyte complex with uncovertebral spurring and moderate facet arthropathy result in  moderate spinal canal stenosis and severe left, moderate right neural foraminal narrowing.    C5-C6: Postoperative changes.  Mild spinal canal stenosis and mild bilateral neural foraminal narrowing.    C6-C7: Postoperative changes.  Moderate spinal canal stenosis and moderate right, mild left neural foraminal narrowing.    C7-T1: Moderate facet arthropathy results in moderate bilateral neural foraminal narrowing.    Paraspinal muscles & soft tissues: Unremarkable.                                       XR Cervical Spine AP Lateral w/ Flex Ext and Swimmers Flex Ext Without Odontoid (Final result)  Result time 08/21/23 15:41:39      Final result by Trell Church MD (08/21/23 15:41:39)                   Impression:      No fracture.  Additional findings above.      Electronically signed by: Trell Church MD  Date:    08/21/2023  Time:    15:41               Narrative:    EXAMINATION:  XR CERVICAL SPINE AP LATERAL W/ FLEX EXT AND SWIMMERS FLEX EXT WITHOU    CLINICAL HISTORY:  stenosis and new LLE weakness, sees nsx;    TECHNIQUE:  Seven views cervical spine.    COMPARISON:  Scoliosis exam 07/20/2023    FINDINGS:  Postop anterior interbody fusion C5-C6 and C6-C7 levels, stable, no hardware failure.  Prominent DJD anterior C1-C2 with anterior juxta-articular, facet joint calcification, mild moderate degenerative disc spondylosis C3 and particularly C4 with primary anterior subluxation C3 on C4 and C5 on C4.  Prompt facet joint arthropathy C3 and C4 disc levels.  Minimal mild remote anterior wedge deformity few included mid dorsal spine vertebral bodies with mild anterior spondylosis change.  Airway neck soft tissues normal.  Straightening usual lordotic curve, neck muscle spasm.                                       X-ray Lumbar Spine Flexion And Extension Only (Final result)  Result time 08/21/23 14:29:36      Final result by Kenny Rodrigez MD (08/21/23 14:29:36)                   Impression:       Allowing for the above limitations, flexion and extension lateral views of the lumbar spine demonstrate no subluxation.      Electronically signed by: Kenny Rodrigez  Date:    08/21/2023  Time:    14:29               Narrative:    EXAMINATION:  XR LUMBAR SPINE FLEXION AND EXTENSION ONLY    CLINICAL HISTORY:  stenosis new LLE weakness ordered by nsx;    TECHNIQUE:  Lateral radiographs of the lumbar spine are obtained while the patient performed flexion and then while the patient performed extension    COMPARISON:  Lumbar spine radiographs 07/10/2023.    The mid correlation is also made with lumbar spine MRI of 08/02/2023 and with the lumbar spine CT of 07/21/2023.    FINDINGS:  Body habitus limits visualization of osseous detail.    The L1-S1 posterior fusion construct demonstrates no adverse interval radiographic change.    Pre-existing multilevel degenerative changes in the lumbar spine and partially visualized lower thoracic spine.  Pre-existing mild anterior vertebral body wedging in the region of the thoracolumbar junction, again most pronounced at T12 with some anterior vertebral body wedging of L1, T11, T10, and T9.  The lower thoracic vertebra are specially poorly visualized, however.    The range of motion demonstrated in flexion and extension is markedly limited, but no will vertebral subluxation is demonstrated.    Partially visualized bilateral THAs.    Sacrococcygeal spine not fully visualized.                                       X-Ray Hip 2 or 3 views Left (with Pelvis when performed) (Final result)  Result time 08/21/23 14:20:05      Final result by Kenny Rodrigez MD (08/21/23 14:20:05)                   Impression:      Allowing for the above limitations, no acute radiographic abnormality is demonstrated in the left total hip prosthesis or visualized portion of the osseous pelvic ring.    Partially visualized right total hip prosthesis.      Electronically signed by: Kenny  Tavoshannan  Date:    08/21/2023  Time:    14:20               Narrative:    EXAMINATION:  XR HIP WITH PELVIS WHEN PERFORMED, 2 OR 3 VIEWS LEFT    CLINICAL HISTORY:  mult falls;    TECHNIQUE:  AP view of the pelvis and frog leg lateral view of the left hip were performed.    COMPARISON:  Right hip radiograph 05/11/2015    FINDINGS:  Partially visualized multilevel lumbar fusion construct.    Sacrum partially obscured by superimposed viscera and pubic bones.    The left JOO and visualized portion of the right JOO demonstrate no acute radiographic abnormality.  The distal portion of the right JOO femoral stem is not fully included in these images.                                      ED Course as of 08/21/23 1838   Mon Aug 21, 2023   1256 I have page Neurosurgery twice and have not yet heard back.  I attempted to call both numbers listed on the on-call fighter and both were incorrectly listed.  I sent a secure chat the on-call PA.  Awaiting response. [AC]   1320 I spoke to neurosurgical PA.  She will see the patient.  Awaiting imaging. [AC]   1515 Sed Rate: 12 [AC]   1516 INR: 0.9 [AC]   1516 Hemoglobin(!): 11.6 [AC]   1516 Sodium: 137 [AC]   1516 Potassium: 4.1 [AC]   1516 Creatinine: 0.6 [AC]   1516 X-Ray Hip 2 or 3 views Left (with Pelvis when performed)  No acute abnormality [AC]   1516 X-ray Lumbar Spine Flexion And Extension Only  No subluxation [AC]   1517 MRI Thoracic Spine Without Contrast  Associated spinal cord edema or myelomalacia noted.  T10-T11. [AC]   1520 Pending PVR [AC]   1541 Urinary retention was noted, 660 on bladder scan.  Prakash placed. [AC]   1605 MRI Cervical Spine Without Contrast  Severe canal stenosis at C3/C4 [AC]   1605 Neurosurgery at bedside [AC]      ED Course User Index  [AC] Horace Hicks, DO         Relevant imaging interpreted by myself  Reviewed MRI findings with critical stenosis as above    Impression and Plan    78 y.o. female with findings of spinal stenosis with cord edema  changes based on the work up in the emergency department as above.  She had urinary retention resulting in meza placement.    Important lab/imaging findings include: as above noted    I consulted with:  Neurosurgery    The neurosurgery service will admit to their team for further care, NPO after midnight for possible operative intervention.    All tests, treatment options and disposition options were discussed with the patient.  The decision was made to admit the patient to the hospital.    The patient was admitted in stable condition and all further questions/concerns by patient and/or family were addressed.    Critical Care:  Date: 08/21/2023  Performed by: Dr. Horace Hicks  Authorized by: Dr. Horace Hicks   Total critical care time (exclusive of procedural time) : 35 minutes  Upon my evaluation, this patient had a high probability of imminent or life-threatening deterioration due to [ critical spinal stenosis with cord edema ] which required my direct attention, intervention and personal management.  Critical care was necessary to treat or prevent imminent or life-threatening deterioration.  This may include review of laboratory data, radiology results, discussion with consultants and family, and monitoring for potential decompensations. Interventions were performed as documented.         Final diagnoses:  [R33.9] Urinary retention  [M48.00] Spinal stenosis, unspecified spinal region  [G95.9] Acute myelopathy        ED Disposition Condition    Admit Stable                  Horace Hicks,   08/21/23 3628

## 2023-08-21 NOTE — Clinical Note
Diagnosis: Thoracic stenosis [865470]   Admitting Provider:: JOCELYNE CASTREJON [[id number]]   Future Attending Provider: JOCELYNE CASTREJON [[id number]]   Reason for IP Medical Treatment  (Clinical interventions that can only be accomplished in the IP setting? ) :: thoracic stenosis requiring surgical decompression   I certify that Inpatient services for greater than or equal to 2 midnights are medically necessary:: Yes   Plans for Post-Acute care--if anticipated (pick the single best option):: A. No post acute care anticipated at this time   Special Needs:: No Special Needs [1]

## 2023-08-21 NOTE — SUBJECTIVE & OBJECTIVE
(Not in a hospital admission)      Review of patient's allergies indicates:  No Known Allergies    Past Medical History:   Diagnosis Date    Arthritis     osteoarthritis    Hypertension     Abreu-Stickler syndrome      Past Surgical History:   Procedure Laterality Date    acdf      2 level    BACK SURGERY      laminectomy    BACK SURGERY      lumbar fusion L1-S1    CARDIAC CATHETERIZATION  2016    Normal Dr Wright; see media note; normal     SECTION      x2    EYE SURGERY Bilateral     laser   -   Nano's     JOINT REPLACEMENT Left 2013    JOINT REPLACEMENT Right 5/11/15    TONSILLECTOMY       Family History       Problem Relation (Age of Onset)    Arthritis Mother    Cancer Sister    Crohn's disease Son    Diabetes Mother    Heart disease Mother, Brother, Sister    No Known Problems Son    Stickler syndrome Son          Tobacco Use    Smoking status: Never     Passive exposure: Never    Smokeless tobacco: Never   Substance and Sexual Activity    Alcohol use: Yes     Comment: occasional    Drug use: Not on file    Sexual activity: Not on file     Review of Systems  Objective:     Weight: 68.5 kg (151 lb)  Body mass index is 25.92 kg/m².  Vital Signs (Most Recent):  Temp: 97.8 °F (36.6 °C) (23 1550)  Pulse: 88 (23 1550)  Resp: 16 (23 1550)  BP: (!) 166/92 (23 1550)  SpO2: 95 % (23 1550) Vital Signs (24h Range):  Temp:  [97.7 °F (36.5 °C)-98.3 °F (36.8 °C)] 97.8 °F (36.6 °C)  Pulse:  [75-88] 88  Resp:  [16-18] 16  SpO2:  [95 %-100 %] 95 %  BP: (147-166)/(65-92) 166/92                              Urethral Catheter 23 1530 Non-latex 16 Fr. (Active)          Physical Exam       Neurosurgery Physical Exam    General: well developed, well nourished, no distress.   Head: normocephalic, atraumatic  Neurologic: Alert and oriented. Thought content appropriate.  GCS: Motor: 6/Verbal: 5/Eyes: 4 GCS Total: 15  Mental Status: Awake, Alert, Oriented x 4  Language:  No aphasia  Speech: No dysarthria  Cranial nerves: face symmetric, tongue midline, CN II-XII grossly intact, EOMI.   Pulmonary: normal respirations, no signs of respiratory distress  Abdomen: soft, non-distended, not tender to palpation  Sensory: intact to light touch throughout  Motor Strength: Involuntary spasms to BLE. No abnormal movements seen.     Strength  Deltoids Triceps Biceps Wrist Extension Wrist Flexion Hand    Upper: R 5/5 5/5 5/5 5/5 5/5 5/5    L 5/5 5/5 5/5 5/5 5/5 5/5     Iliopsoas Quadriceps Knee  Flexion Tibialis  anterior Gastro- cnemius EHL   Lower: R 3/5 4/5 4/5 4+/5 4+/5 4+/5    L 2/5 2/5 2/5 0/5 0/5 2/5     Burnett: absent  Clonus: absent  Babinski: absent  Vascular: No LE edema.   Skin: Skin is warm, dry and intact.    Prior ACDF and posterior lumbar fusion incisions well healed. No TTP.    Rectal: decreased rectal tone, sensation intact      Significant Labs:  Recent Labs   Lab 08/21/23  1116   GLU 88      K 4.1      CO2 24   BUN 12   CREATININE 0.6   CALCIUM 9.2     Recent Labs   Lab 08/21/23  1116   WBC 6.64   HGB 11.6*   HCT 35.1*        Recent Labs   Lab 08/21/23  1206   INR 0.9   APTT <21.0     Microbiology Results (last 7 days)       ** No results found for the last 168 hours. **          Recent Lab Results         08/21/23  1538   08/21/23  1206   08/21/23  1116        Albumin     4.0       Alkaline Phosphatase     42       ALT     15       Anion Gap     8       Appearance, UA Clear           aPTT   <21.0  Comment: Refer to local heparin nomogram for intensity/dose specific   therapeutic   range.           AST     20       Baso #     0.04       Basophil %     0.6       Bilirubin (UA) Negative           BILIRUBIN TOTAL     0.6  Comment: For infants and newborns, interpretation of results should be based  on gestational age, weight and in agreement with clinical  observations.    Premature Infant recommended reference ranges:  Up to 24 hours.............<8.0  mg/dL  Up to 48 hours............<12.0 mg/dL  3-5 days..................<15.0 mg/dL  6-29 days.................<15.0 mg/dL         BUN     12       Calcium     9.2       Chloride     105       CO2     24       Color, UA Yellow           Creatinine     0.6       CRP     <0.3       Differential Method     Automated       eGFR     >60.0       Eos #     0.0       Eosinophil %     0.6       Glucose     88       Glucose, UA Negative           Gran # (ANC)     4.9       Gran %     73.2       Group & Rh   A POS         Hematocrit     35.1       Hemoglobin     11.6       Hepatitis C Ab     Reactive  Comment: Presumptive evidence of antibodies to HCV; see results   of the supplemental testing HCV RNA Quantitative by   PCR ([id number]).         HIV 1/2 Ag/Ab     Non-reactive       Immature Grans (Abs)     0.02  Comment: Mild elevation in immature granulocytes is non specific and   can be seen in a variety of conditions including stress response,   acute inflammation, trauma and pregnancy. Correlation with other   laboratory and clinical findings is essential.         Immature Granulocytes     0.3       INDIRECT JANI   NEG         INR   0.9  Comment: Coumadin Therapy:  2.0 - 3.0 for INR for all indicators except mechanical heart valves  and antiphospholipid syndromes which should use 2.5 - 3.5.           Ketones, UA Trace           Leukocytes, UA Negative           Lymph #     1.2       Lymph %     18.5       MCH     29.7       MCHC     33.0       MCV     90       Mono #     0.5       Mono %     6.8       MPV     9.5       NITRITE UA Negative           nRBC     0       Occult Blood UA Negative           pH, UA 7.0           Platelets     241       Potassium     4.1       PROTEIN TOTAL     6.8       Protein, UA Negative  Comment: Recommend a 24 hour urine protein or a urine   protein/creatinine ratio if globulin induced proteinuria is  clinically suspected.             Protime   10.2         RBC     3.91       RDW     13.1        Sed Rate     12       Sodium     137       Specific [place], UA 1.010           Specimen Outdate   08/24/2023 23:59         Specimen UA Urine, Catheterized           WBC     6.64             All pertinent labs from the last 24 hours have been reviewed.    Significant Diagnostics:  I have reviewed all pertinent imaging results/findings within the past 24 hours.

## 2023-08-21 NOTE — CONSULTS
[facility] - Emergency Dept  Neurosurgery  Consult Note    Inpatient consult to Neurosurgery  Consult performed by: Rashida Swain PA-C  Consult ordered by: Horace Hicks DO        Subjective:     Chief Complaint/Reason for Admission: LE weakness    History of Present Illness: Emilia Pablo is a 78 y.o. female with PMH of osteoporosis- on Prolia, HTN, and Abreu-Stickler Syndrome. She has a surgical hx of  cervical fusion and  lumbosacral fusion - EJ - did well from surgery, chronic foot drop on left since surgery. She was evaluated by Dr. Aminta FARRIS one month ago for weakness, gait imbalance and LLE weakness and the patient was referred to Dr. Chand for further evaluation. She was not able to complete the outpatient workup due to new neuro deficits. Reports a fall trying to transfer from the chair to the toilet. She denies hitting her head/back or LOC. Reports since then she noticed worsening weakness RLE>LLE and some bowel incontinence. She feels the urge to have a BM but reports episodes of incontinence. Also notes urinary urgency x 2 weeks. Prakash placed in ED due to retention, bladder scan 660cc without the urge to void. She has nondermatomal patchy numbness in the BLE in the feet and thighs. Denies saddle anesthesia, UE weakness or numbness. NSGY consulted for evaluation.       (Not in a hospital admission)      Review of patient's allergies indicates:  No Known Allergies    Past Medical History:   Diagnosis Date    Arthritis     osteoarthritis    Hypertension     Abreu-Stickler syndrome      Past Surgical History:   Procedure Laterality Date    acdf      2 level    BACK SURGERY      laminectomy    BACK SURGERY      lumbar fusion L1-S1    CARDIAC CATHETERIZATION  2016    Normal Dr Wright; see media note; normal     SECTION      x2    EYE SURGERY Bilateral     laser   -   Nano's     JOINT REPLACEMENT Left 2013    JOINT REPLACEMENT Right 5/11/15     TONSILLECTOMY       Family History       Problem Relation (Age of Onset)    Arthritis Mother    Cancer Sister    Crohn's disease Son    Diabetes Mother    Heart disease Mother, Brother, Sister    No Known Problems Son    Stickler syndrome Son          Tobacco Use    Smoking status: Never     Passive exposure: Never    Smokeless tobacco: Never   Substance and Sexual Activity    Alcohol use: Yes     Comment: occasional    Drug use: Not on file    Sexual activity: Not on file     Review of Systems  Objective:     Weight: 68.5 kg (151 lb)  Body mass index is 25.92 kg/m².  Vital Signs (Most Recent):  Temp: 97.8 °F (36.6 °C) (08/21/23 1550)  Pulse: 88 (08/21/23 1550)  Resp: 16 (08/21/23 1550)  BP: (!) 166/92 (08/21/23 1550)  SpO2: 95 % (08/21/23 1550) Vital Signs (24h Range):  Temp:  [97.7 °F (36.5 °C)-98.3 °F (36.8 °C)] 97.8 °F (36.6 °C)  Pulse:  [75-88] 88  Resp:  [16-18] 16  SpO2:  [95 %-100 %] 95 %  BP: (147-166)/(65-92) 166/92                              Urethral Catheter 08/21/23 1530 Non-latex 16 Fr. (Active)          Physical Exam       Neurosurgery Physical Exam    General: well developed, well nourished, no distress.   Head: normocephalic, atraumatic  Neurologic: Alert and oriented. Thought content appropriate.  GCS: Motor: 6/Verbal: 5/Eyes: 4 GCS Total: 15  Mental Status: Awake, Alert, Oriented x 4  Language: No aphasia  Speech: No dysarthria  Cranial nerves: face symmetric, tongue midline, CN II-XII grossly intact, EOMI.   Pulmonary: normal respirations, no signs of respiratory distress  Abdomen: soft, non-distended, not tender to palpation  Sensory: intact to light touch throughout  Motor Strength: Involuntary spasms to BLE. No abnormal movements seen.     Strength  Deltoids Triceps Biceps Wrist Extension Wrist Flexion Hand    Upper: R 5/5 5/5 5/5 5/5 5/5 5/5    L 5/5 5/5 5/5 5/5 5/5 5/5     Iliopsoas Quadriceps Knee  Flexion Tibialis  anterior Gastro- cnemius EHL   Lower: R 3/5 4/5 4/5 4+/5 4+/5  4+/5    L 2/5 2/5 2/5 0/5 0/5 2/5     Burnett: absent  Clonus: absent  Babinski: absent  Vascular: No LE edema.   Skin: Skin is warm, dry and intact.    Prior ACDF and posterior lumbar fusion incisions well healed. No TTP.    Rectal: decreased rectal tone, sensation intact      Significant Labs:  Recent Labs   Lab 08/21/23  1116   GLU 88      K 4.1      CO2 24   BUN 12   CREATININE 0.6   CALCIUM 9.2     Recent Labs   Lab 08/21/23  1116   WBC 6.64   HGB 11.6*   HCT 35.1*        Recent Labs   Lab 08/21/23  1206   INR 0.9   APTT <21.0     Microbiology Results (last 7 days)       ** No results found for the last 168 hours. **          Recent Lab Results         08/21/23  1538   08/21/23  1206   08/21/23  1116        Albumin     4.0       Alkaline Phosphatase     42       ALT     15       Anion Gap     8       Appearance, UA Clear           aPTT   <21.0  Comment: Refer to local heparin nomogram for intensity/dose specific   therapeutic   range.           AST     20       Baso #     0.04       Basophil %     0.6       Bilirubin (UA) Negative           BILIRUBIN TOTAL     0.6  Comment: For infants and newborns, interpretation of results should be based  on gestational age, weight and in agreement with clinical  observations.    Premature Infant recommended reference ranges:  Up to 24 hours.............<8.0 mg/dL  Up to 48 hours............<12.0 mg/dL  3-5 days..................<15.0 mg/dL  6-29 days.................<15.0 mg/dL         BUN     12       Calcium     9.2       Chloride     105       CO2     24       Color, UA Yellow           Creatinine     0.6       CRP     <0.3       Differential Method     Automated       eGFR     >60.0       Eos #     0.0       Eosinophil %     0.6       Glucose     88       Glucose, UA Negative           Gran # (ANC)     4.9       Gran %     73.2       Group & Rh   A POS         Hematocrit     35.1       Hemoglobin     11.6       Hepatitis C Ab     Reactive  Comment:  Presumptive evidence of antibodies to HCV; see results   of the supplemental testing HCV RNA Quantitative by   PCR ([id number]).         HIV 1/2 Ag/Ab     Non-reactive       Immature Grans (Abs)     0.02  Comment: Mild elevation in immature granulocytes is non specific and   can be seen in a variety of conditions including stress response,   acute inflammation, trauma and pregnancy. Correlation with other   laboratory and clinical findings is essential.         Immature Granulocytes     0.3       INDIRECT JANI   NEG         INR   0.9  Comment: Coumadin Therapy:  2.0 - 3.0 for INR for all indicators except mechanical heart valves  and antiphospholipid syndromes which should use 2.5 - 3.5.           Ketones, UA Trace           Leukocytes, UA Negative           Lymph #     1.2       Lymph %     18.5       MCH     29.7       MCHC     33.0       MCV     90       Mono #     0.5       Mono %     6.8       MPV     9.5       NITRITE UA Negative           nRBC     0       Occult Blood UA Negative           pH, UA 7.0           Platelets     241       Potassium     4.1       PROTEIN TOTAL     6.8       Protein, UA Negative  Comment: Recommend a 24 hour urine protein or a urine   protein/creatinine ratio if globulin induced proteinuria is  clinically suspected.             Protime   10.2         RBC     3.91       RDW     13.1       Sed Rate     12       Sodium     137       Specific [place], UA 1.010           Specimen Outdate   08/24/2023 23:59         Specimen UA Urine, Catheterized           WBC     6.64             All pertinent labs from the last 24 hours have been reviewed.    Significant Diagnostics:  I have reviewed all pertinent imaging results/findings within the past 24 hours.    Assessment/Plan:     * Thoracic spinal stenosis  Emilia Pablo is a 78 y.o. female with PMH of osteoporosis- on Prolia, HTN, Abreu-Stickler Syndrome, worsening BLE weakness x 3 months who presents for evaluation of acute myelopathy.      - No acute neurosurgical intervention at this time, pending completed workup. Sx present since Friday.   - MRI cervical and thoracic spine shows moderate canal stenosis C4-5, T10-11 2/2 calcified disc herniation with cord signal change, multilevel foraminal stenosis.   - MRI lumbar spine pending. Prior MRI L spine 8/2/2023 limited evaluation 2/2 hardware artifact, but without severe canal stenosis.   - Prakash placed for urinary retention  - Further recommendations to follow completed workup.   - NPO at midnight, tentatively booked for OR tomorrow for T10-11 discectomy and extension/revision of prior fusion to T8-pelvis. Preop labs ordered.  - Discussed with Dr. Chand.        Thank you for your consult. I will follow-up with patient. Please contact us if you have any additional questions.    Rashida Swain PA-C  Neurosurgery  [facility] - Emergency Dept

## 2023-08-22 ENCOUNTER — ANESTHESIA (OUTPATIENT)
Dept: SURGERY | Facility: HOSPITAL | Age: 78
DRG: 460 | End: 2023-08-22
Payer: MEDICARE

## 2023-08-22 PROBLEM — R33.9 URINARY RETENTION: Status: ACTIVE | Noted: 2023-08-22

## 2023-08-22 LAB
ALBUMIN SERPL BCP-MCNC: 2.8 G/DL (ref 3.5–5.2)
ALP SERPL-CCNC: 32 U/L (ref 55–135)
ALT SERPL W/O P-5'-P-CCNC: 15 U/L (ref 10–44)
ANION GAP SERPL CALC-SCNC: 6 MMOL/L (ref 8–16)
ANION GAP SERPL CALC-SCNC: 6 MMOL/L (ref 8–16)
APTT PPP: 23.1 SEC (ref 21–32)
AST SERPL-CCNC: 19 U/L (ref 10–40)
BASOPHILS # BLD AUTO: 0.02 K/UL (ref 0–0.2)
BASOPHILS # BLD AUTO: 0.03 K/UL (ref 0–0.2)
BASOPHILS NFR BLD: 0.2 % (ref 0–1.9)
BASOPHILS NFR BLD: 0.5 % (ref 0–1.9)
BILIRUB SERPL-MCNC: 0.4 MG/DL (ref 0.1–1)
BUN SERPL-MCNC: 11 MG/DL (ref 8–23)
BUN SERPL-MCNC: 13 MG/DL (ref 8–23)
CALCIUM SERPL-MCNC: 7 MG/DL (ref 8.7–10.5)
CALCIUM SERPL-MCNC: 8.7 MG/DL (ref 8.7–10.5)
CHLORIDE SERPL-SCNC: 109 MMOL/L (ref 95–110)
CHLORIDE SERPL-SCNC: 112 MMOL/L (ref 95–110)
CO2 SERPL-SCNC: 19 MMOL/L (ref 23–29)
CO2 SERPL-SCNC: 24 MMOL/L (ref 23–29)
CREAT SERPL-MCNC: 0.6 MG/DL (ref 0.5–1.4)
CREAT SERPL-MCNC: 0.6 MG/DL (ref 0.5–1.4)
DIFFERENTIAL METHOD: ABNORMAL
DIFFERENTIAL METHOD: ABNORMAL
EOSINOPHIL # BLD AUTO: 0 K/UL (ref 0–0.5)
EOSINOPHIL # BLD AUTO: 0.1 K/UL (ref 0–0.5)
EOSINOPHIL NFR BLD: 0 % (ref 0–8)
EOSINOPHIL NFR BLD: 1.3 % (ref 0–8)
ERYTHROCYTE [DISTWIDTH] IN BLOOD BY AUTOMATED COUNT: 12.9 % (ref 11.5–14.5)
ERYTHROCYTE [DISTWIDTH] IN BLOOD BY AUTOMATED COUNT: 13.1 % (ref 11.5–14.5)
EST. GFR  (NO RACE VARIABLE): >60 ML/MIN/1.73 M^2
EST. GFR  (NO RACE VARIABLE): >60 ML/MIN/1.73 M^2
FIBRINOGEN PPP-MCNC: 238 MG/DL (ref 182–400)
GLUCOSE SERPL-MCNC: 118 MG/DL (ref 70–110)
GLUCOSE SERPL-MCNC: 76 MG/DL (ref 70–110)
GLUCOSE SERPL-MCNC: 84 MG/DL (ref 70–110)
GLUCOSE SERPL-MCNC: 89 MG/DL (ref 70–110)
HCO3 UR-SCNC: 18.7 MMOL/L (ref 24–28)
HCO3 UR-SCNC: 23.1 MMOL/L (ref 24–28)
HCT VFR BLD AUTO: 26.8 % (ref 37–48.5)
HCT VFR BLD AUTO: 32.4 % (ref 37–48.5)
HCT VFR BLD CALC: 25 %PCV (ref 36–54)
HCT VFR BLD CALC: 27 %PCV (ref 36–54)
HCV RNA SERPL QL NAA+PROBE: NOT DETECTED
HCV RNA SPEC NAA+PROBE-ACNC: <12 IU/ML
HGB BLD-MCNC: 11 G/DL (ref 12–16)
HGB BLD-MCNC: 9 G/DL (ref 12–16)
IMM GRANULOCYTES # BLD AUTO: 0.01 K/UL (ref 0–0.04)
IMM GRANULOCYTES # BLD AUTO: 0.08 K/UL (ref 0–0.04)
IMM GRANULOCYTES NFR BLD AUTO: 0.2 % (ref 0–0.5)
IMM GRANULOCYTES NFR BLD AUTO: 0.8 % (ref 0–0.5)
INR PPP: 1.1 (ref 0.8–1.2)
LACTATE SERPL-SCNC: 0.6 MMOL/L (ref 0.5–2.2)
LYMPHOCYTES # BLD AUTO: 0.5 K/UL (ref 1–4.8)
LYMPHOCYTES # BLD AUTO: 1.6 K/UL (ref 1–4.8)
LYMPHOCYTES NFR BLD: 28.5 % (ref 18–48)
LYMPHOCYTES NFR BLD: 5 % (ref 18–48)
MAGNESIUM SERPL-MCNC: 2 MG/DL (ref 1.6–2.6)
MCH RBC QN AUTO: 30 PG (ref 27–31)
MCH RBC QN AUTO: 30.1 PG (ref 27–31)
MCHC RBC AUTO-ENTMCNC: 33.6 G/DL (ref 32–36)
MCHC RBC AUTO-ENTMCNC: 34 G/DL (ref 32–36)
MCV RBC AUTO: 89 FL (ref 82–98)
MCV RBC AUTO: 89 FL (ref 82–98)
MONOCYTES # BLD AUTO: 0.1 K/UL (ref 0.3–1)
MONOCYTES # BLD AUTO: 0.5 K/UL (ref 0.3–1)
MONOCYTES NFR BLD: 1.3 % (ref 4–15)
MONOCYTES NFR BLD: 9 % (ref 4–15)
NEUTROPHILS # BLD AUTO: 3.4 K/UL (ref 1.8–7.7)
NEUTROPHILS # BLD AUTO: 9.2 K/UL (ref 1.8–7.7)
NEUTROPHILS NFR BLD: 60.5 % (ref 38–73)
NEUTROPHILS NFR BLD: 92.7 % (ref 38–73)
NRBC BLD-RTO: 0 /100 WBC
NRBC BLD-RTO: 0 /100 WBC
PCO2 BLDA: 33 MMHG (ref 35–45)
PCO2 BLDA: 40.8 MMHG (ref 35–45)
PH SMN: 7.36 [PH] (ref 7.35–7.45)
PH SMN: 7.36 [PH] (ref 7.35–7.45)
PHOSPHATE SERPL-MCNC: 2.5 MG/DL (ref 2.7–4.5)
PLATELET # BLD AUTO: 177 K/UL (ref 150–450)
PLATELET # BLD AUTO: 223 K/UL (ref 150–450)
PMV BLD AUTO: 9.3 FL (ref 9.2–12.9)
PMV BLD AUTO: 9.4 FL (ref 9.2–12.9)
PO2 BLDA: 278 MMHG (ref 80–100)
PO2 BLDA: 611 MMHG (ref 80–100)
POC BE: -2 MMOL/L
POC BE: -7 MMOL/L
POC IONIZED CALCIUM: 1.07 MMOL/L (ref 1.06–1.42)
POC IONIZED CALCIUM: 1.15 MMOL/L (ref 1.06–1.42)
POC SATURATED O2: 100 % (ref 95–100)
POC SATURATED O2: 100 % (ref 95–100)
POC TCO2: 20 MMOL/L (ref 23–27)
POC TCO2: 24 MMOL/L (ref 23–27)
POTASSIUM BLD-SCNC: 3.5 MMOL/L (ref 3.5–5.1)
POTASSIUM BLD-SCNC: 3.8 MMOL/L (ref 3.5–5.1)
POTASSIUM SERPL-SCNC: 4 MMOL/L (ref 3.5–5.1)
POTASSIUM SERPL-SCNC: 4.4 MMOL/L (ref 3.5–5.1)
PROT SERPL-MCNC: 4.8 G/DL (ref 6–8.4)
PROTHROMBIN TIME: 11.3 SEC (ref 9–12.5)
RBC # BLD AUTO: 3 M/UL (ref 4–5.4)
RBC # BLD AUTO: 3.65 M/UL (ref 4–5.4)
SAMPLE: ABNORMAL
SAMPLE: ABNORMAL
SODIUM BLD-SCNC: 138 MMOL/L (ref 136–145)
SODIUM BLD-SCNC: 141 MMOL/L (ref 136–145)
SODIUM SERPL-SCNC: 137 MMOL/L (ref 136–145)
SODIUM SERPL-SCNC: 139 MMOL/L (ref 136–145)
WBC # BLD AUTO: 5.55 K/UL (ref 3.9–12.7)
WBC # BLD AUTO: 9.96 K/UL (ref 3.9–12.7)

## 2023-08-22 PROCEDURE — 25000003 PHARM REV CODE 250: Performed by: PHYSICIAN ASSISTANT

## 2023-08-22 PROCEDURE — 80048 BASIC METABOLIC PNL TOTAL CA: CPT | Performed by: PHYSICIAN ASSISTANT

## 2023-08-22 PROCEDURE — 85610 PROTHROMBIN TIME: CPT

## 2023-08-22 PROCEDURE — 25000003 PHARM REV CODE 250

## 2023-08-22 PROCEDURE — D9220A PRA ANESTHESIA: ICD-10-PCS | Mod: ANES,,, | Performed by: ANESTHESIOLOGY

## 2023-08-22 PROCEDURE — 27201037 HC PRESSURE MONITORING SET UP

## 2023-08-22 PROCEDURE — 22610 PR ARTHRODESIS, POST/POSTLAT, SNGL INTERSPACE, THORACIC: ICD-10-PCS | Mod: 51,,, | Performed by: NEUROLOGICAL SURGERY

## 2023-08-22 PROCEDURE — D9220A PRA ANESTHESIA: ICD-10-PCS | Mod: CRNA,,, | Performed by: NURSE ANESTHETIST, CERTIFIED REGISTERED

## 2023-08-22 PROCEDURE — 22843 INSERT SPINE FIXATION DEVICE: CPT | Mod: ,,, | Performed by: NEUROLOGICAL SURGERY

## 2023-08-22 PROCEDURE — 36000710: Performed by: NEUROLOGICAL SURGERY

## 2023-08-22 PROCEDURE — 83735 ASSAY OF MAGNESIUM: CPT

## 2023-08-22 PROCEDURE — 99232 SBSQ HOSP IP/OBS MODERATE 35: CPT | Mod: ,,, | Performed by: PHYSICIAN ASSISTANT

## 2023-08-22 PROCEDURE — 20930 PR ALLOGRAFT FOR SPINE SURGERY ONLY MORSELIZED: ICD-10-PCS | Mod: ,,, | Performed by: NEUROLOGICAL SURGERY

## 2023-08-22 PROCEDURE — 84100 ASSAY OF PHOSPHORUS: CPT

## 2023-08-22 PROCEDURE — 25000003 PHARM REV CODE 250: Performed by: STUDENT IN AN ORGANIZED HEALTH CARE EDUCATION/TRAINING PROGRAM

## 2023-08-22 PROCEDURE — 37000008 HC ANESTHESIA 1ST 15 MINUTES: Performed by: NEUROLOGICAL SURGERY

## 2023-08-22 PROCEDURE — 63600175 PHARM REV CODE 636 W HCPCS

## 2023-08-22 PROCEDURE — 27201423 OPTIME MED/SURG SUP & DEVICES STERILE SUPPLY: Performed by: NEUROLOGICAL SURGERY

## 2023-08-22 PROCEDURE — 25000003 PHARM REV CODE 250: Performed by: NURSE ANESTHETIST, CERTIFIED REGISTERED

## 2023-08-22 PROCEDURE — 25000003 PHARM REV CODE 250: Performed by: NEUROLOGICAL SURGERY

## 2023-08-22 PROCEDURE — 22843 PR POSTERIOR SEGMENTAL INSTRUMENTATION 7-12 VRT SEG: ICD-10-PCS | Mod: ,,, | Performed by: NEUROLOGICAL SURGERY

## 2023-08-22 PROCEDURE — 99291 CRITICAL CARE FIRST HOUR: CPT | Mod: ,,,

## 2023-08-22 PROCEDURE — 63600175 PHARM REV CODE 636 W HCPCS: Performed by: NEUROLOGICAL SURGERY

## 2023-08-22 PROCEDURE — 20936 PR AUTOGRAFT SPINE SURGERY LOCAL FROM SAME INCISION: ICD-10-PCS | Mod: ,,, | Performed by: NEUROLOGICAL SURGERY

## 2023-08-22 PROCEDURE — D9220A PRA ANESTHESIA: Mod: ANES,,, | Performed by: ANESTHESIOLOGY

## 2023-08-22 PROCEDURE — C1734 ORTH/DEVIC/DRUG BN/BN,TIS/BN: HCPCS | Performed by: NEUROLOGICAL SURGERY

## 2023-08-22 PROCEDURE — 15734 MUSCLE-SKIN GRAFT TRUNK: CPT | Mod: 51,,, | Performed by: NEUROLOGICAL SURGERY

## 2023-08-22 PROCEDURE — 22212 PR OSTEOTOMY THOR SP,POST,1 LVL: ICD-10-PCS | Mod: 22,,, | Performed by: NEUROLOGICAL SURGERY

## 2023-08-22 PROCEDURE — 20600001 HC STEP DOWN PRIVATE ROOM

## 2023-08-22 PROCEDURE — 22216 PR OSTEOTOMY,POST,EA ADDN SGMT: ICD-10-PCS | Mod: ,,, | Performed by: NEUROLOGICAL SURGERY

## 2023-08-22 PROCEDURE — 99291 PR CRITICAL CARE, E/M 30-74 MINUTES: ICD-10-PCS | Mod: ,,,

## 2023-08-22 PROCEDURE — C1729 CATH, DRAINAGE: HCPCS | Performed by: NEUROLOGICAL SURGERY

## 2023-08-22 PROCEDURE — 20930 SP BONE ALGRFT MORSEL ADD-ON: CPT | Mod: ,,, | Performed by: NEUROLOGICAL SURGERY

## 2023-08-22 PROCEDURE — 20936 SP BONE AGRFT LOCAL ADD-ON: CPT | Mod: ,,, | Performed by: NEUROLOGICAL SURGERY

## 2023-08-22 PROCEDURE — 36620 ARTERIAL: ICD-10-PCS | Mod: 59,GC,, | Performed by: ANESTHESIOLOGY

## 2023-08-22 PROCEDURE — 22212 INCIS 1 VERTEBRAL SEG THORAC: CPT | Mod: 22,,, | Performed by: NEUROLOGICAL SURGERY

## 2023-08-22 PROCEDURE — 99232 PR SUBSEQUENT HOSPITAL CARE,LEVL II: ICD-10-PCS | Mod: ,,, | Performed by: PHYSICIAN ASSISTANT

## 2023-08-22 PROCEDURE — D9220A PRA ANESTHESIA: Mod: CRNA,,, | Performed by: NURSE ANESTHETIST, CERTIFIED REGISTERED

## 2023-08-22 PROCEDURE — 22610 ARTHRD PST TQ 1NTRSPC THRC: CPT | Mod: 51,,, | Performed by: NEUROLOGICAL SURGERY

## 2023-08-22 PROCEDURE — 36620 INSERTION CATHETER ARTERY: CPT | Mod: 59,GC,, | Performed by: ANESTHESIOLOGY

## 2023-08-22 PROCEDURE — 36415 COLL VENOUS BLD VENIPUNCTURE: CPT | Performed by: PHYSICIAN ASSISTANT

## 2023-08-22 PROCEDURE — 36000711: Performed by: NEUROLOGICAL SURGERY

## 2023-08-22 PROCEDURE — 37000009 HC ANESTHESIA EA ADD 15 MINS: Performed by: NEUROLOGICAL SURGERY

## 2023-08-22 PROCEDURE — 85025 COMPLETE CBC W/AUTO DIFF WBC: CPT | Performed by: PHYSICIAN ASSISTANT

## 2023-08-22 PROCEDURE — 85025 COMPLETE CBC W/AUTO DIFF WBC: CPT | Mod: 91

## 2023-08-22 PROCEDURE — 22614 ARTHRD PST TQ 1NTRSPC EA ADD: CPT | Mod: ,,, | Performed by: NEUROLOGICAL SURGERY

## 2023-08-22 PROCEDURE — 27800903 OPTIME MED/SURG SUP & DEVICES OTHER IMPLANTS: Performed by: NEUROLOGICAL SURGERY

## 2023-08-22 PROCEDURE — C1713 ANCHOR/SCREW BN/BN,TIS/BN: HCPCS | Performed by: NEUROLOGICAL SURGERY

## 2023-08-22 PROCEDURE — 85730 THROMBOPLASTIN TIME PARTIAL: CPT

## 2023-08-22 PROCEDURE — 22614 PR ARTHRODESIS, POST/POSTLAT, SNGL INTERSPACE, EA ADDTL: ICD-10-PCS | Mod: ,,, | Performed by: NEUROLOGICAL SURGERY

## 2023-08-22 PROCEDURE — 15734 PR MUSCLE-SKIN FLAP,TRUNK: ICD-10-PCS | Mod: 51,,, | Performed by: NEUROLOGICAL SURGERY

## 2023-08-22 PROCEDURE — 63600175 PHARM REV CODE 636 W HCPCS: Performed by: NURSE ANESTHETIST, CERTIFIED REGISTERED

## 2023-08-22 PROCEDURE — 85384 FIBRINOGEN ACTIVITY: CPT

## 2023-08-22 PROCEDURE — 20000000 HC ICU ROOM

## 2023-08-22 PROCEDURE — 80053 COMPREHEN METABOLIC PANEL: CPT

## 2023-08-22 PROCEDURE — 22216 INCIS ADDL SPINE SEGMENT: CPT | Mod: ,,, | Performed by: NEUROLOGICAL SURGERY

## 2023-08-22 PROCEDURE — 83605 ASSAY OF LACTIC ACID: CPT

## 2023-08-22 DEVICE — SCREW BONE SPINAL 5.5 5 X 45MM: Type: IMPLANTABLE DEVICE | Site: SPINE THORACIC | Status: FUNCTIONAL

## 2023-08-22 DEVICE — CONNECTOR SPINAL SFX A5 5.5MM: Type: IMPLANTABLE DEVICE | Site: BACK | Status: FUNCTIONAL

## 2023-08-22 DEVICE — SCREW INNER SINGLE SET TITANIU: Type: IMPLANTABLE DEVICE | Site: BACK | Status: FUNCTIONAL

## 2023-08-22 DEVICE — CHIPS CANCELLOUS 30CC: Type: IMPLANTABLE DEVICE | Site: SPINE THORACIC | Status: FUNCTIONAL

## 2023-08-22 DEVICE — KIT MED BONE INFUSE: Type: IMPLANTABLE DEVICE | Site: SPINE THORACIC | Status: FUNCTIONAL

## 2023-08-22 DEVICE — SCREW EXPEDIUM VERSE PA 5X45MM: Type: IMPLANTABLE DEVICE | Site: SPINE THORACIC | Status: FUNCTIONAL

## 2023-08-22 DEVICE — SCREW BONE SPINAL 5.5 6 X 45MM: Type: IMPLANTABLE DEVICE | Site: SPINE THORACIC | Status: FUNCTIONAL

## 2023-08-22 DEVICE — SET SCREW EXPEDIUM VERSE UNITZ: Type: IMPLANTABLE DEVICE | Site: BACK | Status: FUNCTIONAL

## 2023-08-22 DEVICE — ROD SPINE MTRX STR 400X80MM: Type: IMPLANTABLE DEVICE | Site: BACK | Status: FUNCTIONAL

## 2023-08-22 DEVICE — KIT CONFIDENCE W/O NEEDLES: Type: IMPLANTABLE DEVICE | Site: SPINE THORACIC | Status: FUNCTIONAL

## 2023-08-22 DEVICE — SCREW EXPEDIUM VERSE PA 7X45MM: Type: IMPLANTABLE DEVICE | Site: SPINE THORACIC | Status: FUNCTIONAL

## 2023-08-22 DEVICE — CONNECTOR SPINAL SFX A4 TI: Type: IMPLANTABLE DEVICE | Site: BACK | Status: FUNCTIONAL

## 2023-08-22 RX ORDER — DEXAMETHASONE SODIUM PHOSPHATE 4 MG/ML
INJECTION, SOLUTION INTRA-ARTICULAR; INTRALESIONAL; INTRAMUSCULAR; INTRAVENOUS; SOFT TISSUE
Status: DISCONTINUED | OUTPATIENT
Start: 2023-08-22 | End: 2023-08-22

## 2023-08-22 RX ORDER — MAG HYDROX/ALUMINUM HYD/SIMETH 200-200-20
30 SUSPENSION, ORAL (FINAL DOSE FORM) ORAL EVERY 4 HOURS PRN
Status: CANCELLED | OUTPATIENT
Start: 2023-08-22

## 2023-08-22 RX ORDER — LIDOCAINE HYDROCHLORIDE AND EPINEPHRINE 10; 10 MG/ML; UG/ML
INJECTION, SOLUTION INFILTRATION; PERINEURAL
Status: DISCONTINUED | OUTPATIENT
Start: 2023-08-22 | End: 2023-08-22 | Stop reason: HOSPADM

## 2023-08-22 RX ORDER — HYDROMORPHONE HYDROCHLORIDE 1 MG/ML
1 INJECTION, SOLUTION INTRAMUSCULAR; INTRAVENOUS; SUBCUTANEOUS
Status: CANCELLED | OUTPATIENT
Start: 2023-08-22

## 2023-08-22 RX ORDER — KETAMINE HCL IN 0.9 % NACL 50 MG/5 ML
SYRINGE (ML) INTRAVENOUS
Status: DISCONTINUED | OUTPATIENT
Start: 2023-08-22 | End: 2023-08-22

## 2023-08-22 RX ORDER — CEFAZOLIN SODIUM 1 G/3ML
INJECTION, POWDER, FOR SOLUTION INTRAMUSCULAR; INTRAVENOUS
Status: DISCONTINUED | OUTPATIENT
Start: 2023-08-22 | End: 2023-08-22

## 2023-08-22 RX ORDER — MUPIROCIN 20 MG/G
OINTMENT TOPICAL 2 TIMES DAILY
Status: CANCELLED | OUTPATIENT
Start: 2023-08-22 | End: 2023-08-27

## 2023-08-22 RX ORDER — AMOXICILLIN 250 MG
2 CAPSULE ORAL NIGHTLY PRN
Status: CANCELLED | OUTPATIENT
Start: 2023-08-22

## 2023-08-22 RX ORDER — LANOLIN ALCOHOL/MO/W.PET/CERES
800 CREAM (GRAM) TOPICAL
Status: DISCONTINUED | OUTPATIENT
Start: 2023-08-22 | End: 2023-08-28

## 2023-08-22 RX ORDER — SODIUM CHLORIDE, SODIUM LACTATE, POTASSIUM CHLORIDE, CALCIUM CHLORIDE 600; 310; 30; 20 MG/100ML; MG/100ML; MG/100ML; MG/100ML
INJECTION, SOLUTION INTRAVENOUS CONTINUOUS
Status: CANCELLED | OUTPATIENT
Start: 2023-08-22 | End: 2023-08-25

## 2023-08-22 RX ORDER — FENTANYL CITRATE 50 UG/ML
INJECTION, SOLUTION INTRAMUSCULAR; INTRAVENOUS
Status: DISCONTINUED | OUTPATIENT
Start: 2023-08-22 | End: 2023-08-22

## 2023-08-22 RX ORDER — TALC
6 POWDER (GRAM) TOPICAL NIGHTLY PRN
Status: DISCONTINUED | OUTPATIENT
Start: 2023-08-22 | End: 2023-08-28 | Stop reason: HOSPADM

## 2023-08-22 RX ORDER — OXYCODONE HCL 10 MG/1
10 TABLET, FILM COATED, EXTENDED RELEASE ORAL EVERY 12 HOURS
Status: DISCONTINUED | OUTPATIENT
Start: 2023-08-22 | End: 2023-08-23

## 2023-08-22 RX ORDER — HEPARIN SODIUM 5000 [USP'U]/ML
5000 INJECTION, SOLUTION INTRAVENOUS; SUBCUTANEOUS EVERY 8 HOURS
Status: DISCONTINUED | OUTPATIENT
Start: 2023-08-24 | End: 2023-08-28 | Stop reason: HOSPADM

## 2023-08-22 RX ORDER — ONDANSETRON 8 MG/1
8 TABLET, ORALLY DISINTEGRATING ORAL EVERY 6 HOURS PRN
Status: CANCELLED | OUTPATIENT
Start: 2023-08-22

## 2023-08-22 RX ORDER — ONDANSETRON 8 MG/1
8 TABLET, ORALLY DISINTEGRATING ORAL EVERY 6 HOURS PRN
Status: DISCONTINUED | OUTPATIENT
Start: 2023-08-22 | End: 2023-08-28 | Stop reason: HOSPADM

## 2023-08-22 RX ORDER — FENTANYL CITRATE 50 UG/ML
37.5 INJECTION, SOLUTION INTRAMUSCULAR; INTRAVENOUS ONCE
Status: COMPLETED | OUTPATIENT
Start: 2023-08-22 | End: 2023-08-22

## 2023-08-22 RX ORDER — PROCHLORPERAZINE EDISYLATE 5 MG/ML
5 INJECTION INTRAMUSCULAR; INTRAVENOUS EVERY 6 HOURS PRN
Status: CANCELLED | OUTPATIENT
Start: 2023-08-22

## 2023-08-22 RX ORDER — LIDOCAINE HYDROCHLORIDE 20 MG/ML
INJECTION, SOLUTION EPIDURAL; INFILTRATION; INTRACAUDAL; PERINEURAL
Status: DISCONTINUED | OUTPATIENT
Start: 2023-08-22 | End: 2023-08-22

## 2023-08-22 RX ORDER — ROCURONIUM BROMIDE 10 MG/ML
INJECTION, SOLUTION INTRAVENOUS
Status: DISCONTINUED | OUTPATIENT
Start: 2023-08-22 | End: 2023-08-22

## 2023-08-22 RX ORDER — DEXMEDETOMIDINE HYDROCHLORIDE 100 UG/ML
INJECTION, SOLUTION INTRAVENOUS
Status: DISCONTINUED | OUTPATIENT
Start: 2023-08-22 | End: 2023-08-22

## 2023-08-22 RX ORDER — BISACODYL 10 MG
10 SUPPOSITORY, RECTAL RECTAL DAILY
Status: CANCELLED | OUTPATIENT
Start: 2023-08-23

## 2023-08-22 RX ORDER — OXYCODONE HYDROCHLORIDE 10 MG/1
10 TABLET ORAL EVERY 6 HOURS PRN
Status: CANCELLED | OUTPATIENT
Start: 2023-08-22

## 2023-08-22 RX ORDER — REMIFENTANIL HYDROCHLORIDE 1 MG/ML
INJECTION, POWDER, LYOPHILIZED, FOR SOLUTION INTRAVENOUS CONTINUOUS PRN
Status: DISCONTINUED | OUTPATIENT
Start: 2023-08-22 | End: 2023-08-22

## 2023-08-22 RX ORDER — SODIUM CHLORIDE 9 MG/ML
INJECTION, SOLUTION INTRAVENOUS CONTINUOUS
Status: ACTIVE | OUTPATIENT
Start: 2023-08-22 | End: 2023-08-23

## 2023-08-22 RX ORDER — SODIUM,POTASSIUM PHOSPHATES 280-250MG
2 POWDER IN PACKET (EA) ORAL
Status: DISCONTINUED | OUTPATIENT
Start: 2023-08-22 | End: 2023-08-28

## 2023-08-22 RX ORDER — ONDANSETRON 2 MG/ML
INJECTION INTRAMUSCULAR; INTRAVENOUS
Status: DISCONTINUED | OUTPATIENT
Start: 2023-08-22 | End: 2023-08-22

## 2023-08-22 RX ORDER — PROPOFOL 10 MG/ML
VIAL (ML) INTRAVENOUS
Status: DISCONTINUED | OUTPATIENT
Start: 2023-08-22 | End: 2023-08-22

## 2023-08-22 RX ORDER — FENTANYL CITRATE 50 UG/ML
12.5 INJECTION, SOLUTION INTRAMUSCULAR; INTRAVENOUS ONCE
Status: COMPLETED | OUTPATIENT
Start: 2023-08-22 | End: 2023-08-22

## 2023-08-22 RX ORDER — LABETALOL HCL 20 MG/4 ML
10 SYRINGE (ML) INTRAVENOUS EVERY 4 HOURS PRN
Status: DISCONTINUED | OUTPATIENT
Start: 2023-08-22 | End: 2023-08-28 | Stop reason: HOSPADM

## 2023-08-22 RX ORDER — PHENYLEPHRINE HYDROCHLORIDE 10 MG/ML
INJECTION INTRAVENOUS
Status: DISCONTINUED | OUTPATIENT
Start: 2023-08-22 | End: 2023-08-22

## 2023-08-22 RX ADMIN — ROCURONIUM BROMIDE 30 MG: 10 INJECTION INTRAVENOUS at 03:08

## 2023-08-22 RX ADMIN — FENTANYL CITRATE 50 MCG: 50 INJECTION, SOLUTION INTRAMUSCULAR; INTRAVENOUS at 02:08

## 2023-08-22 RX ADMIN — METHOCARBAMOL 500 MG: 100 INJECTION, SOLUTION INTRAMUSCULAR; INTRAVENOUS at 09:08

## 2023-08-22 RX ADMIN — PHENYLEPHRINE HYDROCHLORIDE 160 MCG: 10 INJECTION INTRAVENOUS at 03:08

## 2023-08-22 RX ADMIN — SODIUM CHLORIDE, SODIUM ACETATE ANHYDROUS, SODIUM GLUCONATE, POTASSIUM CHLORIDE, AND MAGNESIUM CHLORIDE: 526; 222; 502; 37; 30 INJECTION, SOLUTION INTRAVENOUS at 06:08

## 2023-08-22 RX ADMIN — CEFAZOLIN 2 G: 330 INJECTION, POWDER, FOR SOLUTION INTRAMUSCULAR; INTRAVENOUS at 03:08

## 2023-08-22 RX ADMIN — SUGAMMADEX 200 MG: 100 INJECTION, SOLUTION INTRAVENOUS at 05:08

## 2023-08-22 RX ADMIN — DEXMEDETOMIDINE 4 MCG: 100 INJECTION, SOLUTION, CONCENTRATE INTRAVENOUS at 07:08

## 2023-08-22 RX ADMIN — Medication 10 MG: at 05:08

## 2023-08-22 RX ADMIN — OXYCODONE HYDROCHLORIDE 10 MG: 10 TABLET, FILM COATED, EXTENDED RELEASE ORAL at 09:08

## 2023-08-22 RX ADMIN — METHOCARBAMOL 750 MG: 750 TABLET ORAL at 12:08

## 2023-08-22 RX ADMIN — GLYCOPYRROLATE 0.1 MG: 0.2 INJECTION, SOLUTION INTRAMUSCULAR; INTRAVENOUS at 05:08

## 2023-08-22 RX ADMIN — SODIUM CHLORIDE, SODIUM ACETATE ANHYDROUS, SODIUM GLUCONATE, POTASSIUM CHLORIDE, AND MAGNESIUM CHLORIDE: 526; 222; 502; 37; 30 INJECTION, SOLUTION INTRAVENOUS at 05:08

## 2023-08-22 RX ADMIN — FENTANYL CITRATE 25 MCG: 50 INJECTION, SOLUTION INTRAMUSCULAR; INTRAVENOUS at 07:08

## 2023-08-22 RX ADMIN — SODIUM CHLORIDE: 9 INJECTION, SOLUTION INTRAVENOUS at 09:08

## 2023-08-22 RX ADMIN — FENTANYL CITRATE 50 MCG: 50 INJECTION, SOLUTION INTRAMUSCULAR; INTRAVENOUS at 05:08

## 2023-08-22 RX ADMIN — SODIUM CHLORIDE, SODIUM ACETATE ANHYDROUS, SODIUM GLUCONATE, POTASSIUM CHLORIDE, AND MAGNESIUM CHLORIDE: 526; 222; 502; 37; 30 INJECTION, SOLUTION INTRAVENOUS at 03:08

## 2023-08-22 RX ADMIN — DEXMEDETOMIDINE 4 MCG: 100 INJECTION, SOLUTION, CONCENTRATE INTRAVENOUS at 06:08

## 2023-08-22 RX ADMIN — FENTANYL CITRATE 50 MCG: 50 INJECTION, SOLUTION INTRAMUSCULAR; INTRAVENOUS at 07:08

## 2023-08-22 RX ADMIN — Medication 10 MG: at 06:08

## 2023-08-22 RX ADMIN — SODIUM CHLORIDE 0.5 MCG/KG/MIN: 9 INJECTION, SOLUTION INTRAVENOUS at 03:08

## 2023-08-22 RX ADMIN — DEXAMETHASONE SODIUM PHOSPHATE 8 MG: 4 INJECTION, SOLUTION INTRAMUSCULAR; INTRAVENOUS at 03:08

## 2023-08-22 RX ADMIN — REMIFENTANIL HYDROCHLORIDE 0.2 MCG/KG/MIN: 1 INJECTION, POWDER, LYOPHILIZED, FOR SOLUTION INTRAVENOUS at 03:08

## 2023-08-22 RX ADMIN — LIDOCAINE HYDROCHLORIDE 60 MG: 20 INJECTION, SOLUTION EPIDURAL; INFILTRATION; INTRACAUDAL; PERINEURAL at 03:08

## 2023-08-22 RX ADMIN — Medication 20 MG: at 03:08

## 2023-08-22 RX ADMIN — GLYCOPYRROLATE 0.2 MG: 0.2 INJECTION, SOLUTION INTRAMUSCULAR; INTRAVENOUS at 03:08

## 2023-08-22 RX ADMIN — ONDANSETRON 4 MG: 2 INJECTION INTRAMUSCULAR; INTRAVENOUS at 06:08

## 2023-08-22 RX ADMIN — SODIUM CHLORIDE 500 ML: 0.9 INJECTION, SOLUTION INTRAVENOUS at 10:08

## 2023-08-22 RX ADMIN — SODIUM CHLORIDE: 9 INJECTION, SOLUTION INTRAVENOUS at 12:08

## 2023-08-22 RX ADMIN — PROPOFOL 160 MG: 10 INJECTION, EMULSION INTRAVENOUS at 03:08

## 2023-08-22 RX ADMIN — GABAPENTIN 300 MG: 300 CAPSULE ORAL at 09:08

## 2023-08-22 RX ADMIN — FENTANYL CITRATE 12.5 MCG: 50 INJECTION INTRAMUSCULAR; INTRAVENOUS at 08:08

## 2023-08-22 RX ADMIN — PROPOFOL 100 MCG/KG/MIN: 10 INJECTION, EMULSION INTRAVENOUS at 03:08

## 2023-08-22 RX ADMIN — ROCURONIUM BROMIDE 30 MG: 10 INJECTION INTRAVENOUS at 04:08

## 2023-08-22 RX ADMIN — FENTANYL CITRATE 37.5 MCG: 50 INJECTION INTRAMUSCULAR; INTRAVENOUS at 11:08

## 2023-08-22 NOTE — SUBJECTIVE & OBJECTIVE
"Interval History: NAEON. NPO today in anticipation of OR for T10-11 discectomy with T8-L2 fusion. Neuro exam stable.     Medications:  Continuous Infusions:   sodium chloride 0.9% 50 mL/hr at 08/22/23 0029     Scheduled Meds:   gabapentin  300 mg Oral QHS     PRN Meds:acetaminophen, dextrose 10%, dextrose 10%, famotidine, glucagon (human recombinant), glucose, glucose, glucose, hydrALAZINE, methocarbamoL     Review of Systems  Objective:     Weight: 68.5 kg (151 lb)  Body mass index is 25.92 kg/m².  Vital Signs (Most Recent):  Temp: 98.5 °F (36.9 °C) (08/22/23 0743)  Pulse: 76 (08/22/23 0743)  Resp: 18 (08/22/23 0743)  BP: 138/72 (08/22/23 0743)  SpO2: 100 % (08/22/23 0743) Vital Signs (24h Range):  Temp:  [97.5 °F (36.4 °C)-98.5 °F (36.9 °C)] 98.5 °F (36.9 °C)  Pulse:  [60-89] 76  Resp:  [15-20] 18  SpO2:  [95 %-100 %] 100 %  BP: (124-166)/(63-92) 138/72                              Urethral Catheter 08/21/23 1530 Non-latex 16 Fr. (Active)   Site Assessment Clean;Intact 08/22/23 0200   Collection Container Urimeter 08/22/23 0200   Securement Method secured to top of thigh w/ adhesive device 08/22/23 0200   Catheter Care Performed yes 08/22/23 0600   Reason for Continuing Urinary Catheterization Urinary retention 08/22/23 0200   CAUTI Prevention Bundle Securement Device in place with 1" slack;Intact seal between catheter & drainage tubing;Drainage bag/urimeter off the floor;Sheeting clip in use;No dependent loops or kinks;Drainage bag/urimeter not overfilled (<2/3 full);Drainage bag/urimeter below bladder 08/21/23 2355   Output (mL) 1000 mL 08/22/23 0538          Physical Exam         Neurosurgery Physical Exam  General: well developed, well nourished, no distress.   Head: normocephalic, atraumatic  Neurologic: Alert and oriented. Thought content appropriate.  GCS: Motor: 6/Verbal: 5/Eyes: 4 GCS Total: 15  Mental Status: Awake, Alert, Oriented x 4  Language: No aphasia  Speech: No dysarthria  Cranial nerves: face " symmetric, tongue midline, CN II-XII grossly intact, EOMI.   Pulmonary: normal respirations, no signs of respiratory distress  Abdomen: soft, non-distended, not tender to palpation  Sensory: intact to light touch throughout  Motor Strength: Involuntary spasms to BLE. No abnormal movements seen.      Strength   Deltoids Triceps Biceps Wrist Extension Wrist Flexion Hand    Upper: R 5/5 5/5 5/5 5/5 5/5 5/5     L 5/5 5/5 5/5 5/5 5/5 5/5       Iliopsoas Quadriceps Knee  Flexion Tibialis  anterior Gastro- cnemius EHL   Lower: R 3/5 4/5 4/5 4+/5 4+/5 4+/5     L 2/5 2/5 2/5 0/5 0/5 2/5      Burnett: absent  Clonus: absent  Babinski: absent  Vascular: No LE edema.   Skin: Skin is warm, dry and intact.    Significant Labs:  Recent Labs   Lab 08/21/23  1116 08/22/23  0522   GLU 88 89    139   K 4.1 4.4    109   CO2 24 24   BUN 12 13   CREATININE 0.6 0.6   CALCIUM 9.2 8.7     Recent Labs   Lab 08/21/23  1116 08/22/23  0522   WBC 6.64 5.55   HGB 11.6* 11.0*   HCT 35.1* 32.4*    223     Recent Labs   Lab 08/21/23  1206   INR 0.9   APTT <21.0     Microbiology Results (last 7 days)       ** No results found for the last 168 hours. **          All pertinent labs from the last 24 hours have been reviewed.    Significant Diagnostics:  MRI Lumbar Spine Without Contrast    Result Date: 8/22/2023  Postoperative changes L1 through S1 posterior instrumented fusion.  There is susceptibility artifact which limits evaluation. Multilevel degenerative changes as above, noting susceptibility artifact limits evaluation of the neural foramina at multiple levels.  Findings most significantly contribute to severe left neural foraminal narrowing at L5-S1.  No high-grade canal stenosis. Stable fluid collection in the subcutaneous tissues at the operative bed. Additional findings discussed above and identified on same day thoracic spine MRI. Electronically signed by resident: Juan Wright Date:    08/22/2023 Time:    00:09  Electronically signed by: Ayush Back MD Date:    08/22/2023 Time:    01:15    US Abdomen Complete    Result Date: 8/22/2023  Hepatic cyst in the left lobe which correlates with MRI T-spine from same date.  No suspicious focal liver lesions. Cholelithiasis.  Mild gallbladder wall thickening without other convincing signs of acute cholecystitis. Left kidney simple cyst. Electronically signed by resident: Juan Wright Date:    08/21/2023 Time:    23:40 Electronically signed by: Ayush Back MD Date:    08/22/2023 Time:    00:20    US Lower Extremity Veins Bilateral    Result Date: 8/21/2023  1. No evidence of DVT bilaterally. 2. Baker cyst in the right popliteal fossa. Electronically signed by: Armando Camarillo Date:    08/21/2023 Time:    23:11    X-Ray Chest AP Portable    Result Date: 8/21/2023  1. No convincing acute cardiopulmonary process noting coarse interstitial attenuation suggests underlying COPD/emphysema.  Correlation is advised. Electronically signed by: Jovan Cartagena MD Date:    08/21/2023 Time:    19:28    CT Lumbar Spine Without Contrast    Result Date: 8/21/2023  1. High attenuating lobular focus lies posterior to the T10 vertebral body, correlating with low signal focus on previous MRI.  This is suspected to reflect herniated nucleus polyposis in the setting of advanced degenerative disc disease as there is vacuum disc phenomenon at the T10-T11 disc space.  Although there is a punctate focus of air in the region, this is likely related to vacuum disc phenomenon extending from the joint space and left facet rather than infectious process.  No significant soft tissue edema in the region to support a diagnosis of infection.  Correlation however is advised, and correlation with patient's symptomatology and findings on prior MRI advised. 2. Evolving surgical changes of the lumbar spine as described noting resolution of prior postoperative fluid collection.  No definite fracture although evaluation  for the same is significantly limited given the extent of motion artifact. 3. Please see above for several additional findings. Electronically signed by: Jovan Cartagena MD Date:    08/21/2023 Time:    18:41    CT Thoracic Spine Without Contrast    Result Date: 8/21/2023  1. High attenuating lobular focus lies posterior to the T10 vertebral body, correlating with low signal focus on previous MRI.  This is suspected to reflect herniated nucleus polyposis in the setting of advanced degenerative disc disease as there is vacuum disc phenomenon at the T10-T11 disc space.  Although there is a punctate focus of air in the region, this is likely related to vacuum disc phenomenon extending from the joint space and left facet rather than infectious process.  No significant soft tissue edema in the region to support a diagnosis of infection.  Correlation however is advised, and correlation with patient's symptomatology and findings on prior MRI advised. 2. Evolving surgical changes of the lumbar spine as described noting resolution of prior postoperative fluid collection.  No definite fracture although evaluation for the same is significantly limited given the extent of motion artifact. 3. Please see above for several additional findings. Electronically signed by: Jovan Cartagena MD Date:    08/21/2023 Time:    18:41    MRI Cervical Spine Without Contrast    Result Date: 8/21/2023  1. Postoperative change of ACDF at C5-C7. 2. Multilevel degenerative changes detailed above.  Severe spinal canal stenosis noted at C3-C4, moderate at C2-C3, C4-C5, and C6-C7.  Moderate to severe neural foraminal narrowing noted at C3-C5 and C6-T1. Electronically signed by: Santos Pacheco MD Date:    08/21/2023 Time:    15:49    XR Cervical Spine AP Lateral w/ Flex Ext and Swimmers Flex Ext Without Odontoid    Result Date: 8/21/2023  No fracture.  Additional findings above. Electronically signed by: Trell Church MD Date:    08/21/2023  Time:    15:41    MRI Thoracic Spine Without Contrast    Result Date: 8/21/2023  1. Disc extrusion likely containing vacuum phenomenon or calcification at T10-T11 resulting in severe spinal canal stenosis.  Associated spinal cord edema or myelomalacia noted. 2. Fluid signal within the T10-T11 disc space favored to be degenerative.  Absence of endplate erosion and probable associated vacuum phenomenon favor degenerative etiology.  Recommend further evaluation with CT as well as clinical assessment. 3. Several T2 hyperintense lesions within the liver, incompletely characterized.  Recommend abdominal ultrasound for further evaluation. Electronically signed by: Santos Pacheco MD Date:    08/21/2023 Time:    15:10    X-ray Lumbar Spine Flexion And Extension Only    Result Date: 8/21/2023  Allowing for the above limitations, flexion and extension lateral views of the lumbar spine demonstrate no subluxation. Electronically signed by: Kenny Rodrigez Date:    08/21/2023 Time:    14:29    X-Ray Hip 2 or 3 views Left (with Pelvis when performed)    Result Date: 8/21/2023  Allowing for the above limitations, no acute radiographic abnormality is demonstrated in the left total hip prosthesis or visualized portion of the osseous pelvic ring. Partially visualized right total hip prosthesis. Electronically signed by: Kenny Rodrigez Date:    08/21/2023 Time:    14:20

## 2023-08-22 NOTE — PLAN OF CARE
Problem: Infection  Goal: Absence of Infection Signs and Symptoms  Outcome: Ongoing, Progressing     Problem: Adult Inpatient Plan of Care  Goal: Plan of Care Review  Outcome: Ongoing, Progressing  Goal: Patient-Specific Goal (Individualized)  Outcome: Ongoing, Progressing  Goal: Absence of Hospital-Acquired Illness or Injury  Outcome: Ongoing, Progressing  Goal: Optimal Comfort and Wellbeing  Outcome: Ongoing, Progressing  Goal: Readiness for Transition of Care  Outcome: Ongoing, Progressing     Problem: Fall Injury Risk  Goal: Absence of Fall and Fall-Related Injury  Outcome: Ongoing, Progressing     Problem: Skin Injury Risk Increased  Goal: Skin Health and Integrity  Outcome: Ongoing, Progressing    Robaxin given for pain. NPO since midnight for spinal fusion today. Prakash in place for retention. VSS. RA. NSR on the monitor. No acute events, will continue to monitor.

## 2023-08-22 NOTE — HOSPITAL COURSE
8/22: NAEON. NPO today in anticipation of OR for T10-11 discectomy with T8-L2 fusion. Neuro exam stable.   8/23: POD 1 s/p T10-11 discectomy w T8-L2 fusion. NAEON  8/24: POD 2 s/p T10-11 discectomy w T8-L2 fusion. Hypotensive episodes  8/25: Hb responded well to 2 U PRBC. Hb 10.3 this morning. Hypoension resolving. Lower extremity strength improving.   8/26: neuro stable but complaining of right hip pain. Left leg strength has improved a lot since surgery. Drain with about 100cc serosanguinous output. PT to work with her this weekend.   8/27: POD 5. NAEON. Drain put out 60cc serosanguinous fluid overnight. Hb increased to 10.7. PT did not see yesterday, should see today.  8/28: NAEON. AFVSS. Labs wnl. Exam stable. Up in chair. Eager to continue therapy to improve mobility. HV and WV removed. Scheduled robaxin for LE spasms, added lidocaine patch for b/l hip pain. Pending discharge to [facility] once she has a post-op BM.

## 2023-08-22 NOTE — ANESTHESIA PROCEDURE NOTES
Arterial    Diagnosis: Thoracic Stenosis  Doctor requesting consult: Mannie    Patient location during procedure: done in OR  Procedure start time: 8/22/2023 3:07 PM  Timeout: 8/22/2023 3:07 PM  Procedure end time: 8/22/2023 3:10 PM    Staffing  Authorizing Provider: Hina De Leon MD  Performing Provider: Bob Mackenzie MD    Staffing  Performed by: Bob Mackenzie MD  Authorized by: Hina De Leon MD    Anesthesiologist was present at the time of the procedure.    Preanesthetic Checklist  Completed: patient identified, IV checked, site marked, risks and benefits discussed, surgical consent, monitors and equipment checked, pre-op evaluation, timeout performed and anesthesia consent givenArterial  Skin Prep: chlorhexidine gluconate  Local Infiltration: none  Orientation: right  Location: radial    Catheter Size: 20 G  Catheter placement by Anatomical landmarks. Heme positive aspiration all ports. Insertion Attempts: 1  Assessment  Dressing: secured with tape and tegaderm  Patient: Tolerated well

## 2023-08-22 NOTE — ANESTHESIA PROCEDURE NOTES
Intubation    Date/Time: 8/22/2023 3:05 PM    Performed by: Alyssa Jones MD  Authorized by: Gregorio Paredes MD    Intubation:     Induction:  Intravenous    Intubated:  Postinduction    Mask Ventilation:  Easy mask    Attempts:  1    Attempted By:  Resident anesthesiologist    Method of Intubation:  Direct    Blade:  Dias 2    Laryngeal View Grade: Grade I - full view of cords      Difficult Airway Encountered?: No      Complications:  None    Airway Device:  Oral endotracheal tube    Airway Device Size:  7.5    Style/Cuff Inflation:  Cuffed (inflated to minimal occlusive pressure)    Tube secured:  22    Secured at:  The teeth    Placement Verified By:  Capnometry    Complicating Factors:  None    Findings Post-Intubation:  BS equal bilateral and atraumatic/condition of teeth unchanged

## 2023-08-22 NOTE — ASSESSMENT & PLAN NOTE
78F PHx significant for osteoporosis (Prolia), HTN, Abreu-Stickler syndrome, cervical and lumbosacral fusion (EJ, 2001 and 2008), presenting with one month of BLE weakness, urinary retention, gait instability and MRI C/T/L spine demonstrating moderate canal stenosis T10-T11 2/2 calcified disc herniation with cord signal change now s/p T10-T11 discectomy with T8-pelvis extension and revision of prior fusion       -Admit to NCC for close monitoring in post op setting  -Neuro checks q1hr  -Vital signs q1hr  -NSGY following  -MAP goals >65  -SBP goals <160  -PRN labetalol, hydralazine  -No HOB restrictions  -Post op xrays pending  - PT/INR, aPTT pending  -CBC, CMP, Mag, Phos pending, then daily   -Pain control as appropriate   - Abx: ancef while drains in place per nsgy  -HV drains per NSGY  -PT/OT/SLP

## 2023-08-22 NOTE — HPI
78F PHx significant for osteoporosis (Prolia), HTN, Abreu-Stickler syndrome, cervical and lumbosacral fusion (EJ, 2001 and 2008), presenting with one month of BLE weakness, urinary retention, gait instability and MRI C/T/L spine demonstrating moderate canal stenosis T10-T11 2/2 calcified disc herniation with cord signal change now s/p T10-T11 discectomy with T8-pelvis extension and revision of prior fusion. Patient has chronic L foot drop since original surgeries. Prakash was placed in ED due to retention.     Patient went to OR w/ NSGY 8/22 for T8-L2 fusion with T9-11 Rene osteotomies and T10/T11 facetectomies and discectomy from right sided approach. Admit to NCC post operatively.

## 2023-08-22 NOTE — NURSING
Nurses Note -- 4 Eyes      8/22/2023   2:28 AM      Skin assessed during: Admit      [] No Altered Skin Integrity Present    []Prevention Measures Documented      [x] Yes- Altered Skin Integrity Present or Discovered   [x] LDA Added if Not in Epic (Describe Wound) Left second toe non-blanchable redness. Bilateral posterior knee skin tears. Generalized scratches.   [] New Altered Skin Integrity was Present on Admit and Documented in LDA   [] Wound Image Taken    Wound Care Consulted? No    Attending Nurse:  Yessi KISER RN    Second RN/Staff Member:   Vish RN

## 2023-08-22 NOTE — PROGRESS NOTES
[facility] ([facility])  Neurosurgery  Progress Note    Subjective:     History of Present Illness: Emilia Pablo is a 78 y.o. female with PMH of osteoporosis- on Prolia, HTN, and Abreu-Stickler Syndrome. She has a surgical hx of 2001 cervical fusion and 2008 lumbosacral fusion - EJ - did well from surgery, chronic foot drop on left since surgery. She was evaluated by Dr. Aminta FARRIS one month ago for weakness, gait imbalance and LLE weakness and the patient was referred to Dr. Chand for further evaluation. She was not able to complete the outpatient workup due to new neuro deficits. Reports a fall trying to transfer from the chair to the toilet. She denies hitting her head/back or LOC. Reports since then she noticed worsening weakness RLE>LLE and some bowel incontinence. She feels the urge to have a BM but reports episodes of incontinence. Also notes urinary urgency x 2 weeks. Prakash placed in ED due to retention, bladder scan 660cc without the urge to void. She has nondermatomal patchy numbness in the BLE in the feet and thighs. Denies saddle anesthesia, UE weakness or numbness. NSGY consulted for evaluation.       Post-Op Info:  Procedure(s) (LRB):  FUSION, SPINE (N/A)         Interval History: NAEON. NPO today in anticipation of OR for T10-11 discectomy with T8-L2 fusion. Neuro exam stable.     Medications:  Continuous Infusions:   sodium chloride 0.9% 50 mL/hr at 08/22/23 0029     Scheduled Meds:   gabapentin  300 mg Oral QHS     PRN Meds:acetaminophen, dextrose 10%, dextrose 10%, famotidine, glucagon (human recombinant), glucose, glucose, glucose, hydrALAZINE, methocarbamoL     Review of Systems  Objective:     Weight: 68.5 kg (151 lb)  Body mass index is 25.92 kg/m².  Vital Signs (Most Recent):  Temp: 98.5 °F (36.9 °C) (08/22/23 0743)  Pulse: 76 (08/22/23 0743)  Resp: 18 (08/22/23 0743)  BP: 138/72 (08/22/23 0743)  SpO2: 100 % (08/22/23 0743) Vital Signs (24h Range):  Temp:  [97.5 °F (36.4  "°C)-98.5 °F (36.9 °C)] 98.5 °F (36.9 °C)  Pulse:  [60-89] 76  Resp:  [15-20] 18  SpO2:  [95 %-100 %] 100 %  BP: (124-166)/(63-92) 138/72                              Urethral Catheter 08/21/23 1530 Non-latex 16 Fr. (Active)   Site Assessment Clean;Intact 08/22/23 0200   Collection Container Urimeter 08/22/23 0200   Securement Method secured to top of thigh w/ adhesive device 08/22/23 0200   Catheter Care Performed yes 08/22/23 0600   Reason for Continuing Urinary Catheterization Urinary retention 08/22/23 0200   CAUTI Prevention Bundle Securement Device in place with 1" slack;Intact seal between catheter & drainage tubing;Drainage bag/urimeter off the floor;Sheeting clip in use;No dependent loops or kinks;Drainage bag/urimeter not overfilled (<2/3 full);Drainage bag/urimeter below bladder 08/21/23 2355   Output (mL) 1000 mL 08/22/23 0538          Physical Exam         Neurosurgery Physical Exam  General: well developed, well nourished, no distress.   Head: normocephalic, atraumatic  Neurologic: Alert and oriented. Thought content appropriate.  GCS: Motor: 6/Verbal: 5/Eyes: 4 GCS Total: 15  Mental Status: Awake, Alert, Oriented x 4  Language: No aphasia  Speech: No dysarthria  Cranial nerves: face symmetric, tongue midline, CN II-XII grossly intact, EOMI.   Pulmonary: normal respirations, no signs of respiratory distress  Abdomen: soft, non-distended, not tender to palpation  Sensory: intact to light touch throughout  Motor Strength: Involuntary spasms to BLE. No abnormal movements seen.      Strength   Deltoids Triceps Biceps Wrist Extension Wrist Flexion Hand    Upper: R 5/5 5/5 5/5 5/5 5/5 5/5     L 5/5 5/5 5/5 5/5 5/5 5/5       Iliopsoas Quadriceps Knee  Flexion Tibialis  anterior Gastro- cnemius EHL   Lower: R 3/5 4/5 4/5 4+/5 4+/5 4+/5     L 2/5 2/5 2/5 0/5 0/5 2/5      Burnett: absent  Clonus: absent  Babinski: absent  Vascular: No LE edema.   Skin: Skin is warm, dry and intact.    Significant " Labs:  Recent Labs   Lab 08/21/23  1116 08/22/23  0522   GLU 88 89    139   K 4.1 4.4    109   CO2 24 24   BUN 12 13   CREATININE 0.6 0.6   CALCIUM 9.2 8.7     Recent Labs   Lab 08/21/23  1116 08/22/23  0522   WBC 6.64 5.55   HGB 11.6* 11.0*   HCT 35.1* 32.4*    223     Recent Labs   Lab 08/21/23  1206   INR 0.9   APTT <21.0     Microbiology Results (last 7 days)       ** No results found for the last 168 hours. **          All pertinent labs from the last 24 hours have been reviewed.    Significant Diagnostics:  MRI Lumbar Spine Without Contrast    Result Date: 8/22/2023  Postoperative changes L1 through S1 posterior instrumented fusion.  There is susceptibility artifact which limits evaluation. Multilevel degenerative changes as above, noting susceptibility artifact limits evaluation of the neural foramina at multiple levels.  Findings most significantly contribute to severe left neural foraminal narrowing at L5-S1.  No high-grade canal stenosis. Stable fluid collection in the subcutaneous tissues at the operative bed. Additional findings discussed above and identified on same day thoracic spine MRI. Electronically signed by resident: Juan Wright Date:    08/22/2023 Time:    00:09 Electronically signed by: Ayush Back MD Date:    08/22/2023 Time:    01:15    US Abdomen Complete    Result Date: 8/22/2023  Hepatic cyst in the left lobe which correlates with MRI T-spine from same date.  No suspicious focal liver lesions. Cholelithiasis.  Mild gallbladder wall thickening without other convincing signs of acute cholecystitis. Left kidney simple cyst. Electronically signed by resident: Juan Wright Date:    08/21/2023 Time:    23:40 Electronically signed by: Ayush Back MD Date:    08/22/2023 Time:    00:20    US Lower Extremity Veins Bilateral    Result Date: 8/21/2023  1. No evidence of DVT bilaterally. 2. Baker cyst in the right popliteal fossa. Electronically signed by: Armando Camarillo  Date:    08/21/2023 Time:    23:11    X-Ray Chest AP Portable    Result Date: 8/21/2023  1. No convincing acute cardiopulmonary process noting coarse interstitial attenuation suggests underlying COPD/emphysema.  Correlation is advised. Electronically signed by: Jovan Cartagena MD Date:    08/21/2023 Time:    19:28    CT Lumbar Spine Without Contrast    Result Date: 8/21/2023  1. High attenuating lobular focus lies posterior to the T10 vertebral body, correlating with low signal focus on previous MRI.  This is suspected to reflect herniated nucleus polyposis in the setting of advanced degenerative disc disease as there is vacuum disc phenomenon at the T10-T11 disc space.  Although there is a punctate focus of air in the region, this is likely related to vacuum disc phenomenon extending from the joint space and left facet rather than infectious process.  No significant soft tissue edema in the region to support a diagnosis of infection.  Correlation however is advised, and correlation with patient's symptomatology and findings on prior MRI advised. 2. Evolving surgical changes of the lumbar spine as described noting resolution of prior postoperative fluid collection.  No definite fracture although evaluation for the same is significantly limited given the extent of motion artifact. 3. Please see above for several additional findings. Electronically signed by: Jovan Cartagena MD Date:    08/21/2023 Time:    18:41    CT Thoracic Spine Without Contrast    Result Date: 8/21/2023  1. High attenuating lobular focus lies posterior to the T10 vertebral body, correlating with low signal focus on previous MRI.  This is suspected to reflect herniated nucleus polyposis in the setting of advanced degenerative disc disease as there is vacuum disc phenomenon at the T10-T11 disc space.  Although there is a punctate focus of air in the region, this is likely related to vacuum disc phenomenon extending from the joint space and left  facet rather than infectious process.  No significant soft tissue edema in the region to support a diagnosis of infection.  Correlation however is advised, and correlation with patient's symptomatology and findings on prior MRI advised. 2. Evolving surgical changes of the lumbar spine as described noting resolution of prior postoperative fluid collection.  No definite fracture although evaluation for the same is significantly limited given the extent of motion artifact. 3. Please see above for several additional findings. Electronically signed by: Jovan Cartagena MD Date:    08/21/2023 Time:    18:41    MRI Cervical Spine Without Contrast    Result Date: 8/21/2023  1. Postoperative change of ACDF at C5-C7. 2. Multilevel degenerative changes detailed above.  Severe spinal canal stenosis noted at C3-C4, moderate at C2-C3, C4-C5, and C6-C7.  Moderate to severe neural foraminal narrowing noted at C3-C5 and C6-T1. Electronically signed by: Santos Pacheco MD Date:    08/21/2023 Time:    15:49    XR Cervical Spine AP Lateral w/ Flex Ext and Swimmers Flex Ext Without Odontoid    Result Date: 8/21/2023  No fracture.  Additional findings above. Electronically signed by: Trell Church MD Date:    08/21/2023 Time:    15:41    MRI Thoracic Spine Without Contrast    Result Date: 8/21/2023  1. Disc extrusion likely containing vacuum phenomenon or calcification at T10-T11 resulting in severe spinal canal stenosis.  Associated spinal cord edema or myelomalacia noted. 2. Fluid signal within the T10-T11 disc space favored to be degenerative.  Absence of endplate erosion and probable associated vacuum phenomenon favor degenerative etiology.  Recommend further evaluation with CT as well as clinical assessment. 3. Several T2 hyperintense lesions within the liver, incompletely characterized.  Recommend abdominal ultrasound for further evaluation. Electronically signed by: Santos Pacheco MD Date:    08/21/2023 Time:    15:10    X-ray  Lumbar Spine Flexion And Extension Only    Result Date: 8/21/2023  Allowing for the above limitations, flexion and extension lateral views of the lumbar spine demonstrate no subluxation. Electronically signed by: Kenny Rodrigez Date:    08/21/2023 Time:    14:29    X-Ray Hip 2 or 3 views Left (with Pelvis when performed)    Result Date: 8/21/2023  Allowing for the above limitations, no acute radiographic abnormality is demonstrated in the left total hip prosthesis or visualized portion of the osseous pelvic ring. Partially visualized right total hip prosthesis. Electronically signed by: Kenny Rodrigez Date:    08/21/2023 Time:    14:20       Assessment/Plan:   Thoracic spinal stenosis:     Emilia Pablo is a 78 y.o. female with PMH of osteoporosis- on Prolia, HTN, Abreu-Stickler Syndrome, worsening BLE weakness x 3 months who presents for evaluation of acute myelopathy.     - No acute neurosurgical intervention at this time, pending completed workup. Sx present since Friday.   - MRI cervical and thoracic spine shows moderate canal stenosis C4-5, T10-11 2/2 calcified disc herniation with cord signal change, multilevel foraminal stenosis.   - MRI lumbar spine with stable lumbar collection in subcutaneous tissues. No significant central stenosis. Prior MRI L spine 8/2/2023 limited evaluation 2/2 hardware artifact, but without severe canal stenosis.   - Prakash placed for urinary retention  - NPO/ booked for OR today for T10-11 discectomy and extension/revision of prior fusion to T8-pelvis. Preop labs WNL.   - Discussed with Dr. Chand.    Savana Mckenzie PA-C  Neurosurgery  [facility] - Neurosurgery ([facility])

## 2023-08-22 NOTE — PLAN OF CARE
[facility] - Surgery (2nd Fl)  Initial Discharge Assessment       Primary Care Provider: Dave Gibson MD    Admission Diagnosis: Urinary retention [R33.9]  Preop examination [Z01.818]  Thoracic stenosis [M48.04]  Spinal stenosis, unspecified spinal region [M48.00]  Acute myelopathy [G95.9]    Admission Date: 8/21/2023  Expected Discharge Date:     Transition of Care Barriers: None    Payor: MEDICARE / Plan: MEDICARE PART A & B / Product Type: Government /     Extended Emergency Contact Information  Primary Emergency Contact: Haseeb Pablo  Address: [address] [place] [address] [facility]  Home Phone: [phone]  Mobile Phone: [phone]  Relation: Son  Secondary Emergency Contact: Abdirahman Pablo  Address: [address] [place] [address] [place]  Mobile Phone: [phone]  Relation: Son    Discharge Plan A: Rehab  Discharge Plan B: Skilled Nursing Facility      [facility] - [facility] [address]  [address] [facility] [address]  Phone: [phone] Fax: [phone]    [facility], [place] - [address] Counts include 234 beds at the [facility]  [address] [facility] [address]  Phone: [phone] Fax: [phone]    CM obtained discharge planning assessment with patient, multiple family members at bedside.  Patient provided with discharge planning booklet.    Initial Assessment (most recent)       Adult Discharge Assessment - 08/22/23 1350          Discharge Assessment    Assessment Type Discharge Planning Assessment     Confirmed/corrected address, phone number and insurance Yes     Source of Information patient     Communicated DAPHNEY with patient/caregiver Date not available/Unable to determine     Reason For Admission thoracic spinal stenosis     People in Home child(adal), adult     Do you expect to return to your current living situation? Yes     Do you have help at home or someone to help you manage your care  at home? Yes     Who are your caregiver(s) and their phone number(s)? Haseeb Pablo - son [phone]     Prior to hospitilization cognitive status: Alert/Oriented     Current cognitive status: Alert/Oriented     Walking or Climbing Stairs ambulation difficulty, requires equipment     Mobility Management standard walker (borrowed) and transport chair     Equipment Currently Used at Home walker, standard;other (see comments)   transport chair    Readmission within 30 days? No     Patient currently being followed by outpatient case management? No     Do you currently have service(s) that help you manage your care at home? No     Do you take prescription medications? Yes     Do you have prescription coverage? Yes     Coverage MEDICARE - MEDICARE PART A & B     Do you have any problems affording any of your prescribed medications? No     Is the patient taking medications as prescribed? yes     Who is going to help you get home at discharge? family     How do you get to doctors appointments? family or friend will provide     Are you on dialysis? No     Do you take coumadin? No     DME Needed Upon Discharge  other (see comments)   tbd    Discharge Plan discussed with: Patient;Adult children     Transition of Care Barriers None     Discharge Plan A Rehab     Discharge Plan B Skilled Nursing Facility        Physical Activity    On average, how many days per week do you engage in moderate to strenuous exercise (like a brisk walk)? 3 days     On average, how many minutes do you engage in exercise at this level? 20 min        Financial Resource Strain    How hard is it for you to pay for the very basics like food, housing, medical care, and heating? Not hard at all        Housing Stability    In the last 12 months, was there a time when you were not able to pay the mortgage or rent on time? No     In the last 12 months, was there a time when you did not have a steady place to sleep or slept in a shelter (including now)? No         Transportation Needs    In the past 12 months, has lack of transportation kept you from medical appointments or from getting medications? No     In the past 12 months, has lack of transportation kept you from meetings, work, or from getting things needed for daily living? No        Food Insecurity    Within the past 12 months, you worried that your food would run out before you got the money to buy more. Never true     Within the past 12 months, the food you bought just didn't last and you didn't have money to get more. Never true        Stress    Do you feel stress - tense, restless, nervous, or anxious, or unable to sleep at night because your mind is troubled all the time - these days? Not at all        Social Connections    In a typical week, how many times do you talk on the phone with family, friends, or neighbors? More than three times a week     How often do you get together with friends or relatives? More than three times a week     How often do you attend Scientologist or Mu-ism services? More than 4 times per year     Do you belong to any clubs or organizations such as Scientologist groups, unions, fraternal or athletic groups, or school groups? Yes     How often do you attend meetings of the clubs or organizations you belong to? More than 4 times per year        Alcohol Use    Q1: How often do you have a drink containing alcohol? 2-4 times a month     Q2: How many drinks containing alcohol do you have on a typical day when you are drinking? 1 or 2     Q3: How often do you have six or more drinks on one occasion? Never        OTHER    Name(s) of People in Home son

## 2023-08-23 LAB
ALBUMIN SERPL BCP-MCNC: 2.9 G/DL (ref 3.5–5.2)
ALP SERPL-CCNC: 33 U/L (ref 55–135)
ALT SERPL W/O P-5'-P-CCNC: 16 U/L (ref 10–44)
ANION GAP SERPL CALC-SCNC: 5 MMOL/L (ref 8–16)
AST SERPL-CCNC: 23 U/L (ref 10–40)
BASOPHILS # BLD AUTO: 0.01 K/UL (ref 0–0.2)
BASOPHILS NFR BLD: 0.1 % (ref 0–1.9)
BILIRUB SERPL-MCNC: 0.4 MG/DL (ref 0.1–1)
BUN SERPL-MCNC: 12 MG/DL (ref 8–23)
CA-I BLDV-SCNC: 0.91 MMOL/L (ref 1.06–1.42)
CALCIUM SERPL-MCNC: 6.8 MG/DL (ref 8.7–10.5)
CHLORIDE SERPL-SCNC: 113 MMOL/L (ref 95–110)
CO2 SERPL-SCNC: 17 MMOL/L (ref 23–29)
CREAT SERPL-MCNC: 0.6 MG/DL (ref 0.5–1.4)
DIFFERENTIAL METHOD: ABNORMAL
EOSINOPHIL # BLD AUTO: 0 K/UL (ref 0–0.5)
EOSINOPHIL NFR BLD: 0 % (ref 0–8)
ERYTHROCYTE [DISTWIDTH] IN BLOOD BY AUTOMATED COUNT: 13 % (ref 11.5–14.5)
ERYTHROCYTE [DISTWIDTH] IN BLOOD BY AUTOMATED COUNT: 13.3 % (ref 11.5–14.5)
ERYTHROCYTE [DISTWIDTH] IN BLOOD BY AUTOMATED COUNT: 13.4 % (ref 11.5–14.5)
EST. GFR  (NO RACE VARIABLE): >60 ML/MIN/1.73 M^2
GLUCOSE SERPL-MCNC: 132 MG/DL (ref 70–110)
HCT VFR BLD AUTO: 22.7 % (ref 37–48.5)
HCT VFR BLD AUTO: 24.7 % (ref 37–48.5)
HCT VFR BLD AUTO: 26 % (ref 37–48.5)
HCV RNA SERPL QL NAA+PROBE: NOT DETECTED
HCV RNA SPEC NAA+PROBE-ACNC: <12 IU/ML
HGB BLD-MCNC: 7.7 G/DL (ref 12–16)
HGB BLD-MCNC: 8.3 G/DL (ref 12–16)
HGB BLD-MCNC: 8.6 G/DL (ref 12–16)
IMM GRANULOCYTES # BLD AUTO: 0.03 K/UL (ref 0–0.04)
IMM GRANULOCYTES # BLD AUTO: 0.04 K/UL (ref 0–0.04)
IMM GRANULOCYTES # BLD AUTO: 0.05 K/UL (ref 0–0.04)
IMM GRANULOCYTES NFR BLD AUTO: 0.4 % (ref 0–0.5)
IMM GRANULOCYTES NFR BLD AUTO: 0.4 % (ref 0–0.5)
IMM GRANULOCYTES NFR BLD AUTO: 0.6 % (ref 0–0.5)
LYMPHOCYTES # BLD AUTO: 0.5 K/UL (ref 1–4.8)
LYMPHOCYTES # BLD AUTO: 0.8 K/UL (ref 1–4.8)
LYMPHOCYTES # BLD AUTO: 0.8 K/UL (ref 1–4.8)
LYMPHOCYTES NFR BLD: 10 % (ref 18–48)
LYMPHOCYTES NFR BLD: 10.1 % (ref 18–48)
LYMPHOCYTES NFR BLD: 4.4 % (ref 18–48)
MAGNESIUM SERPL-MCNC: 1.9 MG/DL (ref 1.6–2.6)
MCH RBC QN AUTO: 30 PG (ref 27–31)
MCH RBC QN AUTO: 30.6 PG (ref 27–31)
MCH RBC QN AUTO: 31 PG (ref 27–31)
MCHC RBC AUTO-ENTMCNC: 33.1 G/DL (ref 32–36)
MCHC RBC AUTO-ENTMCNC: 33.6 G/DL (ref 32–36)
MCHC RBC AUTO-ENTMCNC: 33.9 G/DL (ref 32–36)
MCV RBC AUTO: 91 FL (ref 82–98)
MCV RBC AUTO: 91 FL (ref 82–98)
MCV RBC AUTO: 92 FL (ref 82–98)
MONOCYTES # BLD AUTO: 0.2 K/UL (ref 0.3–1)
MONOCYTES # BLD AUTO: 0.8 K/UL (ref 0.3–1)
MONOCYTES # BLD AUTO: 0.9 K/UL (ref 0.3–1)
MONOCYTES NFR BLD: 10 % (ref 4–15)
MONOCYTES NFR BLD: 10.9 % (ref 4–15)
MONOCYTES NFR BLD: 2.2 % (ref 4–15)
NEUTROPHILS # BLD AUTO: 10.1 K/UL (ref 1.8–7.7)
NEUTROPHILS # BLD AUTO: 6.4 K/UL (ref 1.8–7.7)
NEUTROPHILS # BLD AUTO: 6.7 K/UL (ref 1.8–7.7)
NEUTROPHILS NFR BLD: 78.3 % (ref 38–73)
NEUTROPHILS NFR BLD: 79.5 % (ref 38–73)
NEUTROPHILS NFR BLD: 92.9 % (ref 38–73)
NRBC BLD-RTO: 0 /100 WBC
PHOSPHATE SERPL-MCNC: 3.3 MG/DL (ref 2.7–4.5)
PLATELET # BLD AUTO: 170 K/UL (ref 150–450)
PLATELET # BLD AUTO: 187 K/UL (ref 150–450)
PLATELET # BLD AUTO: 187 K/UL (ref 150–450)
PMV BLD AUTO: 9.4 FL (ref 9.2–12.9)
PMV BLD AUTO: 9.6 FL (ref 9.2–12.9)
PMV BLD AUTO: 9.9 FL (ref 9.2–12.9)
POTASSIUM SERPL-SCNC: 4.2 MMOL/L (ref 3.5–5.1)
PROT SERPL-MCNC: 4.9 G/DL (ref 6–8.4)
RBC # BLD AUTO: 2.48 M/UL (ref 4–5.4)
RBC # BLD AUTO: 2.71 M/UL (ref 4–5.4)
RBC # BLD AUTO: 2.87 M/UL (ref 4–5.4)
SODIUM SERPL-SCNC: 135 MMOL/L (ref 136–145)
WBC # BLD AUTO: 10.9 K/UL (ref 3.9–12.7)
WBC # BLD AUTO: 8.11 K/UL (ref 3.9–12.7)
WBC # BLD AUTO: 8.4 K/UL (ref 3.9–12.7)

## 2023-08-23 PROCEDURE — 97530 THERAPEUTIC ACTIVITIES: CPT

## 2023-08-23 PROCEDURE — 82330 ASSAY OF CALCIUM: CPT | Performed by: NURSE PRACTITIONER

## 2023-08-23 PROCEDURE — P9021 RED BLOOD CELLS UNIT: HCPCS

## 2023-08-23 PROCEDURE — 85025 COMPLETE CBC W/AUTO DIFF WBC: CPT

## 2023-08-23 PROCEDURE — 97535 SELF CARE MNGMENT TRAINING: CPT

## 2023-08-23 PROCEDURE — 25000003 PHARM REV CODE 250: Performed by: PHYSICIAN ASSISTANT

## 2023-08-23 PROCEDURE — 25000003 PHARM REV CODE 250

## 2023-08-23 PROCEDURE — 85025 COMPLETE CBC W/AUTO DIFF WBC: CPT | Mod: 91 | Performed by: NURSE PRACTITIONER

## 2023-08-23 PROCEDURE — 36430 TRANSFUSION BLD/BLD COMPNT: CPT

## 2023-08-23 PROCEDURE — 63600175 PHARM REV CODE 636 W HCPCS

## 2023-08-23 PROCEDURE — 97110 THERAPEUTIC EXERCISES: CPT

## 2023-08-23 PROCEDURE — 80053 COMPREHEN METABOLIC PANEL: CPT

## 2023-08-23 PROCEDURE — 51702 INSERT TEMP BLADDER CATH: CPT

## 2023-08-23 PROCEDURE — 94761 N-INVAS EAR/PLS OXIMETRY MLT: CPT

## 2023-08-23 PROCEDURE — 99233 SBSQ HOSP IP/OBS HIGH 50: CPT | Mod: FS,,, | Performed by: NURSE PRACTITIONER

## 2023-08-23 PROCEDURE — 97166 OT EVAL MOD COMPLEX 45 MIN: CPT

## 2023-08-23 PROCEDURE — 25000003 PHARM REV CODE 250: Performed by: NURSE PRACTITIONER

## 2023-08-23 PROCEDURE — 97162 PT EVAL MOD COMPLEX 30 MIN: CPT

## 2023-08-23 PROCEDURE — 84100 ASSAY OF PHOSPHORUS: CPT

## 2023-08-23 PROCEDURE — 87522 HEPATITIS C REVRS TRNSCRPJ: CPT | Performed by: PHYSICIAN ASSISTANT

## 2023-08-23 PROCEDURE — 83735 ASSAY OF MAGNESIUM: CPT

## 2023-08-23 PROCEDURE — 51798 US URINE CAPACITY MEASURE: CPT

## 2023-08-23 PROCEDURE — 20600001 HC STEP DOWN PRIVATE ROOM

## 2023-08-23 PROCEDURE — 99233 PR SUBSEQUENT HOSPITAL CARE,LEVL III: ICD-10-PCS | Mod: FS,,, | Performed by: NURSE PRACTITIONER

## 2023-08-23 RX ORDER — HYDROCODONE BITARTRATE AND ACETAMINOPHEN 500; 5 MG/1; MG/1
TABLET ORAL
Status: DISCONTINUED | OUTPATIENT
Start: 2023-08-23 | End: 2023-08-24

## 2023-08-23 RX ORDER — CALCIUM GLUCONATE 20 MG/ML
1 INJECTION, SOLUTION INTRAVENOUS
Status: COMPLETED | OUTPATIENT
Start: 2023-08-23 | End: 2023-08-24

## 2023-08-23 RX ORDER — DEXMEDETOMIDINE HYDROCHLORIDE 4 UG/ML
INJECTION, SOLUTION INTRAVENOUS
Status: DISPENSED
Start: 2023-08-23 | End: 2023-08-24

## 2023-08-23 RX ORDER — CALCIUM GLUCONATE 20 MG/ML
1 INJECTION, SOLUTION INTRAVENOUS
Status: DISCONTINUED | OUTPATIENT
Start: 2023-08-23 | End: 2023-08-28 | Stop reason: HOSPADM

## 2023-08-23 RX ORDER — SILODOSIN 4 MG/1
4 CAPSULE ORAL DAILY
Status: DISCONTINUED | OUTPATIENT
Start: 2023-08-23 | End: 2023-08-28 | Stop reason: HOSPADM

## 2023-08-23 RX ORDER — AMOXICILLIN 250 MG
1 CAPSULE ORAL 2 TIMES DAILY
Status: DISCONTINUED | OUTPATIENT
Start: 2023-08-23 | End: 2023-08-28 | Stop reason: HOSPADM

## 2023-08-23 RX ORDER — OXYCODONE HYDROCHLORIDE 5 MG/1
5 TABLET ORAL EVERY 6 HOURS PRN
Status: DISCONTINUED | OUTPATIENT
Start: 2023-08-23 | End: 2023-08-28 | Stop reason: HOSPADM

## 2023-08-23 RX ORDER — OXYCODONE HYDROCHLORIDE 10 MG/1
10 TABLET ORAL EVERY 6 HOURS PRN
Status: DISCONTINUED | OUTPATIENT
Start: 2023-08-23 | End: 2023-08-28 | Stop reason: HOSPADM

## 2023-08-23 RX ADMIN — METHOCARBAMOL 750 MG: 750 TABLET ORAL at 03:08

## 2023-08-23 RX ADMIN — CALCIUM GLUCONATE 1 G: 20 INJECTION, SOLUTION INTRAVENOUS at 05:08

## 2023-08-23 RX ADMIN — SENNOSIDES AND DOCUSATE SODIUM 1 TABLET: 50; 8.6 TABLET ORAL at 08:08

## 2023-08-23 RX ADMIN — SILODOSIN 4 MG: 4 CAPSULE ORAL at 08:08

## 2023-08-23 RX ADMIN — SODIUM CHLORIDE 500 ML: 9 INJECTION, SOLUTION INTRAVENOUS at 05:08

## 2023-08-23 RX ADMIN — METHOCARBAMOL 750 MG: 750 TABLET ORAL at 08:08

## 2023-08-23 RX ADMIN — ACETAMINOPHEN 650 MG: 325 TABLET ORAL at 11:08

## 2023-08-23 RX ADMIN — CALCIUM GLUCONATE 1 G: 98 INJECTION, SOLUTION INTRAVENOUS at 05:08

## 2023-08-23 RX ADMIN — OXYCODONE HYDROCHLORIDE 5 MG: 5 TABLET ORAL at 03:08

## 2023-08-23 RX ADMIN — OXYCODONE HYDROCHLORIDE 5 MG: 5 TABLET ORAL at 08:08

## 2023-08-23 RX ADMIN — GABAPENTIN 300 MG: 300 CAPSULE ORAL at 08:08

## 2023-08-23 RX ADMIN — METHOCARBAMOL 750 MG: 750 TABLET ORAL at 11:08

## 2023-08-23 NOTE — ASSESSMENT & PLAN NOTE
On omelsartan at home  -SBP goal <160, MAP >65 post op no increased MAP goals   -Holding due to lower end normal BP, post op setting  -PRN labetalol, hydralazine

## 2023-08-23 NOTE — PLAN OF CARE
"[facility] Care Plan    POC reviewed with Emilia Pablo and family at 0300. Pt verbalized understanding. Questions and concerns addressed. No acute events overnight. Pt progressing toward goals. Will continue to monitor. See below and flowsheets for full assessment and VS info.     Fentanyl and robaxin given for pain.  500cc NS bolus given  Calcium replaced  800 cc output from Hemovac drain         Is this a stroke patient? no    Neuro:  [place] Coma Scale  Best Eye Response: 4-->(E4) spontaneous  Best Motor Response: 6-->(M6) obeys commands  Best Verbal Response: 5-->(V5) oriented  Madai Coma Scale Score: 15  Assessment Qualifiers: patient not sedated/intubated  Pupil PERRLA: yes     24hr Temp:  [98 °F (36.7 °C)-98.5 °F (36.9 °C)]     CV:   Rhythm: normal sinus rhythm  BP goals:   SBP < 160  MAP > 65    Resp:           Plan: N/A    GI/:     Diet/Nutrition Received: NPO  Last Bowel Movement: 08/22/23  Voiding Characteristics: urethral catheter (bladder)    Intake/Output Summary (Last 24 hours) at 8/23/2023 0631  Last data filed at 8/23/2023 0600  Gross per 24 hour   Intake 6046.88 ml   Output 4210 ml   Net 1836.88 ml     Unmeasured Output  Stool Occurrence: 0    Labs/Accuchecks:  Recent Labs   Lab 08/23/23  0001   WBC 10.90   RBC 2.87*   HGB 8.6*   HCT 26.0*         Recent Labs   Lab 08/23/23  0001   *   K 4.2   CO2 17*   *   BUN 12   CREATININE 0.6   ALKPHOS 33*   ALT 16   AST 23   BILITOT 0.4      Recent Labs   Lab 08/22/23 2029   INR 1.1   APTT 23.1    No results for input(s): "CPK", "CPKMB", "TROPONINI", "MB" in the last 168 hours.    Electrolytes: Electrolytes replaced  Accuchecks: none    Gtts:   sodium chloride 0.9% Stopped (08/23/23 0533)       LDA/Wounds:  Lines/Drains/Airways       Drain  Duration                  Urethral Catheter 08/21/23 1530 Non-latex 16 Fr. 1 day         Closed/Suction Drain 08/22/23 1827 Midline Back Accordion <1 day              Arterial Line  Duration             " Arterial Line 08/22/23 1507 Right Radial <1 day              Peripheral Intravenous Line  Duration                  Peripheral IV - Single Lumen 08/21/23 1116 20 G Distal;Posterior;Right Forearm 1 day         Peripheral IV - Single Lumen 08/22/23 1504 14 G  Left Forearm <1 day         Peripheral IV - Single Lumen 08/22/23 1513 16 G Right Forearm <1 day                  Wounds: Yes  Wound care consulted: Yes

## 2023-08-23 NOTE — ASSESSMENT & PLAN NOTE
-Presented w/ new urinary retention  - Keep meza at least 24 hrs post op  - BUN/Cr 13/0.6. Follow daily CMP.

## 2023-08-23 NOTE — PROGRESS NOTES
[facility]  Neurocritical Care  Progress Note    Admit Date: 8/21/2023  Service Date: 08/23/2023  Length of Stay: 2    Subjective:     Chief Complaint: Thoracic spinal stenosis    History of Present Illness: 78F PHx significant for osteoporosis (Prolia), HTN, Abreu-Stickler syndrome, cervical and lumbosacral fusion (EJ, 2001 and 2008), presenting with one month of BLE weakness, urinary retention, gait instability and MRI C/T/L spine demonstrating moderate canal stenosis T10-T11 2/2 calcified disc herniation with cord signal change now s/p T10-T11 discectomy with T8-pelvis extension and revision of prior fusion. Patient has chronic L foot drop since original surgeries. Prakash was placed in ED due to retention.     Patient went to OR w/ NSGY 8/22 for T8-L2 fusion with T9-11 Rene osteotomies and T10/T11 facetectomies and discectomy from right sided approach. Admit to NCC post operatively.     Hospital Course: 08/23/2023: NAEON. Trending CBC. Hemodynamically stable. Low threshold to transfuse RBCs if becomes symptomatic. Discontinue Prakash and A line. Pain regimen adjusted.    Review of Systems: Review of Systems   Constitutional:  Positive for malaise/fatigue.   Respiratory:  Negative for cough, sputum production and shortness of breath.    Cardiovascular:  Negative for chest pain and palpitations.   Gastrointestinal:  Negative for heartburn, nausea and vomiting.   Genitourinary:         Retention   Neurological:  Positive for sensory change, focal weakness and weakness. Negative for dizziness, speech change and headaches.     Vitals:   Temp: 98.2 °F (36.8 °C)  Pulse: 73  Rhythm: normal sinus rhythm  BP: (!) 106/51  MAP (mmHg): 74  Resp: 18  SpO2: 100 %    Temp  Min: 98 °F (36.7 °C)  Max: 98.5 °F (36.9 °C)  Pulse  Min: 65  Max: 118  BP  Min: 91/51  Max: 130/58  MAP (mmHg)  Min: 67  Max: 86  Resp  Min: 13  Max: 61  SpO2  Min: 81 %  Max: 100 %    08/22 0701 - 08/23 0700  In: 6046.9 [I.V.:4450.1]  Out:  4210 [Urine:2930; Drains:880]   Unmeasured Output  Stool Occurrence: 0     Examination:   Constitutional: Well-nourished and -developed. No apparent distress.   Eyes: Conjunctiva clear, anicteric. Lids no lesions.  Head/Ears/Nose/Mouth/Throat/Neck: Moist mucous membranes. External ears, nose atraumatic.   Cardiovascular: Regular rhythm. No leg edema.  Respiratory: Comfortable respirations. Clear to auscultation.  Gastrointestinal: Soft, nondistended, nontender. + bowel sounds.    Neurologic:  -GCS E 4 V 5 M 6  -Alert. Oriented to person, place, and time. Speech fluent. Follows commands.  -Cranial nerves: EOM intact, PERRL, no facial droop, + cough  -Motor: BUE 5/5 strength, RLE 4/5, LLE 2/5, chronic L foot drop  -Sensation: Decreased to BLE    Medications:   Continuous Scheduledgabapentin, 300 mg, QHS  [START ON 8/24/2023] heparin (porcine), 5,000 Units, Q8H  senna-docusate 8.6-50 mg, 1 tablet, BID  silodosin, 4 mg, Daily    PRNacetaminophen, 650 mg, Q4H PRN  dextrose 10%, 12.5 g, PRN  dextrose 10%, 25 g, PRN  famotidine, 20 mg, Daily PRN  glucagon (human recombinant), 1 mg, PRN  glucose, 16 g, PRN  glucose, 16 g, PRN  glucose, 24 g, PRN  hydrALAZINE, 10 mg, Q6H PRN  labetalol, 10 mg, Q4H PRN  magnesium oxide, 800 mg, PRN  magnesium oxide, 800 mg, PRN  melatonin, 6 mg, Nightly PRN  methocarbamoL, 750 mg, Q8H PRN  ondansetron, 8 mg, Q6H PRN  oxyCODONE, 5 mg, Q6H PRN  oxyCODONE, 10 mg, Q6H PRN  potassium bicarbonate, 35 mEq, PRN  potassium bicarbonate, 50 mEq, PRN  potassium bicarbonate, 60 mEq, PRN  potassium, sodium phosphates, 2 packet, PRN  potassium, sodium phosphates, 2 packet, PRN  potassium, sodium phosphates, 2 packet, PRN       Today I independently reviewed pertinent medications, lines/drains/airways, imaging, cardiology results, laboratory results, microbiology results, notably:     ISTAT:   Recent Labs   Lab 08/22/23  1800   PH 7.361   PCO2 33.0*   PO2 278*   POCSATURATED 100   HCO3 18.7*   BE -7   POCNA  "141   POCK 3.8   POCTCO2 20*   POCGLU 84   POCICA 1.07   SAMPLE ARTERIAL      Chem:   Recent Labs   Lab 08/23/23  0001 08/23/23  0826   *  --    K 4.2  --    *  --    CO2 17*  --    *  --    BUN 12  --    CREATININE 0.6  --    CALCIUM 6.8*  --    CAION  --  0.91*   MG 1.9  --    PHOS 3.3  --    ANIONGAP 5*  --    PROT 4.9*  --    ALBUMIN 2.9*  --    BILITOT 0.4  --    ALKPHOS 33*  --    AST 23  --    ALT 16  --      Heme:   Recent Labs   Lab 08/22/23  2029 08/23/23  0001 08/23/23  1030   WBC 9.96   < > 8.40   HGB 9.0*   < > 8.3*   HCT 26.8*   < > 24.7*      < > 187   INR 1.1  --   --    FIBRINOGEN 238  --   --     < > = values in this interval not displayed.     Endo: No results for input(s): "POCTGLUCOSE" in the last 24 hours.       Assessment/Plan:     Neuro  * Thoracic spinal stenosis  78F PHx significant for osteoporosis (Prolia), HTN, Abreu-Stickler syndrome, cervical and lumbosacral fusion (EJ, 2001 and 2008), presenting with one month of BLE weakness, urinary retention, gait instability and MRI C/T/L spine demonstrating moderate canal stenosis T10-T11 2/2 calcified disc herniation with cord signal change now s/p T10-T11 discectomy with T8-pelvis extension and revision of prior fusion     -Admitted to [facility] for close monitoring in post op setting  -Neuro checks q1hr  -Vital signs q1hr  -NSGY following  -MAP goals > 65  -SBP goals < 160  -PRN labetalol, hydralazine  -No HOB restrictions  -Post op xrays pending  -PT/INR, aPTT pending  -CBC, CMP, Mag, Phos pending, then daily   -Pain control as appropriate   -Abx: ancef while drains in place per nsgy  -HV drains per NSGY  -PT/OT/SLP  -Repeat CBC stable, trend daily, low threshold to transfuse if symptomatic  -Stable to SD to NSGY team    Cardiac/Vascular  Hypertension  On omelsartan at home  -SBP goal <160, MAP >65 post op no increased MAP goals   -Holding due to lower end normal BP, post op setting  -PRN labetalol, hydralazine "     Renal/  Urinary retention  - Presented w/ new urinary retention  - Follow daily CMP  - Start silodosin, Prakash discontinued    Immunology/Multi System  Abreu-Stickler syndrome  Hx of    Endocrine  Age-related osteoporosis without current pathological fracture  Hx of,  On prolia at home    The patient is being Prophylaxed for:  Venous Thromboembolism with: Mechanical or Chemical  Stress Ulcer with: Not Applicable   Ventilator Pneumonia with: not applicable    Activity Orders          Turn patient every 2 hours starting at 08/23 1400    Diet Adult Regular (IDDSI Level 7): Regular starting at 08/23 0802    Up in chair With meals starting at 08/23 0700    Progressive Mobility Protocol (mobilize patient to their highest level of functioning at least twice daily) starting at 08/22 2005    Elevate HOB 30 starting at 08/22 2005    Ambulate With Assistance, Post Op Day 0 If the patient arrives from PACU by 4PM, walk at least once on day of operation if alert and safe to do so. starting at 08/22 2004        Full Code     Level III    Lynn Burns NP  Neurocritical Care  [facility]

## 2023-08-23 NOTE — NURSING
Patient arrived to [facility] from OR    Type of stroke/diagnosis:  Spinal Fusion (T8-L2)    Current symptoms: AAO x 4, moving everything, weaker in the lowers. Left foot drop. Pain 10/10    Skin Assessment done: Yes, wound vac, hvd present    Wounds noted: skin tear posteriorly on back of both knees.     Skin Assessment Verified by:  Otilio Membreno Completed? No pending     Patient Belongings on Admit: Black/white bag containing - bathroom bag, glasses, , toothbrush, panties, bra, nightgown,   Belonging bag - tennis shoes, panties, black dress    NCC notified: Prema GARCIA

## 2023-08-23 NOTE — PLAN OF CARE
"[facility] Care Plan    POC reviewed with Emilia Pablo and family at 1400. Pt verbalized understanding. Questions and concerns addressed. No acute events today. Pt progressing toward goals. Will continue to monitor. See below and flowsheets for full assessment and VS info.     PT has been hypotensive intermittently  NS bolus given  1 unit blood transfused  Tolerating regular diet  Meza removed, urinary rentention noted , new meza placed per order  Back dressings clean, dry, intact       Is this a stroke patient? no    Neuro:  [place] Coma Scale  Best Eye Response: 4-->(E4) spontaneous  Best Motor Response: 6-->(M6) obeys commands  Best Verbal Response: 5-->(V5) oriented  [place] Coma Scale Score: 15  Assessment Qualifiers: patient not sedated/intubated  Pupil PERRLA: yes     24 hr Temp:  [98 °F (36.7 °C)-98.5 °F (36.9 °C)]     CV:   Rhythm: normal sinus rhythm  BP goals:   SBP < 160  MAP > 65    Resp:           Plan: N/A    GI/:     Diet/Nutrition Received: regular  Last Bowel Movement: 08/22/23  Voiding Characteristics: ureteral catheter    Intake/Output Summary (Last 24 hours) at 8/23/2023 1838  Last data filed at 8/23/2023 1835  Gross per 24 hour   Intake 3226.88 ml   Output 2705 ml   Net 521.88 ml     Unmeasured Output  Stool Occurrence: 0    Labs/Accuchecks:  Recent Labs   Lab 08/23/23  1716   WBC 8.11   RBC 2.48*   HGB 7.7*   HCT 22.7*         Recent Labs   Lab 08/23/23  0001   *   K 4.2   CO2 17*   *   BUN 12   CREATININE 0.6   ALKPHOS 33*   ALT 16   AST 23   BILITOT 0.4      Recent Labs   Lab 08/22/23 2029   INR 1.1   APTT 23.1    No results for input(s): "CPK", "CPKMB", "TROPONINI", "MB" in the last 168 hours.    Electrolytes: N/A - electrolytes WDL  Accuchecks: none    Gtts:      LDA/Wounds:  Lines/Drains/Airways       Drain  Duration                  Closed/Suction Drain 08/22/23 1827 Midline Back Accordion 1 day         Urethral Catheter 08/23/23 1700 16 Fr. <1 day              " Peripheral Intravenous Line  Duration                  Peripheral IV - Single Lumen 08/21/23 1116 20 G Distal;Posterior;Right Forearm 2 days         Peripheral IV - Single Lumen 08/22/23 1504 14 G  Left Forearm 1 day         Peripheral IV - Single Lumen 08/22/23 1513 16 G Right Forearm 1 day                  Wounds: Yes  Wound care consulted: Yes    Problem: Infection  Goal: Absence of Infection Signs and Symptoms  Outcome: Ongoing, Progressing     Problem: Adult Inpatient Plan of Care  Goal: Plan of Care Review  Outcome: Ongoing, Progressing  Goal: Patient-Specific Goal (Individualized)  Outcome: Ongoing, Progressing  Goal: Absence of Hospital-Acquired Illness or Injury  Outcome: Ongoing, Progressing  Goal: Optimal Comfort and Wellbeing  Outcome: Ongoing, Progressing  Goal: Readiness for Transition of Care  Outcome: Ongoing, Progressing     Problem: Fall Injury Risk  Goal: Absence of Fall and Fall-Related Injury  Outcome: Ongoing, Progressing     Problem: Skin Injury Risk Increased  Goal: Skin Health and Integrity  Outcome: Ongoing, Progressing     Problem: Impaired Wound Healing  Goal: Optimal Wound Healing  Outcome: Ongoing, Progressing

## 2023-08-23 NOTE — PLAN OF CARE
POC established and functional mobility goals were created to help pt return to PLOF. Will be reassessed as appropriate to measure pt progress.    Problem: Physical Therapy  Goal: Physical Therapy Goal  Description: Goals to be met by: 23     Patient will increase functional independence with mobility by performin. Supine to sit with Stand-by Assistance  2. Sit to supine with Stand-by Assistance  3. Sit to stand transfer with Contact Guard Assistance  4. Bed to chair transfer with Contact Guard Assistance using LRAD as needed  5. Gait  x 150 feet with Contact Guard Assistance using LRAD as needed.   6. Lower extremity exercise program x15 reps per handout, with assistance as needed    Outcome: Ongoing, Progressing

## 2023-08-23 NOTE — PLAN OF CARE
OT evaluation completed, POC established  Problem: Occupational Therapy  Goal: Occupational Therapy Goal  Description: Goals to be met by: 9/23/23     Patient will increase functional independence with ADLs by performing:    UE Dressing with Stand-by Assistance.  LE Dressing with Stand-by Assistance.  Grooming while standing at sink with Stand-by Assistance.  Supine to sit with Supervision.  Step transfer with Stand-by Assistance  Toilet transfer to toilet with Stand-by Assistance.    Outcome: Ongoing, Progressing

## 2023-08-23 NOTE — BRIEF OP NOTE
[facility]  Brief Operative Note    SUMMARY     Surgery Date: 8/22/2023     Surgeon(s) and Role:     * Pedrito Chand MD - Primary     * Gina Burgess MD - Resident - Assisting        Pre-op Diagnosis:  Thoracic stenosis [M48.04]    Post-op Diagnosis:  Post-Op Diagnosis Codes:     * Thoracic stenosis [M48.04]    Procedure(s) (LRB):  FUSION, SPINE (N/A)    Anesthesia: General    Operative Findings: T8-L2 fusion with T9-11 Rene osteotomies and T10/T11 facetectomies and discectomy from right sided approach    Estimated Blood Loss: 400 mL    Estimated Blood Loss has been documented.         Specimens:   Specimen (24h ago, onward)      None            [id number]

## 2023-08-23 NOTE — ASSESSMENT & PLAN NOTE
78F PHx significant for osteoporosis (Prolia), HTN, Abreu-Stickler syndrome, cervical and lumbosacral fusion (EJ, 2001 and 2008), presenting with one month of BLE weakness, urinary retention, gait instability and MRI C/T/L spine demonstrating moderate canal stenosis T10-T11 2/2 calcified disc herniation with cord signal change now s/p T10-T11 discectomy with T8-pelvis extension and revision of prior fusion     -Admitted to [facility] for close monitoring in post op setting  -Neuro checks q1hr  -Vital signs q1hr  -NSGY following  -MAP goals > 65  -SBP goals < 160  -PRN labetalol, hydralazine  -No HOB restrictions  -Post op xrays pending  -PT/INR, aPTT pending  -CBC, CMP, Mag, Phos pending, then daily   -Pain control as appropriate   -Abx: ancef while drains in place per nsgy  -HV drains per NSGY  -PT/OT/SLP  -Repeat CBC stable, trend daily, low threshold to transfuse if symptomatic  -Stable to SD to NSGY team

## 2023-08-23 NOTE — TRANSFER OF CARE
"Anesthesia Transfer of Care Note    Patient: Emilia Pablo    Procedure(s) Performed: Procedure(s) (LRB):  FUSION, SPINE (N/A)    Patient location: PACU    Anesthesia Type: general    Transport from OR: Continuous ECG monitoring in transport. Continuous SpO2 monitoring in transport. Continuos invasive BP monitoring in transport. Transported from OR on 6-10 L/min O2 by face mask with adequate spontaneous ventilation    Post pain: adequate analgesia    Post assessment: no apparent anesthetic complications    Post vital signs: stable    Level of consciousness: sedated    Nausea/Vomiting: no nausea/vomiting    Complications: none    Transfer of care protocol was followed      Last vitals:   Visit Vitals  BP (!) 144/68 (BP Location: Right arm, Patient Position: Lying)   Pulse 87   Temp 36.7 °C (98.1 °F) (Skin)   Resp 16   Ht 5' 4" (1.626 m)   Wt 68.5 kg (151 lb 0.2 oz)   SpO2 99%   Breastfeeding No   BMI 25.92 kg/m²     "

## 2023-08-23 NOTE — PLAN OF CARE
[facility]  Discharge Reassessment    Primary Care Provider: Dave Gibson MD    Expected Discharge Date: 08/26/2023    Per MD:  8/23: POD 1 s/p T10-11 discectomy w T8-L2 fusion.  Per therapy, patient unable to participate fully today due to hypotension.  CM will follow for needs.     Reassessment (most recent)       Discharge Reassessment - 08/23/23 1603          Discharge Reassessment    Assessment Type Discharge Planning Reassessment     Did the patient's condition or plan change since previous assessment? No     Communicated DAPHNEY with patient/caregiver Date not available/Unable to determine     Discharge Plan A Home with family;Home Health     Discharge Plan B Rehab     DME Needed Upon Discharge  other (see comments)   tbd    Transition of Care Barriers None     Why the patient remains in the hospital Requires continued medical care                   Natalia Oneill, RN, CCRN-K, [facility]  Neuro-Critical Care   [phone]

## 2023-08-23 NOTE — SUBJECTIVE & OBJECTIVE
Review of Systems: Review of Systems   Constitutional:  Positive for malaise/fatigue.   Respiratory:  Negative for cough, sputum production and shortness of breath.    Cardiovascular:  Negative for chest pain and palpitations.   Gastrointestinal:  Negative for heartburn, nausea and vomiting.   Genitourinary:         Retention   Neurological:  Positive for sensory change, focal weakness and weakness. Negative for dizziness, speech change and headaches.     Vitals:   Temp: 98.2 °F (36.8 °C)  Pulse: 73  Rhythm: normal sinus rhythm  BP: (!) 106/51  MAP (mmHg): 74  Resp: 18  SpO2: 100 %    Temp  Min: 98 °F (36.7 °C)  Max: 98.5 °F (36.9 °C)  Pulse  Min: 65  Max: 118  BP  Min: 91/51  Max: 130/58  MAP (mmHg)  Min: 67  Max: 86  Resp  Min: 13  Max: 61  SpO2  Min: 81 %  Max: 100 %    08/22 0701 - 08/23 0700  In: 6046.9 [I.V.:4450.1]  Out: 4210 [Urine:2930; Drains:880]   Unmeasured Output  Stool Occurrence: 0     Examination:   Constitutional: Well-nourished and -developed. No apparent distress.   Eyes: Conjunctiva clear, anicteric. Lids no lesions.  Head/Ears/Nose/Mouth/Throat/Neck: Moist mucous membranes. External ears, nose atraumatic.   Cardiovascular: Regular rhythm. No leg edema.  Respiratory: Comfortable respirations. Clear to auscultation.  Gastrointestinal: Soft, nondistended, nontender. + bowel sounds.    Neurologic:  -GCS E 4 V 5 M 6  -Alert. Oriented to person, place, and time. Speech fluent. Follows commands.  -Cranial nerves: EOM intact, PERRL, no facial droop, + cough  -Motor: BUE 5/5 strength, RLE 4/5, LLE 2/5, chronic L foot drop  -Sensation: Decreased to BLE    Medications:   Continuous Scheduledgabapentin, 300 mg, QHS  [START ON 8/24/2023] heparin (porcine), 5,000 Units, Q8H  senna-docusate 8.6-50 mg, 1 tablet, BID  silodosin, 4 mg, Daily    PRNacetaminophen, 650 mg, Q4H PRN  dextrose 10%, 12.5 g, PRN  dextrose 10%, 25 g, PRN  famotidine, 20 mg, Daily PRN  glucagon (human recombinant), 1 mg, PRN  glucose, 16  "g, PRN  glucose, 16 g, PRN  glucose, 24 g, PRN  hydrALAZINE, 10 mg, Q6H PRN  labetalol, 10 mg, Q4H PRN  magnesium oxide, 800 mg, PRN  magnesium oxide, 800 mg, PRN  melatonin, 6 mg, Nightly PRN  methocarbamoL, 750 mg, Q8H PRN  ondansetron, 8 mg, Q6H PRN  oxyCODONE, 5 mg, Q6H PRN  oxyCODONE, 10 mg, Q6H PRN  potassium bicarbonate, 35 mEq, PRN  potassium bicarbonate, 50 mEq, PRN  potassium bicarbonate, 60 mEq, PRN  potassium, sodium phosphates, 2 packet, PRN  potassium, sodium phosphates, 2 packet, PRN  potassium, sodium phosphates, 2 packet, PRN       Today I independently reviewed pertinent medications, lines/drains/airways, imaging, cardiology results, laboratory results, microbiology results, notably:     ISTAT:   Recent Labs   Lab 08/22/23  1800   PH 7.361   PCO2 33.0*   PO2 278*   POCSATURATED 100   HCO3 18.7*   BE -7   POCNA 141   POCK 3.8   POCTCO2 20*   POCGLU 84   POCICA 1.07   SAMPLE ARTERIAL      Chem:   Recent Labs   Lab 08/23/23  0001 08/23/23  0826   *  --    K 4.2  --    *  --    CO2 17*  --    *  --    BUN 12  --    CREATININE 0.6  --    CALCIUM 6.8*  --    CAION  --  0.91*   MG 1.9  --    PHOS 3.3  --    ANIONGAP 5*  --    PROT 4.9*  --    ALBUMIN 2.9*  --    BILITOT 0.4  --    ALKPHOS 33*  --    AST 23  --    ALT 16  --      Heme:   Recent Labs   Lab 08/22/23 2029 08/23/23  0001 08/23/23  1030   WBC 9.96   < > 8.40   HGB 9.0*   < > 8.3*   HCT 26.8*   < > 24.7*      < > 187   INR 1.1  --   --    FIBRINOGEN 238  --   --     < > = values in this interval not displayed.     Endo: No results for input(s): "POCTGLUCOSE" in the last 24 hours.     "

## 2023-08-23 NOTE — SUBJECTIVE & OBJECTIVE
Past Medical History:   Diagnosis Date    Arthritis     osteoarthritis    Hypertension     Abreu-Stickler syndrome      Past Surgical History:   Procedure Laterality Date    acdf      2 level    BACK SURGERY      laminectomy    BACK SURGERY      lumbar fusion L1-S1    CARDIAC CATHETERIZATION  2016    Normal Dr Wright; see media note; normal     SECTION      x2    EYE SURGERY Bilateral     laser   -   Nano's     JOINT REPLACEMENT Left 2013    JOINT REPLACEMENT Right 5/11/15    TONSILLECTOMY        No current facility-administered medications on file prior to encounter.     Current Outpatient Medications on File Prior to Encounter   Medication Sig Dispense Refill    acetaminophen (TYLENOL) 650 MG TbSR Take 650 mg by mouth every 8 (eight) hours as needed (pain).      b complex vitamins capsule Take 1 capsule by mouth once daily.      celecoxib (CELEBREX) 200 MG capsule TAKE 1 CAPSULE EVERY DAY 90 capsule 3    denosumab (PROLIA) 60 mg/mL Syrg Inject 60 mg into the skin every 6 (six) months.      famotidine (PEPCID ORAL) Take 1 tablet by mouth daily as needed (heartburn).      gabapentin (NEURONTIN) 300 MG capsule 2 nightly and one during day as needed for nerve pain (Patient taking differently: Take 300 mg by mouth every evening.) 270 capsule 3    glucosamine-chondroitin 500-400 mg tablet Take 1 tablet by mouth 2 (two) times daily.      loratadine (CLARITIN) 10 mg tablet Take 10 mg by mouth daily as needed for Allergies.      olmesartan (BENICAR) 5 MG Tab TAKE 1 TABLET EVERY DAY 90 tablet 3    oxymetazoline (AFRIN) 0.05 % nasal spray 2 sprays by Nasal route daily as needed for Congestion.      vitamin D 1000 units Tab Take 2,000 Units by mouth once daily.      fish oil-omega-3 fatty acids 300-1,000 mg capsule Take 2 g by mouth once daily.        Allergies: Patient has no known allergies.    Family History   Problem Relation Age of Onset    Crohn's disease Son     Stickler syndrome Son      Diabetes Mother     Heart disease Mother     Arthritis Mother     Cancer Sister         melanoma    Heart disease Brother     Heart disease Sister     No Known Problems Son      Social History     Tobacco Use    Smoking status: Never     Passive exposure: Never    Smokeless tobacco: Never   Substance Use Topics    Alcohol use: Yes     Comment: occasional     Review of Systems   Unable to perform ROS: Mental status change (post op- coming out of anesthesia)   Constitutional:  Negative for chills and fever.   HENT:  Positive for sore throat. Negative for trouble swallowing.    Eyes:  Negative for photophobia and visual disturbance.   Respiratory:  Negative for cough and shortness of breath.    Cardiovascular:  Negative for chest pain and palpitations.   Gastrointestinal:  Negative for nausea and vomiting.   Genitourinary:  Positive for difficulty urinating (meza in place). Negative for enuresis.   Musculoskeletal:  Positive for back pain. Negative for neck pain.   Skin:  Positive for wound (incisin). Negative for rash.   Neurological:  Positive for speech difficulty (minimall slurred speech improving during exam) and weakness (LLE foot drop).   Psychiatric/Behavioral:  Positive for confusion. Negative for agitation.      Objective:     Vitals:    Temp: 98.1 °F (36.7 °C)  Pulse: 87  Rhythm: normal sinus rhythm  BP: (!) 144/68  MAP (mmHg): 98  Resp: 16  SpO2: 99 %    Temp  Min: 97.5 °F (36.4 °C)  Max: 98.5 °F (36.9 °C)  Pulse  Min: 60  Max: 89  BP  Min: 124/66  Max: 152/67  MAP (mmHg)  Min: 87  Max: 98  Resp  Min: 15  Max: 18  SpO2  Min: 98 %  Max: 100 %    08/21 0701 - 08/22 0700  In: -   Out: 1800 [Urine:1800]   Unmeasured Output  Stool Occurrence: 0        Physical Exam    Constitutional: Well-nourished, well-developed. Appears stated age.   No obvious distress. Laying flat in bed w/ O2 mask on, taken off and remains at 100% O2 sat on RA. Anesthesia and NSGY resident at bedside.   Patient lying in bed comfortably.      Eyes: Clear conjunctiva. Anicteric. No discharge.   Lids without lesions.    HEENT: Normocephalic, atraumatic.   Nose and external ears atraumatic.   Mucus membranes are moist.     Cardio: Regular rate and rhythm on telemetry monitor.    Respiratory: Regular effort, no respiratory distress.    GI: Soft, non-distended, non-tender.    Skin: No erythema, rash, or wounds to exposed skin. C/d/I bandages posterior of R upper leg. No surrounding erythema or apparent blood/drainage.     Neuro:   Sedation: Recent anesthesia   E3 V4 M6    Eyes open briskly to voice. Patient w/ slurred but improving speech, answering questions, but saying she is unable to move legs, but starting to move legs. Patient is answering all questions and following all commands briskly.   No facial droop. EOMI. PERRL.     Motor:   RUE: 5/5, moving spontaneously and purposefully   LUE: 5/5, moving spontaneously and purposefully   Bilat LEs: Initially not moving, but within 30 min patient able to withdrawal from noxious stimuli R faster than L     Sensory:  Sensation grossly intact    Unable to test gait due to level of consciousness.       Today I personally reviewed pertinent medications, lines/drains/airways, imaging, cardiology results, laboratory results, notably: CBC, CMP, coags, Imaging

## 2023-08-23 NOTE — H&P
[facility]  Neurocritical Care  History & Physical    Admit Date: 2023  Service Date: 2023  Length of Stay: 1    Subjective:     Chief Complaint: Thoracic spinal stenosis    History of Present Illness: 78F PHx significant for osteoporosis (Prolia), HTN, Abreu-Stickler syndrome, cervical and lumbosacral fusion (EJ,  and ), presenting with one month of BLE weakness, urinary retention, gait instability and MRI C/T/L spine demonstrating moderate canal stenosis T10-T11 2/2 calcified disc herniation with cord signal change now s/p T10-T11 discectomy with T8-pelvis extension and revision of prior fusion. Patient has chronic L foot drop since original surgeries. Prakash was placed in ED due to retention.     Patient went to OR w/ NSGY  for T8-L2 fusion with T9-11 Rene osteotomies and T10/T11 facetectomies and discectomy from right sided approach. Admit to NCC post operatively.         Past Medical History:   Diagnosis Date    Arthritis     osteoarthritis    Hypertension     Abreu-Stickler syndrome      Past Surgical History:   Procedure Laterality Date    acdf      2 level    BACK SURGERY      laminectomy    BACK SURGERY      lumbar fusion L1-S1    CARDIAC CATHETERIZATION  2016    Normal Dr Wright; see media note; normal     SECTION      x2    EYE SURGERY Bilateral     laser   -   Nano's     JOINT REPLACEMENT Left 2013    JOINT REPLACEMENT Right 5/11/15    TONSILLECTOMY        No current facility-administered medications on file prior to encounter.     Current Outpatient Medications on File Prior to Encounter   Medication Sig Dispense Refill    acetaminophen (TYLENOL) 650 MG TbSR Take 650 mg by mouth every 8 (eight) hours as needed (pain).      b complex vitamins capsule Take 1 capsule by mouth once daily.      celecoxib (CELEBREX) 200 MG capsule TAKE 1 CAPSULE EVERY DAY 90 capsule 3    denosumab (PROLIA) 60 mg/mL Syrg Inject 60 mg  into the skin every 6 (six) months.      famotidine (PEPCID ORAL) Take 1 tablet by mouth daily as needed (heartburn).      gabapentin (NEURONTIN) 300 MG capsule 2 nightly and one during day as needed for nerve pain (Patient taking differently: Take 300 mg by mouth every evening.) 270 capsule 3    glucosamine-chondroitin 500-400 mg tablet Take 1 tablet by mouth 2 (two) times daily.      loratadine (CLARITIN) 10 mg tablet Take 10 mg by mouth daily as needed for Allergies.      olmesartan (BENICAR) 5 MG Tab TAKE 1 TABLET EVERY DAY 90 tablet 3    oxymetazoline (AFRIN) 0.05 % nasal spray 2 sprays by Nasal route daily as needed for Congestion.      vitamin D 1000 units Tab Take 2,000 Units by mouth once daily.      fish oil-omega-3 fatty acids 300-1,000 mg capsule Take 2 g by mouth once daily.        Allergies: Patient has no known allergies.    Family History   Problem Relation Age of Onset    Crohn's disease Son     Stickler syndrome Son     Diabetes Mother     Heart disease Mother     Arthritis Mother     Cancer Sister         melanoma    Heart disease Brother     Heart disease Sister     No Known Problems Son      Social History     Tobacco Use    Smoking status: Never     Passive exposure: Never    Smokeless tobacco: Never   Substance Use Topics    Alcohol use: Yes     Comment: occasional     Review of Systems   Unable to perform ROS: Mental status change (post op- coming out of anesthesia)   Constitutional:  Negative for chills and fever.   HENT:  Positive for sore throat. Negative for trouble swallowing.    Eyes:  Negative for photophobia and visual disturbance.   Respiratory:  Negative for cough and shortness of breath.    Cardiovascular:  Negative for chest pain and palpitations.   Gastrointestinal:  Negative for nausea and vomiting.   Genitourinary:  Positive for difficulty urinating (meza in place). Negative for enuresis.   Musculoskeletal:  Positive for back pain. Negative for neck pain.    Skin:  Positive for wound (incisin). Negative for rash.   Neurological:  Positive for speech difficulty (minimall slurred speech improving during exam) and weakness (LLE foot drop).   Psychiatric/Behavioral:  Positive for confusion. Negative for agitation.      Objective:     Vitals:    Temp: 98.1 °F (36.7 °C)  Pulse: 87  Rhythm: normal sinus rhythm  BP: (!) 144/68  MAP (mmHg): 98  Resp: 16  SpO2: 99 %    Temp  Min: 97.5 °F (36.4 °C)  Max: 98.5 °F (36.9 °C)  Pulse  Min: 60  Max: 89  BP  Min: 124/66  Max: 152/67  MAP (mmHg)  Min: 87  Max: 98  Resp  Min: 15  Max: 18  SpO2  Min: 98 %  Max: 100 %    08/21 0701 - 08/22 0700  In: -   Out: 1800 [Urine:1800]   Unmeasured Output  Stool Occurrence: 0        Physical Exam    Constitutional: Well-nourished, well-developed. Appears stated age.   No obvious distress. Laying flat in bed w/ O2 mask on, taken off and remains at 100% O2 sat on RA. Anesthesia and NSGY resident at bedside.   Patient lying in bed comfortably.     Eyes: Clear conjunctiva. Anicteric. No discharge.   Lids without lesions.    HEENT: Normocephalic, atraumatic.   Nose and external ears atraumatic.   Mucus membranes are moist.     Cardio: Regular rate and rhythm on telemetry monitor.    Respiratory: Regular effort, no respiratory distress.    GI: Soft, non-distended, non-tender.    Skin: No erythema, rash, or wounds to exposed skin. C/d/I bandages posterior of R upper leg. No surrounding erythema or apparent blood/drainage.     Neuro:   Sedation: Recent anesthesia   E3 V4 M6    Eyes open briskly to voice. Patient w/ slurred but improving speech, answering questions, but saying she is unable to move legs, but starting to move legs. Patient is answering all questions and following all commands briskly.   No facial droop. EOMI. PERRL.     Motor:   RUE: 5/5, moving spontaneously and purposefully   LUE: 5/5, moving spontaneously and purposefully   Bilat LEs: Initially not moving, but within 30 min patient able to  withdrawal from noxious stimuli R faster than L     Sensory:  Sensation grossly intact    Unable to test gait due to level of consciousness.       Today I personally reviewed pertinent medications, lines/drains/airways, imaging, cardiology results, laboratory results, notably: CBC, CMP, coags, Imaging       Assessment/Plan:     Neuro  * Thoracic spinal stenosis  78F PHx significant for osteoporosis (Prolia), HTN, Abreu-Stickler syndrome, cervical and lumbosacral fusion (EJ, 2001 and 2008), presenting with one month of BLE weakness, urinary retention, gait instability and MRI C/T/L spine demonstrating moderate canal stenosis T10-T11 2/2 calcified disc herniation with cord signal change now s/p T10-T11 discectomy with T8-pelvis extension and revision of prior fusion       -Admit to NCC for close monitoring in post op setting  -Neuro checks q1hr  -Vital signs q1hr  -NSGY following  -MAP goals >65  -SBP goals <160  -PRN labetalol, hydralazine  -No HOB restrictions  -Post op xrays pending  - PT/INR, aPTT pending  -CBC, CMP, Mag, Phos pending, then daily   -Pain control as appropriate   - Abx: ancef while drains in place per nsgy  -HV drains per NSGY  -PT/OT/SLP    Cardiac/Vascular  Hypertension  On omelsartan at home  -SBP goal <160, MAP >65 post op no increased MAP goals   -restart home antihypertensives at home   -PRN labetalol, hydralazine       Renal/  Urinary retention  -Presented w/ new urinary retention  - Keep meza at least 24 hrs post op  - BUN/Cr 13/0.6. Follow daily CMP.          Immunology/Multi System  Abreu-Stickler syndrome  Hx of    Endocrine  Age-related osteoporosis without current pathological fracture  Hx of,  On prolia at home          The patient is being Prophylaxed for:  Venous Thromboembolism with: Mechanical  Stress Ulcer with: Not Applicable   Ventilator Pneumonia with: not applicable    Activity Orders          Up in chair With meals starting at 08/23 0700    Progressive Mobility Protocol  (mobilize patient to their highest level of functioning at least twice daily) starting at 08/22 2005    Elevate HOB 30 starting at 08/22 2005    Ambulate With Assistance, Post Op Day 0 If the patient arrives from PACU by 4PM, walk at least once on day of operation if alert and safe to do so. starting at 08/22 2004    Diet NPO: NPO starting at 08/22 0001        Full Code     Critical condition in that Patient has a condition that poses threat to life and bodily function: Immediate post op period for T8-L2 fusion and T9-11 lami for bulging disc, following for anemia, change in motor exam, and MAPs.      35 minutes of Critical care time was spent personally by me on the following activities: development of treatment plan with patient or surrogate and bedside caregivers, discussions with consultants, evaluation of patient's response to treatment, examination of patient, ordering and performing treatments and interventions, ordering and review of laboratory studies, ordering and review of radiographic studies, pulse oximetry, antibiotic titration if applicable, vasopressor titration if applicable, re-evaluation of patient's condition. This critical care time did not overlap with that of any other provider or involve time for any procedures. There is high probability for acute neurological change leading to clinical and possibly life-threatening deterioration requiring highest level of physician preparedness for urgent intervention.      Prema Roberts PA-C  Neurocritical Care  [facility]

## 2023-08-23 NOTE — HOSPITAL COURSE
08/23/2023: NAEON. Trending CBC. Hemodynamically stable. Low threshold to transfuse RBCs if becomes symptomatic. Discontinue Prakash and A line. Pain regimen adjusted.  08/24/2023: H&H remains less than 8 despite 1 unit PRBC yesterday. Patient with orthostatic hypotension when working with PT/OT. 2 additional units ordered. Albumin x 1. Prakash in place for retention.  8/25/2023 NAEO, drain output slowed. H/H stable and vitals normalized. SD to NSGY today

## 2023-08-23 NOTE — ASSESSMENT & PLAN NOTE
Emilia Pablo is a 78 y.o. female with PMH of osteoporosis- on Prolia, HTN, Abreu-Stickler Syndrome, worsening BLE weakness x 3 months who presents for evaluation of acute myelopathy.     - MRI cervical and thoracic spine shows moderate canal stenosis C4-5, T10-11 2/2 calcified disc herniation with cord signal change, multilevel foraminal stenosis.   - MRI lumbar spine with stable lumbar collection in subcutaneous tissues. No significant central stenosis. Prior MRI L spine 8/2/2023 limited evaluation 2/2 hardware artifact, but without severe canal stenosis.      POD1 T10-11 discectomy and extension/revision of prior fusion to T8-pelvis.   - continue tx for orthostasis  - f/u thoracolumbar xray  - stable for transfer to floor  - drain to gravity

## 2023-08-23 NOTE — PROGRESS NOTES
[facility]  Neurosurgery  Progress Note    Subjective:     History of Present Illness: Emilia Pablo is a 78 y.o. female with PMH of osteoporosis- on Prolia, HTN, and Abreu-Stickler Syndrome. She has a surgical hx of 2001 cervical fusion and 2008 lumbosacral fusion - EJ - did well from surgery, chronic foot drop on left since surgery. She was evaluated by Dr. Aminta FARRIS one month ago for weakness, gait imbalance and LLE weakness and the patient was referred to Dr. Chand for further evaluation. She was not able to complete the outpatient workup due to new neuro deficits. Reports a fall trying to transfer from the chair to the toilet. She denies hitting her head/back or LOC. Reports since then she noticed worsening weakness RLE>LLE and some bowel incontinence. She feels the urge to have a BM but reports episodes of incontinence. Also notes urinary urgency x 2 weeks. Prakash placed in ED due to retention, bladder scan 660cc without the urge to void. She has nondermatomal patchy numbness in the BLE in the feet and thighs. Denies saddle anesthesia, UE weakness or numbness. NSGY consulted for evaluation.       Post-Op Info:  Procedure(s) (LRB):  FUSION, SPINE (N/A)   1 Day Post-Op     Interval History: 8/23: POD 1 s/p T10-11 discectomy w T8-L2 fusion. NAEON    Medications:  Continuous Infusions:      Scheduled Meds:   gabapentin  300 mg Oral QHS    [START ON 8/24/2023] heparin (porcine)  5,000 Units Subcutaneous Q8H    senna-docusate 8.6-50 mg  1 tablet Oral BID    silodosin  4 mg Oral Daily     PRN Meds:acetaminophen, dextrose 10%, dextrose 10%, famotidine, glucagon (human recombinant), glucose, glucose, glucose, hydrALAZINE, labetalol, magnesium oxide, magnesium oxide, melatonin, methocarbamoL, ondansetron, oxyCODONE, oxyCODONE, potassium bicarbonate, potassium bicarbonate, potassium bicarbonate, potassium, sodium phosphates, potassium, sodium phosphates, potassium, sodium phosphates     Review  "of Systems  Objective:     Weight: 68.5 kg (151 lb 0.2 oz)  Body mass index is 25.92 kg/m².  Vital Signs (Most Recent):  Temp: 98.2 °F (36.8 °C) (08/23/23 1101)  Pulse: 73 (08/23/23 1401)  Resp: 18 (08/23/23 1532)  BP: (!) 106/51 (08/23/23 1401)  SpO2: 100 % (08/23/23 1401) Vital Signs (24h Range):  Temp:  [98 °F (36.7 °C)-98.5 °F (36.9 °C)] 98.2 °F (36.8 °C)  Pulse:  [] 73  Resp:  [13-61] 18  SpO2:  [81 %-100 %] 100 %  BP: ()/(46-70) 106/51  Arterial Line BP: ()/(34-59) 72/59     Date 08/23/23 0700 - 08/24/23 0659   Shift 8428-7616 5790-0842 8958-8578 24 Hour Total   INTAKE   P.O. 180   180   Shift Total(mL/kg) 180(2.6)   180(2.6)   OUTPUT   Urine(mL/kg/hr) 100(0.2)   100   Drains 125   125   Shift Total(mL/kg) 225(3.3)   225(3.3)   Weight (kg) 68.5 68.5 68.5 68.5                            Urethral Catheter 08/21/23 1530 Non-latex 16 Fr. (Active)   Site Assessment Clean;Intact 08/22/23 0200   Collection Container Urimeter 08/22/23 0200   Securement Method secured to top of thigh w/ adhesive device 08/22/23 0200   Catheter Care Performed yes 08/22/23 0600   Reason for Continuing Urinary Catheterization Urinary retention 08/22/23 0200   CAUTI Prevention Bundle Securement Device in place with 1" slack;Intact seal between catheter & drainage tubing;Drainage bag/urimeter off the floor;Sheeting clip in use;No dependent loops or kinks;Drainage bag/urimeter not overfilled (<2/3 full);Drainage bag/urimeter below bladder 08/21/23 2355   Output (mL) 1000 mL 08/22/23 0538         Physical Exam         Neurosurgery Physical Exam  General: well developed, well nourished, no distress.   Head: normocephalic, atraumatic  Neurologic: Alert and oriented. Thought content appropriate.  GCS: Motor: 6/Verbal: 5/Eyes: 4 GCS Total: 15  Mental Status: Awake, Alert, Oriented x 4  Language: No aphasia  Speech: No dysarthria  Cranial nerves: face symmetric, tongue midline, CN II-XII grossly intact, EOMI.   Pulmonary: normal " respirations, no signs of respiratory distress  Abdomen: soft, non-distended, not tender to palpation  Sensory: intact to light touch throughout  Motor Strength:   BUE 5/5  LLE 3/5 HF/KF/KE/EHL, 0/5 DF/PF.   RLE 4- prox, 4+ ke, 5 df/pf.   no anai/clon  Vascular: No LE edema.   Skin: Skin is warm, dry and intact.    Significant Labs:  Recent Labs   Lab 08/22/23 0522 08/22/23 2029 08/23/23  0001   GLU 89 118* 132*    137 135*   K 4.4 4.0 4.2    112* 113*   CO2 24 19* 17*   BUN 13 11 12   CREATININE 0.6 0.6 0.6   CALCIUM 8.7 7.0* 6.8*   MG  --  2.0 1.9       Recent Labs   Lab 08/22/23 2029 08/23/23  0001 08/23/23  1030   WBC 9.96 10.90 8.40   HGB 9.0* 8.6* 8.3*   HCT 26.8* 26.0* 24.7*    187 187       Recent Labs   Lab 08/22/23 2029   INR 1.1   APTT 23.1       Microbiology Results (last 7 days)       ** No results found for the last 168 hours. **          All pertinent labs from the last 24 hours have been reviewed.    Significant Diagnostics:  MRI Lumbar Spine Without Contrast    Result Date: 8/22/2023  Postoperative changes L1 through S1 posterior instrumented fusion.  There is susceptibility artifact which limits evaluation. Multilevel degenerative changes as above, noting susceptibility artifact limits evaluation of the neural foramina at multiple levels.  Findings most significantly contribute to severe left neural foraminal narrowing at L5-S1.  No high-grade canal stenosis. Stable fluid collection in the subcutaneous tissues at the operative bed. Additional findings discussed above and identified on same day thoracic spine MRI. Electronically signed by resident: Juan Wright Date:    08/22/2023 Time:    00:09 Electronically signed by: Ayush Back MD Date:    08/22/2023 Time:    01:15    US Abdomen Complete    Result Date: 8/22/2023  Hepatic cyst in the left lobe which correlates with MRI T-spine from same date.  No suspicious focal liver lesions. Cholelithiasis.  Mild gallbladder wall  thickening without other convincing signs of acute cholecystitis. Left kidney simple cyst. Electronically signed by resident: Juan Wright Date:    08/21/2023 Time:    23:40 Electronically signed by: Ayush Back MD Date:    08/22/2023 Time:    00:20    US Lower Extremity Veins Bilateral    Result Date: 8/21/2023  1. No evidence of DVT bilaterally. 2. Baker cyst in the right popliteal fossa. Electronically signed by: Armando Camarillo Date:    08/21/2023 Time:    23:11    X-Ray Chest AP Portable    Result Date: 8/21/2023  1. No convincing acute cardiopulmonary process noting coarse interstitial attenuation suggests underlying COPD/emphysema.  Correlation is advised. Electronically signed by: Jovan Cartagena MD Date:    08/21/2023 Time:    19:28    CT Lumbar Spine Without Contrast    Result Date: 8/21/2023  1. High attenuating lobular focus lies posterior to the T10 vertebral body, correlating with low signal focus on previous MRI.  This is suspected to reflect herniated nucleus polyposis in the setting of advanced degenerative disc disease as there is vacuum disc phenomenon at the T10-T11 disc space.  Although there is a punctate focus of air in the region, this is likely related to vacuum disc phenomenon extending from the joint space and left facet rather than infectious process.  No significant soft tissue edema in the region to support a diagnosis of infection.  Correlation however is advised, and correlation with patient's symptomatology and findings on prior MRI advised. 2. Evolving surgical changes of the lumbar spine as described noting resolution of prior postoperative fluid collection.  No definite fracture although evaluation for the same is significantly limited given the extent of motion artifact. 3. Please see above for several additional findings. Electronically signed by: Jovan Cartagena MD Date:    08/21/2023 Time:    18:41    CT Thoracic Spine Without Contrast    Result Date: 8/21/2023  1. High  attenuating lobular focus lies posterior to the T10 vertebral body, correlating with low signal focus on previous MRI.  This is suspected to reflect herniated nucleus polyposis in the setting of advanced degenerative disc disease as there is vacuum disc phenomenon at the T10-T11 disc space.  Although there is a punctate focus of air in the region, this is likely related to vacuum disc phenomenon extending from the joint space and left facet rather than infectious process.  No significant soft tissue edema in the region to support a diagnosis of infection.  Correlation however is advised, and correlation with patient's symptomatology and findings on prior MRI advised. 2. Evolving surgical changes of the lumbar spine as described noting resolution of prior postoperative fluid collection.  No definite fracture although evaluation for the same is significantly limited given the extent of motion artifact. 3. Please see above for several additional findings. Electronically signed by: Jovan Cartagena MD Date:    08/21/2023 Time:    18:41    MRI Cervical Spine Without Contrast    Result Date: 8/21/2023  1. Postoperative change of ACDF at C5-C7. 2. Multilevel degenerative changes detailed above.  Severe spinal canal stenosis noted at C3-C4, moderate at C2-C3, C4-C5, and C6-C7.  Moderate to severe neural foraminal narrowing noted at C3-C5 and C6-T1. Electronically signed by: Santos Pacheco MD Date:    08/21/2023 Time:    15:49    XR Cervical Spine AP Lateral w/ Flex Ext and Swimmers Flex Ext Without Odontoid    Result Date: 8/21/2023  No fracture.  Additional findings above. Electronically signed by: Trell Church MD Date:    08/21/2023 Time:    15:41    MRI Thoracic Spine Without Contrast    Result Date: 8/21/2023  1. Disc extrusion likely containing vacuum phenomenon or calcification at T10-T11 resulting in severe spinal canal stenosis.  Associated spinal cord edema or myelomalacia noted. 2. Fluid signal within the  T10-T11 disc space favored to be degenerative.  Absence of endplate erosion and probable associated vacuum phenomenon favor degenerative etiology.  Recommend further evaluation with CT as well as clinical assessment. 3. Several T2 hyperintense lesions within the liver, incompletely characterized.  Recommend abdominal ultrasound for further evaluation. Electronically signed by: Santos Pacheco MD Date:    08/21/2023 Time:    15:10    X-ray Lumbar Spine Flexion And Extension Only    Result Date: 8/21/2023  Allowing for the above limitations, flexion and extension lateral views of the lumbar spine demonstrate no subluxation. Electronically signed by: Kenny Rodrigez Date:    08/21/2023 Time:    14:29    X-Ray Hip 2 or 3 views Left (with Pelvis when performed)    Result Date: 8/21/2023  Allowing for the above limitations, no acute radiographic abnormality is demonstrated in the left total hip prosthesis or visualized portion of the osseous pelvic ring. Partially visualized right total hip prosthesis. Electronically signed by: Kenny Rodrigez Date:    08/21/2023 Time:    14:20       Assessment/Plan:     * Thoracic spinal stenosis  Emilia Pablo is a 78 y.o. female with PMH of osteoporosis- on Prolia, HTN, Abreu-Stickler Syndrome, worsening BLE weakness x 3 months who presents for evaluation of acute myelopathy.     - MRI cervical and thoracic spine shows moderate canal stenosis C4-5, T10-11 2/2 calcified disc herniation with cord signal change, multilevel foraminal stenosis.   - MRI lumbar spine with stable lumbar collection in subcutaneous tissues. No significant central stenosis. Prior MRI L spine 8/2/2023 limited evaluation 2/2 hardware artifact, but without severe canal stenosis.      POD1 T10-11 discectomy and extension/revision of prior fusion to T8-pelvis.   - continue tx for orthostasis  - f/u thoracolumbar xray  - stable for transfer to floor  - drain to gravity        Abran Kapoor MD  Neurosurgery  [facility]

## 2023-08-23 NOTE — SUBJECTIVE & OBJECTIVE
Interval History: 8/23: POD 1 s/p T10-11 discectomy w T8-L2 fusion. NAEON    Medications:  Continuous Infusions:      Scheduled Meds:   gabapentin  300 mg Oral QHS    [START ON 8/24/2023] heparin (porcine)  5,000 Units Subcutaneous Q8H    senna-docusate 8.6-50 mg  1 tablet Oral BID    silodosin  4 mg Oral Daily     PRN Meds:acetaminophen, dextrose 10%, dextrose 10%, famotidine, glucagon (human recombinant), glucose, glucose, glucose, hydrALAZINE, labetalol, magnesium oxide, magnesium oxide, melatonin, methocarbamoL, ondansetron, oxyCODONE, oxyCODONE, potassium bicarbonate, potassium bicarbonate, potassium bicarbonate, potassium, sodium phosphates, potassium, sodium phosphates, potassium, sodium phosphates     Review of Systems  Objective:     Weight: 68.5 kg (151 lb 0.2 oz)  Body mass index is 25.92 kg/m².  Vital Signs (Most Recent):  Temp: 98.2 °F (36.8 °C) (08/23/23 1101)  Pulse: 73 (08/23/23 1401)  Resp: 18 (08/23/23 1532)  BP: (!) 106/51 (08/23/23 1401)  SpO2: 100 % (08/23/23 1401) Vital Signs (24h Range):  Temp:  [98 °F (36.7 °C)-98.5 °F (36.9 °C)] 98.2 °F (36.8 °C)  Pulse:  [] 73  Resp:  [13-61] 18  SpO2:  [81 %-100 %] 100 %  BP: ()/(46-70) 106/51  Arterial Line BP: ()/(34-59) 72/59     Date 08/23/23 0700 - 08/24/23 0659   Shift 1491-8209 3359-5020 9077-0608 24 Hour Total   INTAKE   P.O. 180   180   Shift Total(mL/kg) 180(2.6)   180(2.6)   OUTPUT   Urine(mL/kg/hr) 100(0.2)   100   Drains 125   125   Shift Total(mL/kg) 225(3.3)   225(3.3)   Weight (kg) 68.5 68.5 68.5 68.5                            Urethral Catheter 08/21/23 1530 Non-latex 16 Fr. (Active)   Site Assessment Clean;Intact 08/22/23 0200   Collection Container Urimeter 08/22/23 0200   Securement Method secured to top of thigh w/ adhesive device 08/22/23 0200   Catheter Care Performed yes 08/22/23 0600   Reason for Continuing Urinary Catheterization Urinary retention 08/22/23 0200   CAUTI Prevention Bundle Securement Device in place  "with 1" slack;Intact seal between catheter & drainage tubing;Drainage bag/urimeter off the floor;Sheeting clip in use;No dependent loops or kinks;Drainage bag/urimeter not overfilled (<2/3 full);Drainage bag/urimeter below bladder 08/21/23 2355   Output (mL) 1000 mL 08/22/23 0538          Physical Exam         Neurosurgery Physical Exam  General: well developed, well nourished, no distress.   Head: normocephalic, atraumatic  Neurologic: Alert and oriented. Thought content appropriate.  GCS: Motor: 6/Verbal: 5/Eyes: 4 GCS Total: 15  Mental Status: Awake, Alert, Oriented x 4  Language: No aphasia  Speech: No dysarthria  Cranial nerves: face symmetric, tongue midline, CN II-XII grossly intact, EOMI.   Pulmonary: normal respirations, no signs of respiratory distress  Abdomen: soft, non-distended, not tender to palpation  Sensory: intact to light touch throughout  Motor Strength:   BUE 5/5  LLE 3/5 HF/KF/KE/EHL, 0/5 DF/PF.   RLE 4- prox, 4+ ke, 5 df/pf.   no anai/clon  Vascular: No LE edema.   Skin: Skin is warm, dry and intact.    Significant Labs:  Recent Labs   Lab 08/22/23 0522 08/22/23 2029 08/23/23  0001   GLU 89 118* 132*    137 135*   K 4.4 4.0 4.2    112* 113*   CO2 24 19* 17*   BUN 13 11 12   CREATININE 0.6 0.6 0.6   CALCIUM 8.7 7.0* 6.8*   MG  --  2.0 1.9       Recent Labs   Lab 08/22/23 2029 08/23/23  0001 08/23/23  1030   WBC 9.96 10.90 8.40   HGB 9.0* 8.6* 8.3*   HCT 26.8* 26.0* 24.7*    187 187       Recent Labs   Lab 08/22/23 2029   INR 1.1   APTT 23.1       Microbiology Results (last 7 days)       ** No results found for the last 168 hours. **          All pertinent labs from the last 24 hours have been reviewed.    Significant Diagnostics:  MRI Lumbar Spine Without Contrast    Result Date: 8/22/2023  Postoperative changes L1 through S1 posterior instrumented fusion.  There is susceptibility artifact which limits evaluation. Multilevel degenerative changes as above, noting " susceptibility artifact limits evaluation of the neural foramina at multiple levels.  Findings most significantly contribute to severe left neural foraminal narrowing at L5-S1.  No high-grade canal stenosis. Stable fluid collection in the subcutaneous tissues at the operative bed. Additional findings discussed above and identified on same day thoracic spine MRI. Electronically signed by resident: Juan Wright Date:    08/22/2023 Time:    00:09 Electronically signed by: Ayush Back MD Date:    08/22/2023 Time:    01:15    US Abdomen Complete    Result Date: 8/22/2023  Hepatic cyst in the left lobe which correlates with MRI T-spine from same date.  No suspicious focal liver lesions. Cholelithiasis.  Mild gallbladder wall thickening without other convincing signs of acute cholecystitis. Left kidney simple cyst. Electronically signed by resident: Juan Wright Date:    08/21/2023 Time:    23:40 Electronically signed by: Ayush Back MD Date:    08/22/2023 Time:    00:20    US Lower Extremity Veins Bilateral    Result Date: 8/21/2023  1. No evidence of DVT bilaterally. 2. Baker cyst in the right popliteal fossa. Electronically signed by: Armando Camarillo Date:    08/21/2023 Time:    23:11    X-Ray Chest AP Portable    Result Date: 8/21/2023  1. No convincing acute cardiopulmonary process noting coarse interstitial attenuation suggests underlying COPD/emphysema.  Correlation is advised. Electronically signed by: Jovan Cartagena MD Date:    08/21/2023 Time:    19:28    CT Lumbar Spine Without Contrast    Result Date: 8/21/2023  1. High attenuating lobular focus lies posterior to the T10 vertebral body, correlating with low signal focus on previous MRI.  This is suspected to reflect herniated nucleus polyposis in the setting of advanced degenerative disc disease as there is vacuum disc phenomenon at the T10-T11 disc space.  Although there is a punctate focus of air in the region, this is likely related to vacuum disc  phenomenon extending from the joint space and left facet rather than infectious process.  No significant soft tissue edema in the region to support a diagnosis of infection.  Correlation however is advised, and correlation with patient's symptomatology and findings on prior MRI advised. 2. Evolving surgical changes of the lumbar spine as described noting resolution of prior postoperative fluid collection.  No definite fracture although evaluation for the same is significantly limited given the extent of motion artifact. 3. Please see above for several additional findings. Electronically signed by: Jovan Cartagena MD Date:    08/21/2023 Time:    18:41    CT Thoracic Spine Without Contrast    Result Date: 8/21/2023  1. High attenuating lobular focus lies posterior to the T10 vertebral body, correlating with low signal focus on previous MRI.  This is suspected to reflect herniated nucleus polyposis in the setting of advanced degenerative disc disease as there is vacuum disc phenomenon at the T10-T11 disc space.  Although there is a punctate focus of air in the region, this is likely related to vacuum disc phenomenon extending from the joint space and left facet rather than infectious process.  No significant soft tissue edema in the region to support a diagnosis of infection.  Correlation however is advised, and correlation with patient's symptomatology and findings on prior MRI advised. 2. Evolving surgical changes of the lumbar spine as described noting resolution of prior postoperative fluid collection.  No definite fracture although evaluation for the same is significantly limited given the extent of motion artifact. 3. Please see above for several additional findings. Electronically signed by: Jovan Cartagena MD Date:    08/21/2023 Time:    18:41    MRI Cervical Spine Without Contrast    Result Date: 8/21/2023  1. Postoperative change of ACDF at C5-C7. 2. Multilevel degenerative changes detailed above.  Severe  spinal canal stenosis noted at C3-C4, moderate at C2-C3, C4-C5, and C6-C7.  Moderate to severe neural foraminal narrowing noted at C3-C5 and C6-T1. Electronically signed by: Santos Pacheco MD Date:    08/21/2023 Time:    15:49    XR Cervical Spine AP Lateral w/ Flex Ext and Swimmers Flex Ext Without Odontoid    Result Date: 8/21/2023  No fracture.  Additional findings above. Electronically signed by: Trell Church MD Date:    08/21/2023 Time:    15:41    MRI Thoracic Spine Without Contrast    Result Date: 8/21/2023  1. Disc extrusion likely containing vacuum phenomenon or calcification at T10-T11 resulting in severe spinal canal stenosis.  Associated spinal cord edema or myelomalacia noted. 2. Fluid signal within the T10-T11 disc space favored to be degenerative.  Absence of endplate erosion and probable associated vacuum phenomenon favor degenerative etiology.  Recommend further evaluation with CT as well as clinical assessment. 3. Several T2 hyperintense lesions within the liver, incompletely characterized.  Recommend abdominal ultrasound for further evaluation. Electronically signed by: Santos Pacheco MD Date:    08/21/2023 Time:    15:10    X-ray Lumbar Spine Flexion And Extension Only    Result Date: 8/21/2023  Allowing for the above limitations, flexion and extension lateral views of the lumbar spine demonstrate no subluxation. Electronically signed by: Kenny Rodrigez Date:    08/21/2023 Time:    14:29    X-Ray Hip 2 or 3 views Left (with Pelvis when performed)    Result Date: 8/21/2023  Allowing for the above limitations, no acute radiographic abnormality is demonstrated in the left total hip prosthesis or visualized portion of the osseous pelvic ring. Partially visualized right total hip prosthesis. Electronically signed by: Kenny Rodrigez Date:    08/21/2023 Time:    14:20

## 2023-08-23 NOTE — CONSULTS
[facility]  Wound Care    Patient Name:  Emilia Pablo   MRN:  [id number]  Date: 8/23/2023  Diagnosis: Thoracic spinal stenosis    History:     Past Medical History:   Diagnosis Date    Arthritis     osteoarthritis    Hypertension     Abreu-Stickler syndrome        Social History     Socioeconomic History    Marital status:      Spouse name: Vicky Hillman   Occupational History     Comment: retired nurse   Tobacco Use    Smoking status: Never     Passive exposure: Never    Smokeless tobacco: Never   Substance and Sexual Activity    Alcohol use: Yes     Comment: occasional     Social Determinants of Health     Financial Resource Strain: Low Risk  (8/22/2023)    Overall Financial Resource Strain (CARDIA)     Difficulty of Paying Living Expenses: Not hard at all   Food Insecurity: No Food Insecurity (8/22/2023)    Hunger Vital Sign     Worried About Running Out of Food in the Last Year: Never true     Ran Out of Food in the Last Year: Never true   Transportation Needs: No Transportation Needs (8/22/2023)    PRAPARE - Transportation     Lack of Transportation (Medical): No     Lack of Transportation (Non-Medical): No   Physical Activity: Insufficiently Active (8/22/2023)    Exercise Vital Sign     Days of Exercise per Week: 3 days     Minutes of Exercise per Session: 20 min   Stress: No Stress Concern Present (8/22/2023)    [employer] - Occupational Stress Questionnaire     Feeling of Stress : Not at all   Social Connections: Unknown (8/22/2023)    Social Connection and Isolation Panel [NHANES]     Frequency of Communication with Friends and Family: More than three times a week     Frequency of Social Gatherings with Friends and Family: More than three times a week     Attends Anabaptist Services: More than 4 times per year     Active Member of Clubs or Organizations: Yes     Attends Club or Organization Meetings: More than 4 times per year   Housing  Stability: Unknown (8/22/2023)    Housing Stability Vital Sign     Unable to Pay for Housing in the Last Year: No     Unstable Housing in the Last Year: No       Precautions:     Allergies as of 08/21/2023    (No Known Allergies)       [facility] Assessment Details/Treatment     Patient seen for wound care consultation for R posterior knee.   Reviewed chart for this encounter.   See Flow Sheet for findings.    Pt found lying in bed, agreeable to care at this time. Pt states she developed wounds to BL posterior knees from a transport chair she uses at home. To L posterior knee, pt has intact scabs - no intervention needed. R posterior knee similar in apperance, however, has x1 partial thickness wound, Xeroform/foam dressing applied.    RECOMMENDATIONS:  Q3 days - R posterior knee - cleanse w/ NS, pat dry. Apply Xeroform or petroleum gauze to wound bed and secure w/ foam dressing.     Discussed POC with patient and primary nurse.   See EMR for orders & patient education.      Nursing to continue care.  Nursing to maintain pressure injury prevention interventions.           08/23/23 1045   WOCN Assessment   WOCN Total Time (mins) 25   Visit Date 08/23/23   Visit Time 1045   Consult Type New   WOCN Speciality Wound   Intervention assessed;changed;applied;chart review;coordination of care;orders   Teaching on-going        Altered Skin Integrity 08/21/23 2355 Right posterior Knee Skin Tear Partial thickness tissue loss. Shallow open ulcer with a red or pink wound bed, without slough. Intact or Open/Ruptured Serum-filled blister.   Date First Assessed/Time First Assessed: 08/21/23 2355   Altered Skin Integrity Present on Admission - Did Patient arrive to the hospital with altered skin?: yes  Side: Right  Orientation: posterior  Location: Knee  Primary Wound Type: Skin Tear  Desc...   Wound Image    Description of Altered Skin Integrity Partial thickness tissue loss. Shallow open ulcer with a red or pink wound bed, without slough.  Intact or Open/Ruptured Serum-filled blister.   Dressing Appearance Open to air   Drainage Amount None   Drainage Characteristics/Odor No odor   Appearance Pink;Red   Tissue loss description Partial thickness   Periwound Area Intact;Dry   Wound Edges Irregular   Dressing Applied;Non-adherent;Foam        Altered Skin Integrity 08/21/23 2355 Left posterior Popliteal Skin Tear   Date First Assessed/Time First Assessed: 08/21/23 2355   Altered Skin Integrity Present on Admission - Did Patient arrive to the hospital with altered skin?: yes  Side: Left  Orientation: posterior  Location: Popliteal  Primary Wound Type: Skin Tear   Wound Image    Description of Altered Skin Integrity Intact skin with non-blanchable redness of localized area   Dressing Appearance Open to air   Drainage Amount None   Drainage Characteristics/Odor No odor   Appearance Pink;Red;Intact;Dry

## 2023-08-23 NOTE — PT/OT/SLP EVAL
Physical Therapy Co-Evaluation and Co-Treatment with OT    Patient Name:  Emilia Pablo   MRN:  [id number]    Recent Surgery: Procedure(s) (LRB):  FUSION, SPINE (N/A) 1 Day Post-Op    Patient required co-tx with OT secondary to need for multiple set of skilled hands to provide safest therapy and best outcomes.      Recommendations:     Discharge Recommendations:  other (see comments)   Discharge Equipment Recommendations:     Barriers to discharge: Increased level of assist    Highest Level of Mobility: Supine to sit  Assistance Required: Mod(A)    Assessment:     Emilia Pablo is a 78 y.o. female admitted with a medical diagnosis of Thoracic spinal stenosis. She presents with the following impairments/functional limitations:  weakness, impaired balance, decreased safety awareness, impaired endurance, impaired self care skills, impaired functional mobility, gait instability, decreased lower extremity function, impaired cardiopulmonary response to activity, orthopedic precautions    Pt met with HOB elevated and agreeable to PT session. Pt reports at baseline she uses a transport chair for mobility propelling with her feet. She is able to t/f to and from the transport chair using a standard walker and assist from her son. Pt was last ambulatory in 5/23. Today's evaluation was  limited by hypotension at EOB. Pt with BP of 99/70 (79) on PT entry. Pt's BP dropped to 76/39 (54) in sitting with associated dizziness, so she was returned to supine and placed in trendelenburg. RN notified. In trendelenburg, pt's BP read 65/32 (44). Pt remained alert and conversant throughout. RN present and administered a bolus. BP then fernanda to 124/51 (74). Writing therapist unable to assess transfers on this date.     Pt would benefit from continued skilled acute PT 4x/wk to address above listed functional deficits, provide patient/caregiver education, reduce fall risk, and maximize (I) and safety with functional mobility.     Rehab  Prognosis: Good; patient would benefit from acute skilled PT services to address these deficits and reach maximum level of function.      Plan:     During this hospitalization, patient to be seen 4 x/week to address the identified rehab impairments via gait training, therapeutic activities, therapeutic exercises, neuromuscular re-education and progress toward the following goals:    Plan of Care Expires:  09/23/23    This plan of care has been discussed with the patient/caregiver, who was included in its development and is in agreement with the identified goals and treatment plan.     Subjective     Communicated with RN prior to session.  Patient agreeable to participate.     Chief Complaint: S/p T8-L2 fusion   Patient/Family Comments/goals: None stated    Pain/Comfort:  Pain Rating 1: 6/10  Location - Orientation 1: generalized  Location 1: back  Pain Addressed 1: Reposition, Distraction  Pain Rating Post-Intervention 1: 6/10  Pain Rating 2: 6/10  Location - Side 2: Bilateral  Location - Orientation 2: generalized  Location 2: hip  Pain Addressed 2: Reposition, Distraction  Pain Rating Post-Intervention 2: 6/10    Patients cultural, spiritual, Rastafarian conflicts given the current situation: no    Patient's living environment is as follows:  Living Environment: Pt lives with son in [facility] with threshold ELEONORA. Bathroom set-up: tub/shower combo with bath bench in place. Pt also has a WIS w/ grab bars in place  Prior Level of Function: Pt reports at baseline she uses a transport chair for mobility propelling with her feet. She is able to t/f to and from the transport chair using a standard walker and assist from her son. Pt was last ambulatory in 5/23.   DME used: walker, standard (transport chair)  DME owned (not currently used): none  Upon discharge, patient will have assistance from: Son    Objective:     Patient found HOB elevated with bed alarm, blood pressure cuff, telemetry, SCD, pulse ox (continuous), peripheral  "IV, PureWick, wound vac  upon PT entry to room.    General Precautions: Standard, fall   Orthopedic Precautions:spinal precautions   Braces: TLSO   BP (!) 106/51   Pulse 73   Temp 98.1 °F (36.7 °C)   Resp 18   Ht 5' 4" (1.626 m)   Wt 68.5 kg (151 lb 0.2 oz)   SpO2 100%   Breastfeeding No   BMI 25.92 kg/m²   Oxygen Device:  room air      Exams:    Cognition:  Patient is oriented to Person, Place, Time, Situation  Follows multistep  commands  Insight to deficits/safety awareness: intact    Edema: None present    Postural examination/scapula alignment: Rounded shoulder    Lower Extremity Range of Motion:  Right Lower Extremity: WNL  Left Lower Extremity: WNL    Lower Extremity Strength  NEHEMIAH due to hypotension at EOB    Functional Mobility:    Bed Mobility:  Supine to Sit: Moderate Assistance on R side of bed  Via logroll  TLSO donned in sitting  Sit to Supine: Moderate Assistance  Rolling R: Minimal Assistance  Scooting anteriorly to EOB to plant feet on floor: Minimal Assistance    Transfers:   Sit to Stand Transfer: Activity did not occur due to hypotension at EOB       Balance:  Static Sit:   Stand-By Assist at EOB   Normal: Patient able to maintain steady balance without handhold support.      Therapeutic Activities/Exercises     Patient assisted with functional mobility as noted above  Discussed at length benefits of PT  Therapist educated patient on spinal precautions: no bending, lifting, or twisting. Patient expressed understanding.  Patient educated on the importance of early mobility, OOB to prevent functional decline during hospital stay  Patient educated on PT POC and role of PT in acute care    AM-PAC 6 CLICK MOBILITY  Turning over in bed (including adjusting bedclothes, sheets and blankets)?: 3  Sitting down on and standing up from a chair with arms (e.g., wheelchair, bedside commode, etc.): 1  Moving from lying on back to sitting on the side of the bed?: 2  Moving to and from a bed to a chair " (including a wheelchair)?: 1  Need to walk in hospital room?: 1  Climbing 3-5 steps with a railing?: 1  Basic Mobility Total Score: 9      Patient left  HOB in trendelenburg  with all lines intact, call button in reach, bed alarm on, and RN present.      History/Goals:     PAST MEDICAL HISTORY:  Past Medical History:   Diagnosis Date    Arthritis     osteoarthritis    Hypertension     Abreu-Stickler syndrome        Past Surgical History:   Procedure Laterality Date    acdf      2 level    BACK SURGERY      laminectomy    BACK SURGERY      lumbar fusion L1-S1    CARDIAC CATHETERIZATION  2016    Normal Dr Wright; see media note; normal     SECTION      x2    EYE SURGERY Bilateral     laser   -   Nano's     JOINT REPLACEMENT Left 2013    JOINT REPLACEMENT Right 5/11/15    SPINAL FUSION N/A 2023    Procedure: FUSION, SPINE;  Surgeon: Pedrito Chand MD;  Location: [facility] OR [address] [place] [address];  Service: Neurosurgery;  Laterality: N/A;  T8-pelvis extension and revision of fusion, T10-11 discectomy; neuromonitoring, depuy    TONSILLECTOMY         GOALS:   Multidisciplinary Problems       Physical Therapy Goals          Problem: Physical Therapy    Goal Priority Disciplines Outcome Goal Variances Interventions   Physical Therapy Goal     PT, PT/OT Ongoing, Progressing     Description: Goals to be met by: 23     Patient will increase functional independence with mobility by performin. Supine to sit with Stand-by Assistance  2. Sit to supine with Stand-by Assistance  3. Sit to stand transfer with Contact Guard Assistance  4. Bed to chair transfer with Contact Guard Assistance using LRAD as needed  5. Gait  x 150 feet with Contact Guard Assistance using LRAD as needed.   6. Lower extremity exercise program x15 reps per handout, with assistance as needed                         Time Tracking:     PT Received On: 23  PT Start Time: 1110     PT Stop Time: 1143  PT Total Time (min): 33 min      Billable Minutes: Evaluation 10, Therapeutic Activity 13, and Therapeutic Exercise 10      Niyah Galvan, PT  08/23/2023  Pager# [phone]

## 2023-08-23 NOTE — PT/OT/SLP EVAL
Occupational Therapy   Co-Evaluation with Physical Therapy  Co-evaluation/treatment performed due to patient's multiple deficits requiring two skilled therapists to appropriately and safely assess patient's strength, endurance, functional mobility, and ADL performance while facilitating functional tasks in addition to accommodating for patient's activity tolerance and medical acuity.    Name: Emilia Pablo  MRN: [id number]  Admitting Diagnosis: Thoracic spinal stenosis  Recent Surgery: Procedure(s) (LRB):  FUSION, SPINE (N/A) 1 Day Post-Op    Recommendations:     Discharge Recommendations: other (see comments)  Discharge Equipment Recommendations:     Barriers to discharge:  Decreased caregiver support    Assessment:     Emilia Pablo is a 78 y.o. female with a medical diagnosis of Thoracic spinal stenosis.  She presents with the following performance deficits affecting function: weakness, impaired endurance, impaired self care skills, impaired functional mobility, decreased coordination, impaired balance, gait instability, decreased upper extremity function, decreased lower extremity function, decreased safety awareness, orthopedic precautions, pain.  Pt reported that at baseline she is able to transfer to/from a transport chair using a standard walker and typically mobilizes vis propelling a transport chair with her feet. Pt was last ambulatory in 5/23, has been requiring assist from her son since that time for ADLs and functional mobility. Today's eval was limited by hypotension at EOB. Upon initiation of eval, pt with BP of 99/70. Following supine>sit, pt's BP dropped to 76/39 with associated dizziness, so she was return to supine and placed in trendelenburg - RN notified. In trendelenburg, pt's BP measured 65/32, but pt alert and engaged in conversation throughout. RN present, administered a bolus, following which BP fernanda to 124/51. Functional mobility limited on this date as a result of the above events, to be  "assessed at a later date as appropriate.    Rehab Prognosis: Good; patient would benefit from acute skilled OT services to address these deficits and reach maximum level of function.       Plan:     Patient to be seen 4 x/week to address the above listed problems via self-care/home management, therapeutic activities, therapeutic exercises, neuromuscular re-education  Plan of Care Expires: 09/23/23  Plan of Care Reviewed with: patient    Subjective     Chief Complaint: Pain  Patient/Family Comments/goals: "I'm dizzy"    Occupational Profile:  Living Environment: Pt lives with son in [facility] with threshold to enter. Bathroom setup consists of a tub/shower combo with a TTB. Pt also has a WIS w/grab bars in place.  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  Patient found HOB elevated with bed alarm, blood pressure cuff, PureWick, wound vac, peripheral IV, pulse ox (continuous), SCD, hemovac upon OT entry to room.    General Precautions: Standard, fall  Orthopedic Precautions: spinal precautions  Braces: TLSO  Respiratory Status: Room air    Occupational Performance:    Bed Mobility:    Patient completed Rolling/Turning to Right " with minimum assistance  Patient completed Scooting/Bridging with minimum assistance  Patient completed Supine to Sit with moderate assistance via logroll technique  Patient completed Sit to Supine with moderate assistance via logroll technique    Functional Mobility/Transfers:  OOB activity did not occur on this date 2/2 pt hypotension upon sup>sit    Activities of Daily Living:  Upper Body Dressing: moderate assistance to don gown as robe, TA to doff gown as robe 2/2 hypotension; Max A to don/doff TLSO brace  Lower Body Dressing: total assistance to don socks    Cognitive/Visual Perceptual:  Cognitive/Psychosocial Skills:     -       Oriented to: Person, Place, Time, and Situation   -       Follows Commands/attention:Follows one-step commands  -       Communication: clear/fluent  -       Safety awareness/insight to disability: intact     Physical Exam:  Balance:    -       Good sitting balance, standing deferred on this date 2/2 hypotension  Dominant hand:    -       Right  Upper Extremity Range of Motion:     -       Right Upper Extremity: WFL  -       Left Upper Extremity: WFL  Upper Extremity Strength:    -       Right Upper Extremity: WFL  -       Left Upper Extremity: WFL   Strength:    -       Right Upper Extremity: WFL  -       Left Upper Extremity: WFL  Fine Motor Coordination:    -       Intact    AMPAC 6 Click ADL:  AMPAC Total Score: 16    Treatment & Education:  Pt participated in education on the following:  - role of OT, OT POC  - spinal precautions, including no bending, lifting >5lbs, or twisting  - log roll techniques  - application of brace and brace wearing schedule  - use of call light/importance of calling for staff assist for mobility  - All pt questions within OT scope of practice addressed.  - Whiteboard updated       Patient left  in Trendelenburg  with all lines intact, call button in reach, RN notified, and RN present    GOALS:   Multidisciplinary Problems       Occupational Therapy  Goals          Problem: Occupational Therapy    Goal Priority Disciplines Outcome Interventions   Occupational Therapy Goal     OT, PT/OT Ongoing, Progressing    Description: Goals to be met by: 23     Patient will increase functional independence with ADLs by performing:    UE Dressing with Stand-by Assistance.  LE Dressing with Stand-by Assistance.  Grooming while standing at sink with Stand-by Assistance.  Supine to sit with Supervision.  Step transfer with Stand-by Assistance  Toilet transfer to toilet with Stand-by Assistance.                         History:     Past Medical History:   Diagnosis Date    Arthritis     osteoarthritis    Hypertension     Abreu-Stickler syndrome          Past Surgical History:   Procedure Laterality Date    acdf      2 level    BACK SURGERY      laminectomy    BACK SURGERY      lumbar fusion L1-S1    CARDIAC CATHETERIZATION  2016    Normal Dr Wright; see media note; normal     SECTION      x2    EYE SURGERY Bilateral     laser   -   Nano's     JOINT REPLACEMENT Left 2013    JOINT REPLACEMENT Right 5/11/15    SPINAL FUSION N/A 2023    Procedure: FUSION, SPINE;  Surgeon: Pedrito Chand MD;  Location: [facility] OR [address] [place] [address];  Service: Neurosurgery;  Laterality: N/A;  T8-pelvis extension and revision of fusion, T10-11 discectomy; neuromonitoring, depuy    TONSILLECTOMY         Time Tracking:     OT Date of Treatment: 23  OT Start Time: 1110  OT Stop Time: 1148  OT Total Time (min): 38 min    Billable Minutes:Evaluation 10  Self Care/Home Management 10  Therapeutic Activity 18    2023

## 2023-08-23 NOTE — ANESTHESIA POSTPROCEDURE EVALUATION
Anesthesia Post Evaluation    Patient: Emilia Pablo    Procedure(s) Performed: Procedure(s) (LRB):  FUSION, SPINE (N/A)    Final Anesthesia Type: general      Patient location during evaluation: ICU  Patient participation: Yes- Able to Participate  Level of consciousness: awake and alert  Post-procedure vital signs: reviewed and stable  Pain management: adequate  Airway patency: patent    PONV status at discharge: No PONV  Anesthetic complications: no      Cardiovascular status: blood pressure returned to baseline  Respiratory status: unassisted  Hydration status: euvolemic  Follow-up not needed.          Vitals Value Taken Time   /58 08/23/23 0802   Temp 36.7 °C (98.1 °F) 08/23/23 0701   Pulse 78 08/23/23 0824   Resp 23 08/23/23 0824   SpO2 100 % 08/23/23 0824   Vitals shown include unvalidated device data.      No case tracking events are documented in the log.      Pain/Janett Score: Pain Rating Prior to Med Admin: 6 (8/22/2023 11:44 PM)

## 2023-08-24 PROBLEM — E83.51 HYPOCALCEMIA: Status: ACTIVE | Noted: 2023-08-24

## 2023-08-24 PROBLEM — D62 ACUTE BLOOD LOSS ANEMIA: Status: ACTIVE | Noted: 2023-08-24

## 2023-08-24 LAB
ALBUMIN SERPL BCP-MCNC: 2.5 G/DL (ref 3.5–5.2)
ALP SERPL-CCNC: 32 U/L (ref 55–135)
ALT SERPL W/O P-5'-P-CCNC: 12 U/L (ref 10–44)
ANION GAP SERPL CALC-SCNC: 6 MMOL/L (ref 8–16)
AST SERPL-CCNC: 26 U/L (ref 10–40)
BASOPHILS # BLD AUTO: 0.01 K/UL (ref 0–0.2)
BASOPHILS # BLD AUTO: 0.02 K/UL (ref 0–0.2)
BASOPHILS NFR BLD: 0.1 % (ref 0–1.9)
BASOPHILS NFR BLD: 0.3 % (ref 0–1.9)
BILIRUB SERPL-MCNC: 1.2 MG/DL (ref 0.1–1)
BLD PROD TYP BPU: NORMAL
BLOOD UNIT EXPIRATION DATE: NORMAL
BLOOD UNIT TYPE CODE: 6200
BLOOD UNIT TYPE: NORMAL
BUN SERPL-MCNC: 15 MG/DL (ref 8–23)
CA-I BLDV-SCNC: 1.02 MMOL/L (ref 1.06–1.42)
CALCIUM SERPL-MCNC: 7.4 MG/DL (ref 8.7–10.5)
CHLORIDE SERPL-SCNC: 108 MMOL/L (ref 95–110)
CO2 SERPL-SCNC: 21 MMOL/L (ref 23–29)
CODING SYSTEM: NORMAL
CREAT SERPL-MCNC: 0.6 MG/DL (ref 0.5–1.4)
CROSSMATCH INTERPRETATION: NORMAL
DIFFERENTIAL METHOD: ABNORMAL
DIFFERENTIAL METHOD: ABNORMAL
DISPENSE STATUS: NORMAL
EOSINOPHIL # BLD AUTO: 0 K/UL (ref 0–0.5)
EOSINOPHIL # BLD AUTO: 0 K/UL (ref 0–0.5)
EOSINOPHIL NFR BLD: 0.1 % (ref 0–8)
EOSINOPHIL NFR BLD: 0.3 % (ref 0–8)
ERYTHROCYTE [DISTWIDTH] IN BLOOD BY AUTOMATED COUNT: 13.6 % (ref 11.5–14.5)
ERYTHROCYTE [DISTWIDTH] IN BLOOD BY AUTOMATED COUNT: 13.7 % (ref 11.5–14.5)
EST. GFR  (NO RACE VARIABLE): >60 ML/MIN/1.73 M^2
GLUCOSE SERPL-MCNC: 116 MG/DL (ref 70–110)
HCT VFR BLD AUTO: 23.6 % (ref 37–48.5)
HCT VFR BLD AUTO: 27.5 % (ref 37–48.5)
HGB BLD-MCNC: 7.9 G/DL (ref 12–16)
HGB BLD-MCNC: 9.2 G/DL (ref 12–16)
IMM GRANULOCYTES # BLD AUTO: 0.03 K/UL (ref 0–0.04)
IMM GRANULOCYTES # BLD AUTO: 0.03 K/UL (ref 0–0.04)
IMM GRANULOCYTES NFR BLD AUTO: 0.4 % (ref 0–0.5)
IMM GRANULOCYTES NFR BLD AUTO: 0.4 % (ref 0–0.5)
LYMPHOCYTES # BLD AUTO: 0.9 K/UL (ref 1–4.8)
LYMPHOCYTES # BLD AUTO: 1.2 K/UL (ref 1–4.8)
LYMPHOCYTES NFR BLD: 12.3 % (ref 18–48)
LYMPHOCYTES NFR BLD: 14.4 % (ref 18–48)
MAGNESIUM SERPL-MCNC: 1.8 MG/DL (ref 1.6–2.6)
MCH RBC QN AUTO: 30.2 PG (ref 27–31)
MCH RBC QN AUTO: 30.4 PG (ref 27–31)
MCHC RBC AUTO-ENTMCNC: 33.5 G/DL (ref 32–36)
MCHC RBC AUTO-ENTMCNC: 33.5 G/DL (ref 32–36)
MCV RBC AUTO: 90 FL (ref 82–98)
MCV RBC AUTO: 91 FL (ref 82–98)
MONOCYTES # BLD AUTO: 0.6 K/UL (ref 0.3–1)
MONOCYTES # BLD AUTO: 0.7 K/UL (ref 0.3–1)
MONOCYTES NFR BLD: 8.2 % (ref 4–15)
MONOCYTES NFR BLD: 8.8 % (ref 4–15)
NEUTROPHILS # BLD AUTO: 5.5 K/UL (ref 1.8–7.7)
NEUTROPHILS # BLD AUTO: 6.3 K/UL (ref 1.8–7.7)
NEUTROPHILS NFR BLD: 76.2 % (ref 38–73)
NEUTROPHILS NFR BLD: 78.5 % (ref 38–73)
NRBC BLD-RTO: 0 /100 WBC
NRBC BLD-RTO: 0 /100 WBC
PHOSPHATE SERPL-MCNC: 1.5 MG/DL (ref 2.7–4.5)
PLATELET # BLD AUTO: 118 K/UL (ref 150–450)
PLATELET # BLD AUTO: 144 K/UL (ref 150–450)
PMV BLD AUTO: 9.5 FL (ref 9.2–12.9)
PMV BLD AUTO: 9.7 FL (ref 9.2–12.9)
POTASSIUM SERPL-SCNC: 3.4 MMOL/L (ref 3.5–5.1)
PROT SERPL-MCNC: 4.3 G/DL (ref 6–8.4)
RBC # BLD AUTO: 2.6 M/UL (ref 4–5.4)
RBC # BLD AUTO: 3.05 M/UL (ref 4–5.4)
SODIUM SERPL-SCNC: 135 MMOL/L (ref 136–145)
TRANS ERYTHROCYTES VOL PATIENT: NORMAL ML
WBC # BLD AUTO: 6.97 K/UL (ref 3.9–12.7)
WBC # BLD AUTO: 8.27 K/UL (ref 3.9–12.7)

## 2023-08-24 PROCEDURE — 85025 COMPLETE CBC W/AUTO DIFF WBC: CPT | Performed by: NURSE PRACTITIONER

## 2023-08-24 PROCEDURE — 25000003 PHARM REV CODE 250

## 2023-08-24 PROCEDURE — P9021 RED BLOOD CELLS UNIT: HCPCS | Performed by: NURSE PRACTITIONER

## 2023-08-24 PROCEDURE — 80053 COMPREHEN METABOLIC PANEL: CPT

## 2023-08-24 PROCEDURE — 25000003 PHARM REV CODE 250: Performed by: NEUROLOGICAL SURGERY

## 2023-08-24 PROCEDURE — 97112 NEUROMUSCULAR REEDUCATION: CPT

## 2023-08-24 PROCEDURE — 83735 ASSAY OF MAGNESIUM: CPT

## 2023-08-24 PROCEDURE — P9021 RED BLOOD CELLS UNIT: HCPCS

## 2023-08-24 PROCEDURE — 97530 THERAPEUTIC ACTIVITIES: CPT

## 2023-08-24 PROCEDURE — 20600001 HC STEP DOWN PRIVATE ROOM

## 2023-08-24 PROCEDURE — 25000003 PHARM REV CODE 250: Performed by: NURSE PRACTITIONER

## 2023-08-24 PROCEDURE — 63600175 PHARM REV CODE 636 W HCPCS: Performed by: STUDENT IN AN ORGANIZED HEALTH CARE EDUCATION/TRAINING PROGRAM

## 2023-08-24 PROCEDURE — 25000003 PHARM REV CODE 250: Performed by: PHYSICIAN ASSISTANT

## 2023-08-24 PROCEDURE — P9045 ALBUMIN (HUMAN), 5%, 250 ML: HCPCS | Mod: JZ,JG | Performed by: INTERNAL MEDICINE

## 2023-08-24 PROCEDURE — 99291 CRITICAL CARE FIRST HOUR: CPT | Mod: FS,,, | Performed by: NURSE PRACTITIONER

## 2023-08-24 PROCEDURE — 97535 SELF CARE MNGMENT TRAINING: CPT

## 2023-08-24 PROCEDURE — 25000003 PHARM REV CODE 250: Performed by: STUDENT IN AN ORGANIZED HEALTH CARE EDUCATION/TRAINING PROGRAM

## 2023-08-24 PROCEDURE — 84100 ASSAY OF PHOSPHORUS: CPT

## 2023-08-24 PROCEDURE — 63600175 PHARM REV CODE 636 W HCPCS: Mod: JZ,JG | Performed by: INTERNAL MEDICINE

## 2023-08-24 PROCEDURE — 99291 PR CRITICAL CARE, E/M 30-74 MINUTES: ICD-10-PCS | Mod: FS,,, | Performed by: NURSE PRACTITIONER

## 2023-08-24 PROCEDURE — 97110 THERAPEUTIC EXERCISES: CPT

## 2023-08-24 PROCEDURE — 94761 N-INVAS EAR/PLS OXIMETRY MLT: CPT

## 2023-08-24 PROCEDURE — 99900035 HC TECH TIME PER 15 MIN (STAT)

## 2023-08-24 PROCEDURE — 82330 ASSAY OF CALCIUM: CPT | Performed by: NURSE PRACTITIONER

## 2023-08-24 PROCEDURE — 97116 GAIT TRAINING THERAPY: CPT

## 2023-08-24 RX ORDER — HYDROCODONE BITARTRATE AND ACETAMINOPHEN 500; 5 MG/1; MG/1
TABLET ORAL
Status: DISCONTINUED | OUTPATIENT
Start: 2023-08-24 | End: 2023-08-25

## 2023-08-24 RX ORDER — ALBUMIN HUMAN 50 G/1000ML
25 SOLUTION INTRAVENOUS ONCE
Status: COMPLETED | OUTPATIENT
Start: 2023-08-24 | End: 2023-08-24

## 2023-08-24 RX ORDER — MUPIROCIN 20 MG/G
OINTMENT TOPICAL 2 TIMES DAILY
Status: DISCONTINUED | OUTPATIENT
Start: 2023-08-24 | End: 2023-08-28 | Stop reason: HOSPADM

## 2023-08-24 RX ORDER — POLYETHYLENE GLYCOL 3350 17 G/17G
17 POWDER, FOR SOLUTION ORAL DAILY
Status: DISCONTINUED | OUTPATIENT
Start: 2023-08-24 | End: 2023-08-28 | Stop reason: HOSPADM

## 2023-08-24 RX ORDER — BISACODYL 10 MG
10 SUPPOSITORY, RECTAL RECTAL DAILY PRN
Status: DISCONTINUED | OUTPATIENT
Start: 2023-08-24 | End: 2023-08-28 | Stop reason: HOSPADM

## 2023-08-24 RX ADMIN — Medication 6 MG: at 08:08

## 2023-08-24 RX ADMIN — METHOCARBAMOL 750 MG: 750 TABLET ORAL at 08:08

## 2023-08-24 RX ADMIN — GABAPENTIN 300 MG: 300 CAPSULE ORAL at 08:08

## 2023-08-24 RX ADMIN — METHOCARBAMOL 750 MG: 750 TABLET ORAL at 06:08

## 2023-08-24 RX ADMIN — CALCIUM GLUCONATE 1 G: 20 INJECTION, SOLUTION INTRAVENOUS at 01:08

## 2023-08-24 RX ADMIN — CALCIUM GLUCONATE 1 G: 20 INJECTION, SOLUTION INTRAVENOUS at 10:08

## 2023-08-24 RX ADMIN — POTASSIUM & SODIUM PHOSPHATES POWDER PACK 280-160-250 MG 2 PACKET: 280-160-250 PACK at 05:08

## 2023-08-24 RX ADMIN — POTASSIUM BICARBONATE 50 MEQ: 978 TABLET, EFFERVESCENT ORAL at 05:08

## 2023-08-24 RX ADMIN — POTASSIUM & SODIUM PHOSPHATES POWDER PACK 280-160-250 MG 2 PACKET: 280-160-250 PACK at 01:08

## 2023-08-24 RX ADMIN — Medication 800 MG: at 05:08

## 2023-08-24 RX ADMIN — SENNOSIDES AND DOCUSATE SODIUM 1 TABLET: 50; 8.6 TABLET ORAL at 08:08

## 2023-08-24 RX ADMIN — ALBUMIN (HUMAN) 25 G: 12.5 SOLUTION INTRAVENOUS at 10:08

## 2023-08-24 RX ADMIN — POLYETHYLENE GLYCOL 3350 17 G: 17 POWDER, FOR SOLUTION ORAL at 08:08

## 2023-08-24 RX ADMIN — HEPARIN SODIUM 5000 UNITS: 5000 INJECTION INTRAVENOUS; SUBCUTANEOUS at 10:08

## 2023-08-24 RX ADMIN — OXYCODONE HYDROCHLORIDE 5 MG: 5 TABLET ORAL at 09:08

## 2023-08-24 RX ADMIN — SILODOSIN 4 MG: 4 CAPSULE ORAL at 08:08

## 2023-08-24 RX ADMIN — MUPIROCIN: 20 OINTMENT TOPICAL at 08:08

## 2023-08-24 RX ADMIN — HEPARIN SODIUM 5000 UNITS: 5000 INJECTION INTRAVENOUS; SUBCUTANEOUS at 05:08

## 2023-08-24 RX ADMIN — OXYCODONE HYDROCHLORIDE 5 MG: 5 TABLET ORAL at 06:08

## 2023-08-24 RX ADMIN — HEPARIN SODIUM 5000 UNITS: 5000 INJECTION INTRAVENOUS; SUBCUTANEOUS at 02:08

## 2023-08-24 RX ADMIN — POTASSIUM & SODIUM PHOSPHATES POWDER PACK 280-160-250 MG 2 PACKET: 280-160-250 PACK at 09:08

## 2023-08-24 NOTE — PLAN OF CARE
OT POC reviewed, goals remain appropriate  Problem: Occupational Therapy  Goal: Occupational Therapy Goal  Description: Goals to be met by: 9/23/23     Patient will increase functional independence with ADLs by performing:    UE Dressing with Stand-by Assistance.  LE Dressing with Stand-by Assistance.  Grooming while standing at sink with Stand-by Assistance.  Supine to sit with Supervision.  Step transfer with Stand-by Assistance  Toilet transfer to toilet with Stand-by Assistance.    Outcome: Ongoing, Progressing

## 2023-08-24 NOTE — PT/OT/SLP PROGRESS
"Occupational Therapy   Co-Treatment with Physical Therapy  Patient required co-tx with PT secondary to need for multiple set of skilled hands to provide safest therapy and best outcomes.    Name: Emilia Pablo  MRN: [id number]  Admitting Diagnosis:  Thoracic spinal stenosis  2 Days Post-Op    Recommendations:     Discharge Recommendations: rehabilitation facility  Discharge Equipment Recommendations:  to be determined by next level of care  Barriers to discharge:  Decreased caregiver support    Assessment:     Emilia Pablo is a 78 y.o. female with a medical diagnosis of Thoracic spinal stenosis.  She presents with the following performance deficits affecting function: weakness, impaired endurance, impaired self care skills, impaired functional mobility, gait instability, impaired balance, decreased coordination, decreased upper extremity function, decreased lower extremity function, pain, impaired cardiopulmonary response to activity. Pt's BP remained stable throughout session, no c/o dizziness. Pt with good participation, limited by fear of falling, B LE ataxia and weakness. Pt is functioning below baseline, is a high fall risk at this time. Pt would benefit from inpatient rehab when medically stable to facilitate safe return to PLOF, maximize functional independence and quality of life, and decrease caregiver burden.    Rehab Prognosis:  Good; patient would benefit from acute skilled OT services to address these deficits and reach maximum level of function.       Plan:     Patient to be seen 4 x/week to address the above listed problems via self-care/home management, therapeutic activities, therapeutic exercises, neuromuscular re-education  Plan of Care Expires: 09/23/23  Plan of Care Reviewed with: patient    Subjective     Chief Complaint: Pain  Patient/Family Comments/goals: "Don't let me fall"  Pain/Comfort:  Pain Rating 1: 6/10  Location - Orientation 1: generalized  Location 1: back  Pain Addressed 1: " Reposition, Distraction  Pain Rating Post-Intervention 1: 6/10  Pain Rating 2: 6/10  Location - Side 2: Bilateral  Location - Orientation 2: generalized  Location 2: hip  Pain Addressed 2: Reposition, Distraction  Pain Rating Post-Intervention 2: 6/10    Objective:     Communicated with: RN prior to session.  Patient found HOB elevated with bed alarm, blood pressure cuff, hemovac, wound vac, pulse ox (continuous), telemetry, peripheral IV upon OT entry to room.    General Precautions: Standard, fall    Orthopedic Precautions:spinal precautions  Braces: TLSO  Respiratory Status: Room air     Occupational Performance:     Bed Mobility:    Patient completed Rolling/Turning to Right with maximal assistance  Patient completed Scooting/Bridging with maximal assistance  Patient completed Supine to Sit with maximal assistance  Patient completed Sit to Supine with maximal assistance     Functional Mobility/Transfers:  Patient completed Sit <> Stand Transfer:  Trial 1: Partial STS from EOB with Max A of 2, RW. Pt's BLEs slid anteriorly so therapists lowered pt onto onto bed. OT and PT then donned pt's shoes for improved traction  Trials 2 & 3: Max A of 2 from EOB with RW.  Functional Mobility: Pt able to take 2 steps forward and back with Max of 2, RW to promote functional mobility.    Activities of Daily Living:  Grooming: supervision to comb hair, complete face hygiene seated EOB  Upper Body Dressing: maximal assistance to don gown as robe, Total A to don/doff TLSO  Lower Body Dressing: total assistance to don shoes  Toileting: dependence Purewick in place      [facility] 6 Click ADL: 16    Treatment & Education:  Pt educated on the following:  - role of OT, OT POC  - spinal precautions, including no bending, lifting >5lbs, or twisting  - log roll techniques  - application of brace and brace wearing schedule  - use of call light/importance of calling for staff assist for mobility  - All pt questions within OT scope of practice  addressed.  - Whiteboard updated       Patient left HOB elevated with all lines intact, call button in reach, bed alarm on, and RN present    GOALS:   Multidisciplinary Problems       Occupational Therapy Goals          Problem: Occupational Therapy    Goal Priority Disciplines Outcome Interventions   Occupational Therapy Goal     OT, PT/OT Ongoing, Progressing    Description: Goals to be met by: 9/23/23     Patient will increase functional independence with ADLs by performing:    UE Dressing with Stand-by Assistance.  LE Dressing with Stand-by Assistance.  Grooming while standing at sink with Stand-by Assistance.  Supine to sit with Supervision.  Step transfer with Stand-by Assistance  Toilet transfer to toilet with Stand-by Assistance.                         Time Tracking:     OT Date of Treatment: 08/24/23  OT Start Time: 1246  OT Stop Time: 1334  OT Total Time (min): 48 min    Billable Minutes:Self Care/Home Management 20  Therapeutic Activity 10  Neuromuscular Re-education 18    OT/YUE: OT          8/24/2023

## 2023-08-24 NOTE — PLAN OF CARE
Problem: Physical Therapy  Goal: Physical Therapy Goal  Description: Goals to be met by: 23     Patient will increase functional independence with mobility by performin. Supine to sit with Stand-by Assistance  2. Sit to supine with Stand-by Assistance  3. Sit to stand transfer with Contact Guard Assistance  4. Bed to chair transfer with Contact Guard Assistance using LRAD as needed  5. Gait  x 50 feet with min(A) using LRAD as needed.   6. Lower extremity exercise program x15 reps per handout, with assistance as needed    Outcome: Ongoing, Progressing

## 2023-08-24 NOTE — ASSESSMENT & PLAN NOTE
78F PHx significant for osteoporosis (Prolia), HTN, Abreu-Stickler syndrome, cervical and lumbosacral fusion (EJ, 2001 and 2008), presenting with one month of BLE weakness, urinary retention, gait instability and MRI C/T/L spine demonstrating moderate canal stenosis T10-T11 2/2 calcified disc herniation with cord signal change now s/p T10-T11 discectomy with T8-pelvis extension and revision of prior fusion     -Admitted to [facility] for close monitoring in post op setting  -Neuro checks q1hr  -Vital signs q1hr  -NSGY following  -MAP goals > 65  -SBP goals < 160  -PRN labetalol, hydralazine  -No HOB restrictions  -Post op xrays pending  -PT/INR, aPTT pending  -CBC, CMP, Mag, Phos pending, then daily   -Pain control as appropriate   -Abx: ancef while drains in place per nsgy  -HV drains per NSGY  -PT/OT/SLP  -Repeat CBC stable, trend daily, low threshold to transfuse if symptomatic  -Continued orthostatic hypotension, likely due to hypovolemia. Received 1 unit PRBC yesterday, will transfuse 2 units today with Albumin x 1.   -ICU status to closely monitor hemodynamics

## 2023-08-24 NOTE — SUBJECTIVE & OBJECTIVE
Interval History: 8/24: POD 2 s/p T10-11 discectomy w T8-L2 fusion. Neuro exam improved. Hypotensive episodes    Medications:  Continuous Infusions:      Scheduled Meds:   gabapentin  300 mg Oral QHS    heparin (porcine)  5,000 Units Subcutaneous Q8H    mupirocin   Nasal BID    polyethylene glycol  17 g Oral Daily    senna-docusate 8.6-50 mg  1 tablet Oral BID    silodosin  4 mg Oral Daily     PRN Meds:0.9%  NaCl infusion (for blood administration), 0.9%  NaCl infusion (for blood administration), acetaminophen, bisacodyL, calcium gluconate IVPB, calcium gluconate IVPB, dextrose 10%, dextrose 10%, famotidine, glucagon (human recombinant), glucose, glucose, glucose, hydrALAZINE, labetalol, magnesium oxide, magnesium oxide, melatonin, methocarbamoL, ondansetron, oxyCODONE, oxyCODONE, potassium bicarbonate, potassium bicarbonate, potassium bicarbonate, potassium, sodium phosphates, potassium, sodium phosphates, potassium, sodium phosphates     Review of Systems  Objective:     Weight: 68.5 kg (151 lb 0.2 oz)  Body mass index is 25.92 kg/m².  Vital Signs (Most Recent):  Temp: 98.5 °F (36.9 °C) (08/24/23 1501)  Pulse: 88 (08/24/23 1501)  Resp: (!) 31 (08/24/23 1501)  BP: (!) 110/53 (08/24/23 1501)  SpO2: 97 % (08/24/23 1501) Vital Signs (24h Range):  Temp:  [97.9 °F (36.6 °C)-98.6 °F (37 °C)] 98.5 °F (36.9 °C)  Pulse:  [] 88  Resp:  [13-69] 31  SpO2:  [86 %-100 %] 97 %  BP: ()/(40-90) 110/53     Date 08/24/23 0700 - 08/25/23 0659   Shift 4013-4805 2534-1947 0869-2194 24 Hour Total   INTAKE   P.O. 950   950   I.V.(mL/kg) 0(0)   0(0)   Blood 185   185   IV Piggyback 13.9   13.9   Shift Total(mL/kg) 1148.9(16.8)   1148.9(16.8)   OUTPUT   Urine(mL/kg/hr) 600(1.1)   600   Shift Total(mL/kg) 600(8.8)   600(8.8)   Weight (kg) 68.5 68.5 68.5 68.5                              Urethral Catheter 08/21/23 1530 Non-latex 16 Fr. (Active)   Site Assessment Clean;Intact 08/22/23 0200   Collection Container Urimeter 08/22/23  "0200   Securement Method secured to top of thigh w/ adhesive device 08/22/23 0200   Catheter Care Performed yes 08/22/23 0600   Reason for Continuing Urinary Catheterization Urinary retention 08/22/23 0200   CAUTI Prevention Bundle Securement Device in place with 1" slack;Intact seal between catheter & drainage tubing;Drainage bag/urimeter off the floor;Sheeting clip in use;No dependent loops or kinks;Drainage bag/urimeter not overfilled (<2/3 full);Drainage bag/urimeter below bladder 08/21/23 2355   Output (mL) 1000 mL 08/22/23 0538          Physical Exam         Neurosurgery Physical Exam  General: well developed, well nourished, no distress.   Head: normocephalic, atraumatic  Neurologic: Alert and oriented. Thought content appropriate.  GCS: Motor: 6/Verbal: 5/Eyes: 4 GCS Total: 15  Mental Status: Awake, Alert, Oriented x 4  Language: No aphasia  Speech: No dysarthria  Cranial nerves: face symmetric, tongue midline, CN II-XII grossly intact, EOMI.   Pulmonary: normal respirations, no signs of respiratory distress  Abdomen: soft, non-distended, not tender to palpation  Sensory: intact to light touch throughout  Motor Strength:   BUE 5/5  LLE 4/5 HF/KF/KE/EHL, 2/5 DF, 3/5 PF.   RLE 5/5 throughout  no anai/clon  Vascular: No LE edema.   Skin: Skin is warm, dry and intact.    Significant Labs:  Recent Labs   Lab 08/22/23 2029 08/23/23  0001 08/24/23  0101   * 132* 116*    135* 135*   K 4.0 4.2 3.4*   * 113* 108   CO2 19* 17* 21*   BUN 11 12 15   CREATININE 0.6 0.6 0.6   CALCIUM 7.0* 6.8* 7.4*   MG 2.0 1.9 1.8       Recent Labs   Lab 08/23/23  1030 08/23/23  1716 08/24/23  0101   WBC 8.40 8.11 8.27   HGB 8.3* 7.7* 7.9*   HCT 24.7* 22.7* 23.6*    170 144*       Recent Labs   Lab 08/22/23 2029   INR 1.1   APTT 23.1       Microbiology Results (last 7 days)       ** No results found for the last 168 hours. **          All pertinent labs from the last 24 hours have been reviewed.    Significant " Diagnostics:  MRI Lumbar Spine Without Contrast    Result Date: 8/22/2023  Postoperative changes L1 through S1 posterior instrumented fusion.  There is susceptibility artifact which limits evaluation. Multilevel degenerative changes as above, noting susceptibility artifact limits evaluation of the neural foramina at multiple levels.  Findings most significantly contribute to severe left neural foraminal narrowing at L5-S1.  No high-grade canal stenosis. Stable fluid collection in the subcutaneous tissues at the operative bed. Additional findings discussed above and identified on same day thoracic spine MRI. Electronically signed by resident: Juan Wright Date:    08/22/2023 Time:    00:09 Electronically signed by: Ayush Back MD Date:    08/22/2023 Time:    01:15    US Abdomen Complete    Result Date: 8/22/2023  Hepatic cyst in the left lobe which correlates with MRI T-spine from same date.  No suspicious focal liver lesions. Cholelithiasis.  Mild gallbladder wall thickening without other convincing signs of acute cholecystitis. Left kidney simple cyst. Electronically signed by resident: Juan Wright Date:    08/21/2023 Time:    23:40 Electronically signed by: Ayush Back MD Date:    08/22/2023 Time:    00:20    US Lower Extremity Veins Bilateral    Result Date: 8/21/2023  1. No evidence of DVT bilaterally. 2. Baker cyst in the right popliteal fossa. Electronically signed by: Armando Camarillo Date:    08/21/2023 Time:    23:11    X-Ray Chest AP Portable    Result Date: 8/21/2023  1. No convincing acute cardiopulmonary process noting coarse interstitial attenuation suggests underlying COPD/emphysema.  Correlation is advised. Electronically signed by: Jovan Cartagena MD Date:    08/21/2023 Time:    19:28    CT Lumbar Spine Without Contrast    Result Date: 8/21/2023  1. High attenuating lobular focus lies posterior to the T10 vertebral body, correlating with low signal focus on previous MRI.  This is suspected to  reflect herniated nucleus polyposis in the setting of advanced degenerative disc disease as there is vacuum disc phenomenon at the T10-T11 disc space.  Although there is a punctate focus of air in the region, this is likely related to vacuum disc phenomenon extending from the joint space and left facet rather than infectious process.  No significant soft tissue edema in the region to support a diagnosis of infection.  Correlation however is advised, and correlation with patient's symptomatology and findings on prior MRI advised. 2. Evolving surgical changes of the lumbar spine as described noting resolution of prior postoperative fluid collection.  No definite fracture although evaluation for the same is significantly limited given the extent of motion artifact. 3. Please see above for several additional findings. Electronically signed by: Jovan Cartagena MD Date:    08/21/2023 Time:    18:41    CT Thoracic Spine Without Contrast    Result Date: 8/21/2023  1. High attenuating lobular focus lies posterior to the T10 vertebral body, correlating with low signal focus on previous MRI.  This is suspected to reflect herniated nucleus polyposis in the setting of advanced degenerative disc disease as there is vacuum disc phenomenon at the T10-T11 disc space.  Although there is a punctate focus of air in the region, this is likely related to vacuum disc phenomenon extending from the joint space and left facet rather than infectious process.  No significant soft tissue edema in the region to support a diagnosis of infection.  Correlation however is advised, and correlation with patient's symptomatology and findings on prior MRI advised. 2. Evolving surgical changes of the lumbar spine as described noting resolution of prior postoperative fluid collection.  No definite fracture although evaluation for the same is significantly limited given the extent of motion artifact. 3. Please see above for several additional findings.  Electronically signed by: Jovan Cartagena MD Date:    08/21/2023 Time:    18:41    MRI Cervical Spine Without Contrast    Result Date: 8/21/2023  1. Postoperative change of ACDF at C5-C7. 2. Multilevel degenerative changes detailed above.  Severe spinal canal stenosis noted at C3-C4, moderate at C2-C3, C4-C5, and C6-C7.  Moderate to severe neural foraminal narrowing noted at C3-C5 and C6-T1. Electronically signed by: Santos Pacheco MD Date:    08/21/2023 Time:    15:49    XR Cervical Spine AP Lateral w/ Flex Ext and Swimmers Flex Ext Without Odontoid    Result Date: 8/21/2023  No fracture.  Additional findings above. Electronically signed by: Trell Church MD Date:    08/21/2023 Time:    15:41    MRI Thoracic Spine Without Contrast    Result Date: 8/21/2023  1. Disc extrusion likely containing vacuum phenomenon or calcification at T10-T11 resulting in severe spinal canal stenosis.  Associated spinal cord edema or myelomalacia noted. 2. Fluid signal within the T10-T11 disc space favored to be degenerative.  Absence of endplate erosion and probable associated vacuum phenomenon favor degenerative etiology.  Recommend further evaluation with CT as well as clinical assessment. 3. Several T2 hyperintense lesions within the liver, incompletely characterized.  Recommend abdominal ultrasound for further evaluation. Electronically signed by: Santos Pacheco MD Date:    08/21/2023 Time:    15:10    X-ray Lumbar Spine Flexion And Extension Only    Result Date: 8/21/2023  Allowing for the above limitations, flexion and extension lateral views of the lumbar spine demonstrate no subluxation. Electronically signed by: Kenny Rodrigez Date:    08/21/2023 Time:    14:29    X-Ray Hip 2 or 3 views Left (with Pelvis when performed)    Result Date: 8/21/2023  Allowing for the above limitations, no acute radiographic abnormality is demonstrated in the left total hip prosthesis or visualized portion of the osseous pelvic ring. Partially  visualized right total hip prosthesis. Electronically signed by: Kenny Rodrigez Date:    08/21/2023 Time:    14:20

## 2023-08-24 NOTE — SUBJECTIVE & OBJECTIVE
Review of Systems: Review of Systems   Constitutional:         Fatigue improving   Respiratory:  Negative for cough, sputum production and shortness of breath.    Cardiovascular:  Negative for chest pain and palpitations.   Gastrointestinal:  Negative for heartburn, nausea and vomiting.   Genitourinary:         Retention   Neurological:  Positive for sensory change, focal weakness and weakness. Negative for dizziness, speech change and headaches.     Vitals:   Temp: 98.1 °F (36.7 °C)  Pulse: 84  Rhythm: (P) normal sinus rhythm  BP: (!) 117/58  MAP (mmHg): 74  Resp: (!) 29  SpO2: (!) 93 %    Temp  Min: 97.9 °F (36.6 °C)  Max: 98.6 °F (37 °C)  Pulse  Min: 71  Max: 115  BP  Min: 85/40  Max: 144/72  MAP (mmHg)  Min: 58  Max: 98  Resp  Min: 13  Max: 47  SpO2  Min: 93 %  Max: 100 %    08/23 0701 - 08/24 0700  In: 531.2 [P.O.:180]  Out: 1720 [Urine:1425; Drains:295]   Unmeasured Output  Stool Occurrence: 0     Examination:   Constitutional: Well-nourished and -developed. No apparent distress.   Eyes: Conjunctiva clear, anicteric. Lids no lesions.  Head/Ears/Nose/Mouth/Throat/Neck: Moist mucous membranes. External ears, nose atraumatic.   Cardiovascular: Regular rhythm. No leg edema.  Respiratory: Comfortable respirations. Clear to auscultation.  Gastrointestinal: Soft, nondistended, nontender. + bowel sounds.    Neurologic:  -GCS E 4 V 5 M 6  -Alert. Oriented to person, place, and time. Speech fluent. Follows commands.  -Cranial nerves: EOM intact, PERRL, no facial droop, + cough  -Motor: BUE 5/5 strength, RLE 4/5, LLE 2/5, chronic L foot drop  -Sensation: Decreased to BLE    Medications:   Continuous Scheduledgabapentin, 300 mg, QHS  heparin (porcine), 5,000 Units, Q8H  mupirocin, , BID  polyethylene glycol, 17 g, Daily  senna-docusate 8.6-50 mg, 1 tablet, BID  silodosin, 4 mg, Daily    PRN0.9%  NaCl infusion (for blood administration), , Q24H PRN  0.9%  NaCl infusion (for blood administration), , Q24H PRN  acetaminophen,  "650 mg, Q4H PRN  bisacodyL, 10 mg, Daily PRN  calcium gluconate IVPB, 1 g, PRN  calcium gluconate IVPB, 1 g, PRN  calcium gluconate IVPB, 1 g, PRN  dextrose 10%, 12.5 g, PRN  dextrose 10%, 25 g, PRN  famotidine, 20 mg, Daily PRN  glucagon (human recombinant), 1 mg, PRN  glucose, 16 g, PRN  glucose, 16 g, PRN  glucose, 24 g, PRN  hydrALAZINE, 10 mg, Q6H PRN  labetalol, 10 mg, Q4H PRN  magnesium oxide, 800 mg, PRN  magnesium oxide, 800 mg, PRN  melatonin, 6 mg, Nightly PRN  methocarbamoL, 750 mg, Q8H PRN  ondansetron, 8 mg, Q6H PRN  oxyCODONE, 5 mg, Q6H PRN  oxyCODONE, 10 mg, Q6H PRN  potassium bicarbonate, 35 mEq, PRN  potassium bicarbonate, 50 mEq, PRN  potassium bicarbonate, 60 mEq, PRN  potassium, sodium phosphates, 2 packet, PRN  potassium, sodium phosphates, 2 packet, PRN  potassium, sodium phosphates, 2 packet, PRN       Today I independently reviewed pertinent medications, lines/drains/airways, imaging, cardiology results, laboratory results, microbiology results, notably:     ISTAT:   No results for input(s): "PH", "PCO2", "PO2", "POCSATURATED", "HCO3", "BE", "POCNA", "POCK", "POCTCO2", "POCGLU", "POCICA", "POCLAC", "SAMPLE" in the last 24 hours.     Chem:   Recent Labs   Lab 08/24/23  0101 08/24/23  0835   *  --    K 3.4*  --      --    CO2 21*  --    *  --    BUN 15  --    CREATININE 0.6  --    CALCIUM 7.4*  --    CAION  --  1.02*   MG 1.8  --    PHOS 1.5*  --    ANIONGAP 6*  --    PROT 4.3*  --    ALBUMIN 2.5*  --    BILITOT 1.2*  --    ALKPHOS 32*  --    AST 26  --    ALT 12  --        Heme:   Recent Labs   Lab 08/24/23  0101   WBC 8.27   HGB 7.9*   HCT 23.6*   *       Endo: No results for input(s): "POCTGLUCOSE" in the last 24 hours.     "

## 2023-08-24 NOTE — ASSESSMENT & PLAN NOTE
- Presented w/ new urinary retention  - Follow daily CMP  - Start silodosin  - Prakash in place for retention

## 2023-08-24 NOTE — ASSESSMENT & PLAN NOTE
On omelsartan at home  -SBP goal <160, MAP >65  -Holding BP meds due to episodes of hypotension, post op setting  -PRN labetalol, hydralazine

## 2023-08-24 NOTE — PT/OT/SLP PROGRESS
Physical Therapy Co-Treatment with OT    Patient Name:  Emilia Pablo   MRN:  [id number]    Recent Surgery: Procedure(s) (LRB):  FUSION, SPINE (N/A) 2 Days Post-Op    Patient required co-tx with OT secondary to need for multiple set of skilled hands to provide safest therapy and best outcomes.      Recommendations:     Discharge Recommendations:  rehabilitation facility   Discharge Equipment Recommendations: to be determined by next level of care   Barriers to discharge: Increased level of assist    Highest Level of Mobility: Gait 2 steps FW, 2 steps BW  Assistance Required: Max(A)X2 persons w/ RW    Assessment:     Emilia Pablo is a 78 y.o. female admitted with a medical diagnosis of Thoracic spinal stenosis.    Pt met with HOB elevated and agreeable to PT treatment. Today's PT treatment focus was on transfer training and initiation of gait training to improve function. Pt participates well but is limited by fear of falling, B LE weakness, and B LE ataxia. Pt's BP remained stable throughout session and she denied dizziness. Pt is functioning below baseline and is a high fall risk at this time.     Pt is progressing towards acute PT goals appropriately and continues to benefit from acute PT sessions. After hospital discharge, pt would benefit from inpatient rehab to maximize rehab potential.    Rehab Prognosis: Good; patient would benefit from acute skilled PT services to address these deficits and reach maximum level of function.      Plan:     During this hospitalization, patient to be seen 4 x/week to address the identified rehab impairments via gait training, therapeutic activities, therapeutic exercises, neuromuscular re-education and progress toward the following goals:    Plan of Care Expires:  09/23/23    This plan of care has been discussed with the patient/caregiver, who was included in its development and is in agreement with the identified goals and treatment plan.     Subjective     Communicated with RN  "prior to session.  Patient agreeable to participate.     Pain/Comfort:  Pain Rating 1: 6/10  Location - Orientation 1: generalized  Location 1: back  Pain Addressed 1: Reposition, Distraction  Pain Rating Post-Intervention 1: 6/10    Chief Complaint: Thoracic spinal stenosis   Patient/Family Comments/goals: "These socks are so slippery"      Objective:     Patient found HOB elevated with bed alarm, blood pressure cuff, peripheral IV, telemetry, pulse ox (continuous), hemovac, wound vac  upon PT entry to room.    General Precautions: Standard, fall   Orthopedic Precautions:spinal precautions   Braces: TLSO         Exams:    Cognition:  Patient is oriented to Person, Place, Time, Situation  Follows two-step commands   Insight to deficits/safety awareness: impaired, pt mildly impulsive    Functional Mobility:    Bed Mobility:  Supine to Sit: Maximum Assistance on R side of bed  Sit to Supine: Maximum Assistance  Rolling R: Maximum Assistance  Scooting anteriorly to EOB to plant feet on floor: Maximum Assistance    Transfers:   Sit to Stand Transfer:  Trial 1: Partial STS from EOB w/ max(a)x2 and RW. Pt's B LEs slid anteriorly so therapists lowered pt onto bed. PT and OT then donned pt's shoes for improved traction  Trials 2 and 3: Maximum Assistance and 2 persons   from EOB with RW AD              Gait:  Patient received gait training 2 steps FW and BW with Maximum Assistance and 2 persons  and rolling walker  Gait Assessment: unsteady gait, flexed posture, decreased estevan, inconsistent bilateral foot placement, and decreased bilateral knee stability  Gait Pattern Observed: Step-to, ataxic  Comments: All lines remained intact throughout ambulation trial, gait belt utilized. PT provided tactile cues for improved upright posture, instructed pt to extend B UEs on RW for support. L LE dropfoot noted with increased hip and knee FL for clearance    Balance:  Static Sit:   CGA-min(A)  at EOB  Posterior lean  Dynamic " sit:  Min Assist   Posterior lean  Static Stand:   Max(A)X2  with Rolling walker  Dynamic Stand:  Max(A)x2  with Rolling walker      Therapeutic Activities/Exercises     Patient assisted with functional mobility as noted above  Therapist educated patient on spinal precautions: no bending, lifting, or twisting. Patient expressed understanding.  Patient educated on the importance of early mobility to prevent functional decline during hospital stay  Patient was instructed to utilize staff assistance for mobility/transfers.  Patient is appropriate to transfer with dependence to medichair via drawsheet and RN/PCT assist  Patient educated on PT POC and role of PT in acute care  White board updated regarding patient's safest level of mobility with staff assistance, RN also updated.     AM-PAC 6 CLICK MOBILITY  Turning over in bed (including adjusting bedclothes, sheets and blankets)?: 3  Sitting down on and standing up from a chair with arms (e.g., wheelchair, bedside commode, etc.): 2  Moving from lying on back to sitting on the side of the bed?: 2  Moving to and from a bed to a chair (including a wheelchair)?: 2  Need to walk in hospital room?: 2  Climbing 3-5 steps with a railing?: 1  Basic Mobility Total Score: 12     Patient left HOB elevated with all lines intact, call button in reach, bed alarm on, and RN notified.        History/Goals:     PAST MEDICAL HISTORY:  Past Medical History:   Diagnosis Date    Arthritis     osteoarthritis    Hypertension     Abreu-Stickler syndrome        Past Surgical History:   Procedure Laterality Date    acdf      2 level    BACK SURGERY      laminectomy    BACK SURGERY      lumbar fusion L1-S1    CARDIAC CATHETERIZATION  2016    Normal Dr Wright; see media note; normal     SECTION      x2    EYE SURGERY Bilateral     laser   -   Nano's     JOINT REPLACEMENT Left 2013    JOINT REPLACEMENT Right 5/11/15    SPINAL FUSION N/A 2023    Procedure: FUSION,  SPINE;  Surgeon: Pedrito Chand MD;  Location: [facility] OR [address] [place] [address];  Service: Neurosurgery;  Laterality: N/A;  T8-pelvis extension and revision of fusion, T10-11 discectomy; neuromonitoring, depuy    TONSILLECTOMY         GOALS:   Multidisciplinary Problems       Physical Therapy Goals          Problem: Physical Therapy    Goal Priority Disciplines Outcome Goal Variances Interventions   Physical Therapy Goal     PT, PT/OT Ongoing, Progressing     Description: Goals to be met by: 23     Patient will increase functional independence with mobility by performin. Supine to sit with Stand-by Assistance  2. Sit to supine with Stand-by Assistance  3. Sit to stand transfer with Contact Guard Assistance  4. Bed to chair transfer with Contact Guard Assistance using LRAD as needed  5. Gait  x 150 feet with Contact Guard Assistance using LRAD as needed.   6. Lower extremity exercise program x15 reps per handout, with assistance as needed                         Time Tracking:     PT Received On: 23  PT Start Time: 1246     PT Stop Time: 1334  PT Total Time (min): 48 min     Billable Minutes: Gait Training 15, Therapeutic Activity 15, and Therapeutic Exercise 18      Niyah Galvan, PT  2023  Pager# [phone]

## 2023-08-24 NOTE — ASSESSMENT & PLAN NOTE
Emilia Pablo is a 78 y.o. female with PMH of osteoporosis- on Prolia, HTN, Abreu-Stickler Syndrome, worsening BLE weakness x 3 months who presents for evaluation of acute myelopathy.     - MRI cervical and thoracic spine shows moderate canal stenosis C4-5, T10-11 2/2 calcified disc herniation with cord signal change, multilevel foraminal stenosis.   - MRI lumbar spine with stable lumbar collection in subcutaneous tissues. No significant central stenosis. Prior MRI L spine 8/2/2023 limited evaluation 2/2 hardware artifact, but without severe canal stenosis.      POD1 T10-11 discectomy and extension/revision of prior fusion to T8-pelvis.   - continue tx for orthostasis  - meza for retention  - PT/OT/TLSO  - f/u thoracolumbar xray  - keep in ICU 1 more day for hypotension. Consider transfusion  - drain to gravity

## 2023-08-24 NOTE — PROGRESS NOTES
[facility]  Neurocritical Care  Progress Note    Admit Date: 8/21/2023  Service Date: 08/24/2023  Length of Stay: 3    Subjective:     Chief Complaint: Thoracic spinal stenosis    History of Present Illness: 78F PHx significant for osteoporosis (Prolia), HTN, Abreu-Stickler syndrome, cervical and lumbosacral fusion (EJ, 2001 and 2008), presenting with one month of BLE weakness, urinary retention, gait instability and MRI C/T/L spine demonstrating moderate canal stenosis T10-T11 2/2 calcified disc herniation with cord signal change now s/p T10-T11 discectomy with T8-pelvis extension and revision of prior fusion. Patient has chronic L foot drop since original surgeries. Prakash was placed in ED due to retention.     Patient went to OR w/ NSGY 8/22 for T8-L2 fusion with T9-11 Rene osteotomies and T10/T11 facetectomies and discectomy from right sided approach. Admit to [facility]CC post operatively.     Hospital Course: 08/23/2023: NAEON. Trending CBC. Hemodynamically stable. Low threshold to transfuse RBCs if becomes symptomatic. Discontinue Prakash and A line. Pain regimen adjusted.  08/24/2023: H&H remains less than 8 despite 1 unit PRBC yesterday. Patient with orthostatic hypotension when working with PT/OT. 2 additional units ordered. Albumin x 1. Prakash in place for retention.    Review of Systems: Review of Systems   Constitutional:         Fatigue improving   Respiratory:  Negative for cough, sputum production and shortness of breath.    Cardiovascular:  Negative for chest pain and palpitations.   Gastrointestinal:  Negative for heartburn, nausea and vomiting.   Genitourinary:         Retention   Neurological:  Positive for sensory change, focal weakness and weakness. Negative for dizziness, speech change and headaches.     Vitals:   Temp: 98.1 °F (36.7 °C)  Pulse: 84  Rhythm: (P) normal sinus rhythm  BP: (!) 117/58  MAP (mmHg): 74  Resp: (!) 29  SpO2: (!) 93 %    Temp  Min: 97.9 °F (36.6 °C)  Max: 98.6  °F (37 °C)  Pulse  Min: 71  Max: 115  BP  Min: 85/40  Max: 144/72  MAP (mmHg)  Min: 58  Max: 98  Resp  Min: 13  Max: 47  SpO2  Min: 93 %  Max: 100 %    08/23 0701 - 08/24 0700  In: 531.2 [P.O.:180]  Out: 1720 [Urine:1425; Drains:295]   Unmeasured Output  Stool Occurrence: 0     Examination:   Constitutional: Well-nourished and -developed. No apparent distress.   Eyes: Conjunctiva clear, anicteric. Lids no lesions.  Head/Ears/Nose/Mouth/Throat/Neck: Moist mucous membranes. External ears, nose atraumatic.   Cardiovascular: Regular rhythm. No leg edema.  Respiratory: Comfortable respirations. Clear to auscultation.  Gastrointestinal: Soft, nondistended, nontender. + bowel sounds.    Neurologic:  -GCS E 4 V 5 M 6  -Alert. Oriented to person, place, and time. Speech fluent. Follows commands.  -Cranial nerves: EOM intact, PERRL, no facial droop, + cough  -Motor: BUE 5/5 strength, RLE 4/5, LLE 2/5, chronic L foot drop  -Sensation: Decreased to BLE    Medications:   Continuous Scheduledgabapentin, 300 mg, QHS  heparin (porcine), 5,000 Units, Q8H  mupirocin, , BID  polyethylene glycol, 17 g, Daily  senna-docusate 8.6-50 mg, 1 tablet, BID  silodosin, 4 mg, Daily    PRN0.9%  NaCl infusion (for blood administration), , Q24H PRN  0.9%  NaCl infusion (for blood administration), , Q24H PRN  acetaminophen, 650 mg, Q4H PRN  bisacodyL, 10 mg, Daily PRN  calcium gluconate IVPB, 1 g, PRN  calcium gluconate IVPB, 1 g, PRN  calcium gluconate IVPB, 1 g, PRN  dextrose 10%, 12.5 g, PRN  dextrose 10%, 25 g, PRN  famotidine, 20 mg, Daily PRN  glucagon (human recombinant), 1 mg, PRN  glucose, 16 g, PRN  glucose, 16 g, PRN  glucose, 24 g, PRN  hydrALAZINE, 10 mg, Q6H PRN  labetalol, 10 mg, Q4H PRN  magnesium oxide, 800 mg, PRN  magnesium oxide, 800 mg, PRN  melatonin, 6 mg, Nightly PRN  methocarbamoL, 750 mg, Q8H PRN  ondansetron, 8 mg, Q6H PRN  oxyCODONE, 5 mg, Q6H PRN  oxyCODONE, 10 mg, Q6H PRN  potassium bicarbonate, 35 mEq, PRN  potassium  "bicarbonate, 50 mEq, PRN  potassium bicarbonate, 60 mEq, PRN  potassium, sodium phosphates, 2 packet, PRN  potassium, sodium phosphates, 2 packet, PRN  potassium, sodium phosphates, 2 packet, PRN       Today I independently reviewed pertinent medications, lines/drains/airways, imaging, cardiology results, laboratory results, microbiology results, notably:     ISTAT:   No results for input(s): "PH", "PCO2", "PO2", "POCSATURATED", "HCO3", "BE", "POCNA", "POCK", "POCTCO2", "POCGLU", "POCICA", "POCLAC", "SAMPLE" in the last 24 hours.     Chem:   Recent Labs   Lab 08/24/23  0101 08/24/23  0835   *  --    K 3.4*  --      --    CO2 21*  --    *  --    BUN 15  --    CREATININE 0.6  --    CALCIUM 7.4*  --    CAION  --  1.02*   MG 1.8  --    PHOS 1.5*  --    ANIONGAP 6*  --    PROT 4.3*  --    ALBUMIN 2.5*  --    BILITOT 1.2*  --    ALKPHOS 32*  --    AST 26  --    ALT 12  --        Heme:   Recent Labs   Lab 08/24/23  0101   WBC 8.27   HGB 7.9*   HCT 23.6*   *       Endo: No results for input(s): "POCTGLUCOSE" in the last 24 hours.       Assessment/Plan:     Neuro  * Thoracic spinal stenosis  78F PHx significant for osteoporosis (Prolia), HTN, Abreu-Stickler syndrome, cervical and lumbosacral fusion (EJ, 2001 and 2008), presenting with one month of BLE weakness, urinary retention, gait instability and MRI C/T/L spine demonstrating moderate canal stenosis T10-T11 2/2 calcified disc herniation with cord signal change now s/p T10-T11 discectomy with T8-pelvis extension and revision of prior fusion     -Admitted to [facility] for close monitoring in post op setting  -Neuro checks q1hr  -Vital signs q1hr  -NSGY following  -MAP goals > 65  -SBP goals < 160  -PRN labetalol, hydralazine  -No HOB restrictions  -Post op xrays pending  -PT/INR, aPTT pending  -CBC, CMP, Mag, Phos pending, then daily   -Pain control as appropriate   -Abx: ancef while drains in place per nsgy  -HV drains per NSGY  -PT/OT/SLP  -Repeat " CBC stable, trend daily, low threshold to transfuse if symptomatic  -Continued orthostatic hypotension, likely due to hypovolemia. Received 1 unit PRBC yesterday, will transfuse 2 units today with Albumin x 1.   -ICU status to closely monitor hemodynamics    Cardiac/Vascular  Hypertension  On omelsartan at home  -SBP goal <160, MAP >65  -Holding BP meds due to episodes of hypotension, post op setting  -PRN labetalol, hydralazine     Renal/  Hypocalcemia  Trending ionized Ca  Replete prn    Urinary retention  - Presented w/ new urinary retention  - Follow daily CMP  - Start silodosin  - Prakash in place for retention    Immunology/Multi System  Abreu-Stickler syndrome  Hx of    Oncology  Acute blood loss anemia  In the post op setting  S/p 1 unit PRBC 8/23  2 units PRBC today  Repeat CBC after  Monitor clinical response    Endocrine  Age-related osteoporosis without current pathological fracture  Hx of,  On prolia at home    The patient is being Prophylaxed for:  Venous Thromboembolism with: Mechanical or Chemical  Stress Ulcer with: Not Applicable   Ventilator Pneumonia with: not applicable    Activity Orders          Turn patient every 2 hours starting at 08/23 1400    Diet Adult Regular (IDDSI Level 7): Regular starting at 08/23 0802    Up in chair With meals starting at 08/23 0700    Progressive Mobility Protocol (mobilize patient to their highest level of functioning at least twice daily) starting at 08/22 2005    Elevate HOB 30 starting at 08/22 2005    Ambulate With Assistance, Post Op Day 0 If the patient arrives from PACU by 4PM, walk at least once on day of operation if alert and safe to do so. starting at 08/22 2004        Full Code     Critical condition in that Patient has a condition that poses threat to life and bodily function: hypotension, acute blood loss anemia requiring blood transfusions     40 minutes of Critical care time was spent personally by me on the following activities: development of  treatment plan with patient or surrogate and bedside caregivers, discussions with consultants, evaluation of patient's response to treatment, examination of patient, ordering and performing treatments and interventions, ordering and review of laboratory studies, ordering and review of radiographic studies, pulse oximetry, antibiotic titration if applicable, vasopressor titration if applicable, re-evaluation of patient's condition. This critical care time did not overlap with that of any other provider or involve time for any procedures. There is high probability for acute neurological change leading to clinical and possibly life-threatening deterioration requiring highest level of physician preparedness for urgent intervention.    Lynn Burns NP  Neurocritical Care  [facility]

## 2023-08-24 NOTE — ASSESSMENT & PLAN NOTE
In the post op setting  S/p 1 unit PRBC 8/23  2 units PRBC today  Repeat CBC after  Monitor clinical response

## 2023-08-24 NOTE — PROGRESS NOTES
[facility]  Neurosurgery  Progress Note    Subjective:     History of Present Illness: Emilia Pablo is a 78 y.o. female with PMH of osteoporosis- on Prolia, HTN, and Abreu-Stickler Syndrome. She has a surgical hx of 2001 cervical fusion and 2008 lumbosacral fusion - EJ - did well from surgery, chronic foot drop on left since surgery. She was evaluated by Dr. Aminta FARRIS one month ago for weakness, gait imbalance and LLE weakness and the patient was referred to Dr. Chand for further evaluation. She was not able to complete the outpatient workup due to new neuro deficits. Reports a fall trying to transfer from the chair to the toilet. She denies hitting her head/back or LOC. Reports since then she noticed worsening weakness RLE>LLE and some bowel incontinence. She feels the urge to have a BM but reports episodes of incontinence. Also notes urinary urgency x 2 weeks. Prakash placed in ED due to retention, bladder scan 660cc without the urge to void. She has nondermatomal patchy numbness in the BLE in the feet and thighs. Denies saddle anesthesia, UE weakness or numbness. NSGY consulted for evaluation.       Post-Op Info:  Procedure(s) (LRB):  FUSION, SPINE (N/A)   2 Days Post-Op     Interval History: 8/24: POD 2 s/p T10-11 discectomy w T8-L2 fusion. Neuro exam improved. Hypotensive episodes    Medications:  Continuous Infusions:      Scheduled Meds:   gabapentin  300 mg Oral QHS    heparin (porcine)  5,000 Units Subcutaneous Q8H    mupirocin   Nasal BID    polyethylene glycol  17 g Oral Daily    senna-docusate 8.6-50 mg  1 tablet Oral BID    silodosin  4 mg Oral Daily     PRN Meds:0.9%  NaCl infusion (for blood administration), 0.9%  NaCl infusion (for blood administration), acetaminophen, bisacodyL, calcium gluconate IVPB, calcium gluconate IVPB, dextrose 10%, dextrose 10%, famotidine, glucagon (human recombinant), glucose, glucose, glucose, hydrALAZINE, labetalol, magnesium oxide, magnesium  "oxide, melatonin, methocarbamoL, ondansetron, oxyCODONE, oxyCODONE, potassium bicarbonate, potassium bicarbonate, potassium bicarbonate, potassium, sodium phosphates, potassium, sodium phosphates, potassium, sodium phosphates     Review of Systems  Objective:     Weight: 68.5 kg (151 lb 0.2 oz)  Body mass index is 25.92 kg/m².  Vital Signs (Most Recent):  Temp: 98.5 °F (36.9 °C) (08/24/23 1501)  Pulse: 88 (08/24/23 1501)  Resp: (!) 31 (08/24/23 1501)  BP: (!) 110/53 (08/24/23 1501)  SpO2: 97 % (08/24/23 1501) Vital Signs (24h Range):  Temp:  [97.9 °F (36.6 °C)-98.6 °F (37 °C)] 98.5 °F (36.9 °C)  Pulse:  [] 88  Resp:  [13-69] 31  SpO2:  [86 %-100 %] 97 %  BP: ()/(40-90) 110/53     Date 08/24/23 0700 - 08/25/23 0659   Shift 4186-9695 6388-8631 9400-3773 24 Hour Total   INTAKE   P.O. 950   950   I.V.(mL/kg) 0(0)   0(0)   Blood 185   185   IV Piggyback 13.9   13.9   Shift Total(mL/kg) 1148.9(16.8)   1148.9(16.8)   OUTPUT   Urine(mL/kg/hr) 600(1.1)   600   Shift Total(mL/kg) 600(8.8)   600(8.8)   Weight (kg) 68.5 68.5 68.5 68.5                              Urethral Catheter 08/21/23 1530 Non-latex 16 Fr. (Active)   Site Assessment Clean;Intact 08/22/23 0200   Collection Container Urimeter 08/22/23 0200   Securement Method secured to top of thigh w/ adhesive device 08/22/23 0200   Catheter Care Performed yes 08/22/23 0600   Reason for Continuing Urinary Catheterization Urinary retention 08/22/23 0200   CAUTI Prevention Bundle Securement Device in place with 1" slack;Intact seal between catheter & drainage tubing;Drainage bag/urimeter off the floor;Sheeting clip in use;No dependent loops or kinks;Drainage bag/urimeter not overfilled (<2/3 full);Drainage bag/urimeter below bladder 08/21/23 2355   Output (mL) 1000 mL 08/22/23 0538         Physical Exam         Neurosurgery Physical Exam  General: well developed, well nourished, no distress.   Head: normocephalic, atraumatic  Neurologic: Alert and oriented. " Thought content appropriate.  GCS: Motor: 6/Verbal: 5/Eyes: 4 GCS Total: 15  Mental Status: Awake, Alert, Oriented x 4  Language: No aphasia  Speech: No dysarthria  Cranial nerves: face symmetric, tongue midline, CN II-XII grossly intact, EOMI.   Pulmonary: normal respirations, no signs of respiratory distress  Abdomen: soft, non-distended, not tender to palpation  Sensory: intact to light touch throughout  Motor Strength:   BUE 5/5  LLE 4/5 HF/KF/KE/EHL, 2/5 DF, 3/5 PF.   RLE 5/5 throughout  no anai/clon  Vascular: No LE edema.   Skin: Skin is warm, dry and intact.    Significant Labs:  Recent Labs   Lab 08/22/23 2029 08/23/23  0001 08/24/23  0101   * 132* 116*    135* 135*   K 4.0 4.2 3.4*   * 113* 108   CO2 19* 17* 21*   BUN 11 12 15   CREATININE 0.6 0.6 0.6   CALCIUM 7.0* 6.8* 7.4*   MG 2.0 1.9 1.8       Recent Labs   Lab 08/23/23  1030 08/23/23  1716 08/24/23  0101   WBC 8.40 8.11 8.27   HGB 8.3* 7.7* 7.9*   HCT 24.7* 22.7* 23.6*    170 144*       Recent Labs   Lab 08/22/23 2029   INR 1.1   APTT 23.1       Microbiology Results (last 7 days)       ** No results found for the last 168 hours. **          All pertinent labs from the last 24 hours have been reviewed.    Significant Diagnostics:  MRI Lumbar Spine Without Contrast    Result Date: 8/22/2023  Postoperative changes L1 through S1 posterior instrumented fusion.  There is susceptibility artifact which limits evaluation. Multilevel degenerative changes as above, noting susceptibility artifact limits evaluation of the neural foramina at multiple levels.  Findings most significantly contribute to severe left neural foraminal narrowing at L5-S1.  No high-grade canal stenosis. Stable fluid collection in the subcutaneous tissues at the operative bed. Additional findings discussed above and identified on same day thoracic spine MRI. Electronically signed by resident: Juan Wright Date:    08/22/2023 Time:    00:09 Electronically signed  by: Ayush Back MD Date:    08/22/2023 Time:    01:15    US Abdomen Complete    Result Date: 8/22/2023  Hepatic cyst in the left lobe which correlates with MRI T-spine from same date.  No suspicious focal liver lesions. Cholelithiasis.  Mild gallbladder wall thickening without other convincing signs of acute cholecystitis. Left kidney simple cyst. Electronically signed by resident: Juan Wright Date:    08/21/2023 Time:    23:40 Electronically signed by: Ayush Back MD Date:    08/22/2023 Time:    00:20    US Lower Extremity Veins Bilateral    Result Date: 8/21/2023  1. No evidence of DVT bilaterally. 2. Baker cyst in the right popliteal fossa. Electronically signed by: Armando Camarillo Date:    08/21/2023 Time:    23:11    X-Ray Chest AP Portable    Result Date: 8/21/2023  1. No convincing acute cardiopulmonary process noting coarse interstitial attenuation suggests underlying COPD/emphysema.  Correlation is advised. Electronically signed by: Jovan Cartagena MD Date:    08/21/2023 Time:    19:28    CT Lumbar Spine Without Contrast    Result Date: 8/21/2023  1. High attenuating lobular focus lies posterior to the T10 vertebral body, correlating with low signal focus on previous MRI.  This is suspected to reflect herniated nucleus polyposis in the setting of advanced degenerative disc disease as there is vacuum disc phenomenon at the T10-T11 disc space.  Although there is a punctate focus of air in the region, this is likely related to vacuum disc phenomenon extending from the joint space and left facet rather than infectious process.  No significant soft tissue edema in the region to support a diagnosis of infection.  Correlation however is advised, and correlation with patient's symptomatology and findings on prior MRI advised. 2. Evolving surgical changes of the lumbar spine as described noting resolution of prior postoperative fluid collection.  No definite fracture although evaluation for the same is  significantly limited given the extent of motion artifact. 3. Please see above for several additional findings. Electronically signed by: Jovan Cartagena MD Date:    08/21/2023 Time:    18:41    CT Thoracic Spine Without Contrast    Result Date: 8/21/2023  1. High attenuating lobular focus lies posterior to the T10 vertebral body, correlating with low signal focus on previous MRI.  This is suspected to reflect herniated nucleus polyposis in the setting of advanced degenerative disc disease as there is vacuum disc phenomenon at the T10-T11 disc space.  Although there is a punctate focus of air in the region, this is likely related to vacuum disc phenomenon extending from the joint space and left facet rather than infectious process.  No significant soft tissue edema in the region to support a diagnosis of infection.  Correlation however is advised, and correlation with patient's symptomatology and findings on prior MRI advised. 2. Evolving surgical changes of the lumbar spine as described noting resolution of prior postoperative fluid collection.  No definite fracture although evaluation for the same is significantly limited given the extent of motion artifact. 3. Please see above for several additional findings. Electronically signed by: Jovan Cartagena MD Date:    08/21/2023 Time:    18:41    MRI Cervical Spine Without Contrast    Result Date: 8/21/2023  1. Postoperative change of ACDF at C5-C7. 2. Multilevel degenerative changes detailed above.  Severe spinal canal stenosis noted at C3-C4, moderate at C2-C3, C4-C5, and C6-C7.  Moderate to severe neural foraminal narrowing noted at C3-C5 and C6-T1. Electronically signed by: Santos Pacheco MD Date:    08/21/2023 Time:    15:49    XR Cervical Spine AP Lateral w/ Flex Ext and Swimmers Flex Ext Without Odontoid    Result Date: 8/21/2023  No fracture.  Additional findings above. Electronically signed by: Trell Church MD Date:    08/21/2023 Time:    15:41    MRI  Thoracic Spine Without Contrast    Result Date: 8/21/2023  1. Disc extrusion likely containing vacuum phenomenon or calcification at T10-T11 resulting in severe spinal canal stenosis.  Associated spinal cord edema or myelomalacia noted. 2. Fluid signal within the T10-T11 disc space favored to be degenerative.  Absence of endplate erosion and probable associated vacuum phenomenon favor degenerative etiology.  Recommend further evaluation with CT as well as clinical assessment. 3. Several T2 hyperintense lesions within the liver, incompletely characterized.  Recommend abdominal ultrasound for further evaluation. Electronically signed by: Santos Pacheco MD Date:    08/21/2023 Time:    15:10    X-ray Lumbar Spine Flexion And Extension Only    Result Date: 8/21/2023  Allowing for the above limitations, flexion and extension lateral views of the lumbar spine demonstrate no subluxation. Electronically signed by: Kenny Rodrigez Date:    08/21/2023 Time:    14:29    X-Ray Hip 2 or 3 views Left (with Pelvis when performed)    Result Date: 8/21/2023  Allowing for the above limitations, no acute radiographic abnormality is demonstrated in the left total hip prosthesis or visualized portion of the osseous pelvic ring. Partially visualized right total hip prosthesis. Electronically signed by: Kenny Rodrigez Date:    08/21/2023 Time:    14:20       Assessment/Plan:     * Thoracic spinal stenosis  Emilia Pablo is a 78 y.o. female with PMH of osteoporosis- on Prolia, HTN, Abreu-Stickler Syndrome, worsening BLE weakness x 3 months who presents for evaluation of acute myelopathy.     - MRI cervical and thoracic spine shows moderate canal stenosis C4-5, T10-11 2/2 calcified disc herniation with cord signal change, multilevel foraminal stenosis.   - MRI lumbar spine with stable lumbar collection in subcutaneous tissues. No significant central stenosis. Prior MRI L spine 8/2/2023 limited evaluation 2/2 hardware artifact, but without  severe canal stenosis.      POD1 T10-11 discectomy and extension/revision of prior fusion to T8-pelvis.   - continue tx for orthostasis  - meza for retention  - PT/OT/TLSO  - f/u thoracolumbar xray  - keep in ICU 1 more day for hypotension. Consider transfusion  - drain to gravity          Abran Kapoor MD  Neurosurgery  [facility]

## 2023-08-24 NOTE — PLAN OF CARE
08/24/23 1654   Post-Acute Status   Post-Acute Authorization Placement   Post-Acute Placement Status Referrals Sent  (rehab)     Met with Pt and Pt family member to review discharge recommendation of rehab and is agreeable to possible plan. Discussed rehab criteria as being able to tolerate three hours of therapy and need for a MD to see Pt three times a week. If this changes, plan B would be a SNF facility.     Provided list of Rehab facilities in-network with patient's payor plan. Notified that blast referral sent to below listed facilities from list based on proximity to home/family support:   [facility]  2.   [facility]  3.   [facility]      Pt was instructed to identify preference.  If an additional preferred facility not listed above is identified, additional referral to be sent. If above facilities unable to accept, will send additional referrals to in-network providers.     Paula Niño, KEAGAN  Neurocritical Care   [facility]  [id number]

## 2023-08-24 NOTE — PLAN OF CARE
"[facility] Care Plan    POC reviewed with Emilia Pablo and family at 0300. Pt verbalized understanding. Questions and concerns addressed. No acute events overnight. Pt progressing toward goals. Will continue to monitor. See below and flowsheets for full assessment and VS info.           Is this a stroke patient? no    Neuro:  [place] Coma Scale  Best Eye Response: 4-->(E4) spontaneous  Best Motor Response: 6-->(M6) obeys commands  Best Verbal Response: 5-->(V5) oriented  [place] Coma Scale Score: 15  Assessment Qualifiers: patient not sedated/intubated  Pupil PERRLA: yes     24hr Temp:  [98.1 °F (36.7 °C)-98.6 °F (37 °C)]     CV:   Rhythm: normal sinus rhythm  BP goals:   SBP < 160  MAP > 65    Resp:           Plan: N/A    GI/:     Diet/Nutrition Received: regular  Last Bowel Movement: 08/22/23  Voiding Characteristics: urethral catheter (bladder)    Intake/Output Summary (Last 24 hours) at 8/24/2023 0659  Last data filed at 8/24/2023 0500  Gross per 24 hour   Intake 531.21 ml   Output 1720 ml   Net -1188.79 ml     Unmeasured Output  Stool Occurrence: 0    Labs/Accuchecks:  Recent Labs   Lab 08/24/23  0101   WBC 8.27   RBC 2.60*   HGB 7.9*   HCT 23.6*   *      Recent Labs   Lab 08/24/23  0101   *   K 3.4*   CO2 21*      BUN 15   CREATININE 0.6   ALKPHOS 32*   ALT 12   AST 26   BILITOT 1.2*      Recent Labs   Lab 08/22/23 2029   INR 1.1   APTT 23.1    No results for input(s): "CPK", "CPKMB", "TROPONINI", "MB" in the last 168 hours.    Electrolytes: Electrolytes replaced  Accuchecks: none    Gtts:      LDA/Wounds:  Lines/Drains/Airways       Drain  Duration                  Closed/Suction Drain 08/22/23 1827 Midline Back Accordion 1 day         Urethral Catheter 08/23/23 1700 16 Fr. <1 day              Peripheral Intravenous Line  Duration                  Peripheral IV - Single Lumen 08/22/23 1504 14 G  Left Forearm 1 day                  Wounds: No  Wound care consulted: No   "

## 2023-08-24 NOTE — PLAN OF CARE
"[facility] Care Plan    POC reviewed with Emilia Pablo and family at 1400. Pt verbalized understanding. Questions and concerns addressed. No acute events today. Pt progressing toward goals. Will continue to monitor. See below and flowsheets for full assessment and VS info.     2 units of blood given  Albumin given  Worked with PT, sat on side of bed   Phos replaced  Bed bath given, linens changed        Is this a stroke patient? no    Neuro:  [place] Coma Scale  Best Eye Response: 4-->(E4) spontaneous  Best Motor Response: 6-->(M6) obeys commands  Best Verbal Response: 5-->(V5) oriented  Madai Coma Scale Score: 15  Assessment Qualifiers: patient not sedated/intubated  Pupil PERRLA: yes     24 hr Temp:  [97.9 °F (36.6 °C)-98.6 °F (37 °C)]     CV:   Rhythm: normal sinus rhythm  BP goals:   SBP < 160  MAP > 65    Resp:           Plan: N/A    GI/:     Diet/Nutrition Received: regular  Last Bowel Movement: 08/22/23  Voiding Characteristics: ureteral catheter    Intake/Output Summary (Last 24 hours) at 8/24/2023 1816  Last data filed at 8/24/2023 1800  Gross per 24 hour   Intake 2139.26 ml   Output 2670 ml   Net -530.74 ml     Unmeasured Output  Stool Occurrence: 0    Labs/Accuchecks:  Recent Labs   Lab 08/24/23  1742   WBC 6.97   RBC 3.05*   HGB 9.2*   HCT 27.5*   *      Recent Labs   Lab 08/24/23  0101   *   K 3.4*   CO2 21*      BUN 15   CREATININE 0.6   ALKPHOS 32*   ALT 12   AST 26   BILITOT 1.2*      Recent Labs   Lab 08/22/23 2029   INR 1.1   APTT 23.1    No results for input(s): "CPK", "CPKMB", "TROPONINI", "MB" in the last 168 hours.    Electrolytes: Electrolytes replaced  Accuchecks: none    Gtts:      LDA/Wounds:  Lines/Drains/Airways       Drain  Duration                  Closed/Suction Drain 08/22/23 1827 Midline Back Accordion 1 day         Urethral Catheter 08/23/23 1700 16 Fr. 1 day              Peripheral Intravenous Line  Duration                  Peripheral IV - Single Lumen 08/24/23 " 0400 22 G Anterior;Left Forearm <1 day         Peripheral IV - Single Lumen 08/24/23 0930 20 G Right Antecubital <1 day                  Wounds: Yes  Wound care consulted: Yes       Problem: Infection  Goal: Absence of Infection Signs and Symptoms  Outcome: Ongoing, Progressing     Problem: Adult Inpatient Plan of Care  Goal: Plan of Care Review  Outcome: Ongoing, Progressing  Goal: Patient-Specific Goal (Individualized)  Outcome: Ongoing, Progressing  Goal: Absence of Hospital-Acquired Illness or Injury  Outcome: Ongoing, Progressing  Goal: Optimal Comfort and Wellbeing  Outcome: Ongoing, Progressing  Goal: Readiness for Transition of Care  Outcome: Ongoing, Progressing     Problem: Fall Injury Risk  Goal: Absence of Fall and Fall-Related Injury  Outcome: Ongoing, Progressing     Problem: Skin Injury Risk Increased  Goal: Skin Health and Integrity  Outcome: Ongoing, Progressing     Problem: Impaired Wound Healing  Goal: Optimal Wound Healing  Outcome: Ongoing, Progressing

## 2023-08-25 LAB
ABO + RH BLD: NORMAL
ALBUMIN SERPL BCP-MCNC: 2.7 G/DL (ref 3.5–5.2)
ALP SERPL-CCNC: 32 U/L (ref 55–135)
ALT SERPL W/O P-5'-P-CCNC: 16 U/L (ref 10–44)
ANION GAP SERPL CALC-SCNC: 6 MMOL/L (ref 8–16)
AST SERPL-CCNC: 26 U/L (ref 10–40)
BASOPHILS # BLD AUTO: 0.02 K/UL (ref 0–0.2)
BASOPHILS NFR BLD: 0.3 % (ref 0–1.9)
BILIRUB SERPL-MCNC: 1 MG/DL (ref 0.1–1)
BLD GP AB SCN CELLS X3 SERPL QL: NORMAL
BUN SERPL-MCNC: 10 MG/DL (ref 8–23)
CALCIUM SERPL-MCNC: 7.9 MG/DL (ref 8.7–10.5)
CHLORIDE SERPL-SCNC: 104 MMOL/L (ref 95–110)
CO2 SERPL-SCNC: 26 MMOL/L (ref 23–29)
CREAT SERPL-MCNC: 0.5 MG/DL (ref 0.5–1.4)
DIFFERENTIAL METHOD: ABNORMAL
EOSINOPHIL # BLD AUTO: 0 K/UL (ref 0–0.5)
EOSINOPHIL NFR BLD: 0.6 % (ref 0–8)
ERYTHROCYTE [DISTWIDTH] IN BLOOD BY AUTOMATED COUNT: 13.7 % (ref 11.5–14.5)
EST. GFR  (NO RACE VARIABLE): >60 ML/MIN/1.73 M^2
GLUCOSE SERPL-MCNC: 104 MG/DL (ref 70–110)
HCT VFR BLD AUTO: 30.2 % (ref 37–48.5)
HGB BLD-MCNC: 10.3 G/DL (ref 12–16)
IMM GRANULOCYTES # BLD AUTO: 0.02 K/UL (ref 0–0.04)
IMM GRANULOCYTES NFR BLD AUTO: 0.3 % (ref 0–0.5)
LYMPHOCYTES # BLD AUTO: 1.2 K/UL (ref 1–4.8)
LYMPHOCYTES NFR BLD: 17.3 % (ref 18–48)
MAGNESIUM SERPL-MCNC: 1.7 MG/DL (ref 1.6–2.6)
MCH RBC QN AUTO: 29.7 PG (ref 27–31)
MCHC RBC AUTO-ENTMCNC: 34.1 G/DL (ref 32–36)
MCV RBC AUTO: 87 FL (ref 82–98)
MONOCYTES # BLD AUTO: 0.7 K/UL (ref 0.3–1)
MONOCYTES NFR BLD: 10.6 % (ref 4–15)
NEUTROPHILS # BLD AUTO: 5 K/UL (ref 1.8–7.7)
NEUTROPHILS NFR BLD: 70.9 % (ref 38–73)
NRBC BLD-RTO: 0 /100 WBC
PHOSPHATE SERPL-MCNC: 2.4 MG/DL (ref 2.7–4.5)
PLATELET # BLD AUTO: 145 K/UL (ref 150–450)
PMV BLD AUTO: 10.4 FL (ref 9.2–12.9)
POTASSIUM SERPL-SCNC: 4 MMOL/L (ref 3.5–5.1)
PROT SERPL-MCNC: 4.9 G/DL (ref 6–8.4)
RBC # BLD AUTO: 3.47 M/UL (ref 4–5.4)
SODIUM SERPL-SCNC: 136 MMOL/L (ref 136–145)
SPECIMEN OUTDATE: NORMAL
WBC # BLD AUTO: 7.01 K/UL (ref 3.9–12.7)

## 2023-08-25 PROCEDURE — 99900035 HC TECH TIME PER 15 MIN (STAT)

## 2023-08-25 PROCEDURE — 25000003 PHARM REV CODE 250

## 2023-08-25 PROCEDURE — 63600175 PHARM REV CODE 636 W HCPCS: Performed by: STUDENT IN AN ORGANIZED HEALTH CARE EDUCATION/TRAINING PROGRAM

## 2023-08-25 PROCEDURE — 85025 COMPLETE CBC W/AUTO DIFF WBC: CPT | Performed by: NURSE PRACTITIONER

## 2023-08-25 PROCEDURE — 86900 BLOOD TYPING SEROLOGIC ABO: CPT | Performed by: INTERNAL MEDICINE

## 2023-08-25 PROCEDURE — 51798 US URINE CAPACITY MEASURE: CPT

## 2023-08-25 PROCEDURE — 94761 N-INVAS EAR/PLS OXIMETRY MLT: CPT

## 2023-08-25 PROCEDURE — 99233 PR SUBSEQUENT HOSPITAL CARE,LEVL III: ICD-10-PCS | Mod: FS,,, | Performed by: PHYSICIAN ASSISTANT

## 2023-08-25 PROCEDURE — 99233 SBSQ HOSP IP/OBS HIGH 50: CPT | Mod: FS,,, | Performed by: PHYSICIAN ASSISTANT

## 2023-08-25 PROCEDURE — 83735 ASSAY OF MAGNESIUM: CPT

## 2023-08-25 PROCEDURE — 80053 COMPREHEN METABOLIC PANEL: CPT

## 2023-08-25 PROCEDURE — 20600001 HC STEP DOWN PRIVATE ROOM

## 2023-08-25 PROCEDURE — 25000003 PHARM REV CODE 250: Performed by: PHYSICIAN ASSISTANT

## 2023-08-25 PROCEDURE — 25000003 PHARM REV CODE 250: Performed by: NURSE PRACTITIONER

## 2023-08-25 PROCEDURE — 84100 ASSAY OF PHOSPHORUS: CPT

## 2023-08-25 RX ORDER — SODIUM,POTASSIUM PHOSPHATES 280-250MG
2 POWDER IN PACKET (EA) ORAL EVERY 4 HOURS
Status: COMPLETED | OUTPATIENT
Start: 2023-08-25 | End: 2023-08-25

## 2023-08-25 RX ADMIN — OXYCODONE HYDROCHLORIDE 5 MG: 5 TABLET ORAL at 11:08

## 2023-08-25 RX ADMIN — POTASSIUM & SODIUM PHOSPHATES POWDER PACK 280-160-250 MG 2 PACKET: 280-160-250 PACK at 02:08

## 2023-08-25 RX ADMIN — POLYETHYLENE GLYCOL 3350 17 G: 17 POWDER, FOR SOLUTION ORAL at 09:08

## 2023-08-25 RX ADMIN — MUPIROCIN: 20 OINTMENT TOPICAL at 09:08

## 2023-08-25 RX ADMIN — HEPARIN SODIUM 5000 UNITS: 5000 INJECTION INTRAVENOUS; SUBCUTANEOUS at 09:08

## 2023-08-25 RX ADMIN — Medication 800 MG: at 06:08

## 2023-08-25 RX ADMIN — SILODOSIN 4 MG: 4 CAPSULE ORAL at 09:08

## 2023-08-25 RX ADMIN — GABAPENTIN 300 MG: 300 CAPSULE ORAL at 09:08

## 2023-08-25 RX ADMIN — HEPARIN SODIUM 5000 UNITS: 5000 INJECTION INTRAVENOUS; SUBCUTANEOUS at 05:08

## 2023-08-25 RX ADMIN — POTASSIUM & SODIUM PHOSPHATES POWDER PACK 280-160-250 MG 2 PACKET: 280-160-250 PACK at 10:08

## 2023-08-25 RX ADMIN — METHOCARBAMOL 750 MG: 750 TABLET ORAL at 11:08

## 2023-08-25 RX ADMIN — Medication 800 MG: at 10:08

## 2023-08-25 RX ADMIN — SENNOSIDES AND DOCUSATE SODIUM 1 TABLET: 50; 8.6 TABLET ORAL at 09:08

## 2023-08-25 RX ADMIN — HEPARIN SODIUM 5000 UNITS: 5000 INJECTION INTRAVENOUS; SUBCUTANEOUS at 02:08

## 2023-08-25 NOTE — NURSING
Pt arrived back to room following XRAY        Pt was escorted by RN on cardiac monitoring and ambu bag.        Patient placed back on bedside monitor with alarms audible, bed in low position with bed alarm on, call light within reach. PEGGY.          Labs/Medications/EKG/Imaging Studies

## 2023-08-25 NOTE — SUBJECTIVE & OBJECTIVE
Interval History: 8/25: Hb responded well to 2 U PRBC. Hb 10.3 this morning. Hypoension resolving. Lower extremity strength improving.     Medications:  Continuous Infusions:      Scheduled Meds:   gabapentin  300 mg Oral QHS    heparin (porcine)  5,000 Units Subcutaneous Q8H    mupirocin   Nasal BID    polyethylene glycol  17 g Oral Daily    potassium, sodium phosphates  2 packet Oral Q4H    senna-docusate 8.6-50 mg  1 tablet Oral BID    silodosin  4 mg Oral Daily     PRN Meds:acetaminophen, bisacodyL, calcium gluconate IVPB, calcium gluconate IVPB, dextrose 10%, dextrose 10%, famotidine, glucagon (human recombinant), glucose, glucose, glucose, hydrALAZINE, labetalol, magnesium oxide, magnesium oxide, melatonin, methocarbamoL, ondansetron, oxyCODONE, oxyCODONE, potassium bicarbonate, potassium bicarbonate, potassium bicarbonate, potassium, sodium phosphates, potassium, sodium phosphates, potassium, sodium phosphates     Review of Systems  Objective:     Weight: 68.5 kg (151 lb 0.2 oz)  Body mass index is 25.92 kg/m².  Vital Signs (Most Recent):  Temp: 98.6 °F (37 °C) (08/25/23 0702)  Pulse: 86 (08/25/23 0744)  Resp: (!) 22 (08/25/23 0802)  BP: 130/63 (08/25/23 0702)  SpO2: (!) 93 % (08/25/23 0744) Vital Signs (24h Range):  Temp:  [98.1 °F (36.7 °C)-98.6 °F (37 °C)] 98.6 °F (37 °C)  Pulse:  [] 86  Resp:  [13-79] 22  SpO2:  [86 %-100 %] 93 %  BP: ()/(48-82) 130/63     Date 08/25/23 0700 - 08/26/23 0659   Shift 0679-6265 1221-8696 2575-5621 24 Hour Total   INTAKE   Shift Total(mL/kg)       OUTPUT   Urine(mL/kg/hr) 490   490   Shift Total(mL/kg) 490(7.2)   490(7.2)   Weight (kg) 68.5 68.5 68.5 68.5                              Urethral Catheter 08/21/23 1530 Non-latex 16 Fr. (Active)   Site Assessment Clean;Intact 08/22/23 0200   Collection Container Urimeter 08/22/23 0200   Securement Method secured to top of thigh w/ adhesive device 08/22/23 0200   Catheter Care Performed yes 08/22/23 0600   Reason for  "Continuing Urinary Catheterization Urinary retention 08/22/23 0200   CAUTI Prevention Bundle Securement Device in place with 1" slack;Intact seal between catheter & drainage tubing;Drainage bag/urimeter off the floor;Sheeting clip in use;No dependent loops or kinks;Drainage bag/urimeter not overfilled (<2/3 full);Drainage bag/urimeter below bladder 08/21/23 2355   Output (mL) 1000 mL 08/22/23 0538          Physical Exam         Neurosurgery Physical Exam  General: well developed, well nourished, no distress.   Head: normocephalic, atraumatic  Neurologic: Alert and oriented. Thought content appropriate.  GCS: Motor: 6/Verbal: 5/Eyes: 4 GCS Total: 15  Mental Status: Awake, Alert, Oriented x 4  Language: No aphasia  Speech: No dysarthria  Cranial nerves: face symmetric, tongue midline, CN II-XII grossly intact, EOMI.   Pulmonary: normal respirations, no signs of respiratory distress  Abdomen: soft, non-distended, not tender to palpation  Sensory: intact to light touch throughout  Motor Strength:   BUE 5/5  LLE 4/5 HF/KF/KE/EHL, 3/5 DF, 4/5 PF.   RLE 5/5 throughout  no anai/clon  Vascular: No LE edema.   Skin: Skin is warm, dry and intact.    Significant Labs:  Recent Labs   Lab 08/24/23  0101 08/25/23  0217   * 104   * 136   K 3.4* 4.0    104   CO2 21* 26   BUN 15 10   CREATININE 0.6 0.5   CALCIUM 7.4* 7.9*   MG 1.8 1.7       Recent Labs   Lab 08/24/23  0101 08/24/23  1742 08/25/23  0217   WBC 8.27 6.97 7.01   HGB 7.9* 9.2* 10.3*   HCT 23.6* 27.5* 30.2*   * 118* 145*       No results for input(s): "LABPT", "INR", "APTT" in the last 48 hours.    Microbiology Results (last 7 days)       ** No results found for the last 168 hours. **          All pertinent labs from the last 24 hours have been reviewed.    Significant Diagnostics:  MRI Lumbar Spine Without Contrast    Result Date: 8/22/2023  Postoperative changes L1 through S1 posterior instrumented fusion.  There is susceptibility artifact which " limits evaluation. Multilevel degenerative changes as above, noting susceptibility artifact limits evaluation of the neural foramina at multiple levels.  Findings most significantly contribute to severe left neural foraminal narrowing at L5-S1.  No high-grade canal stenosis. Stable fluid collection in the subcutaneous tissues at the operative bed. Additional findings discussed above and identified on same day thoracic spine MRI. Electronically signed by resident: Juan Wright Date:    08/22/2023 Time:    00:09 Electronically signed by: Ayush Back MD Date:    08/22/2023 Time:    01:15    US Abdomen Complete    Result Date: 8/22/2023  Hepatic cyst in the left lobe which correlates with MRI T-spine from same date.  No suspicious focal liver lesions. Cholelithiasis.  Mild gallbladder wall thickening without other convincing signs of acute cholecystitis. Left kidney simple cyst. Electronically signed by resident: Juan Wright Date:    08/21/2023 Time:    23:40 Electronically signed by: Ayush Back MD Date:    08/22/2023 Time:    00:20    US Lower Extremity Veins Bilateral    Result Date: 8/21/2023  1. No evidence of DVT bilaterally. 2. Baker cyst in the right popliteal fossa. Electronically signed by: Armando Camarillo Date:    08/21/2023 Time:    23:11    X-Ray Chest AP Portable    Result Date: 8/21/2023  1. No convincing acute cardiopulmonary process noting coarse interstitial attenuation suggests underlying COPD/emphysema.  Correlation is advised. Electronically signed by: Jovan Cartagena MD Date:    08/21/2023 Time:    19:28    CT Lumbar Spine Without Contrast    Result Date: 8/21/2023  1. High attenuating lobular focus lies posterior to the T10 vertebral body, correlating with low signal focus on previous MRI.  This is suspected to reflect herniated nucleus polyposis in the setting of advanced degenerative disc disease as there is vacuum disc phenomenon at the T10-T11 disc space.  Although there is a punctate  focus of air in the region, this is likely related to vacuum disc phenomenon extending from the joint space and left facet rather than infectious process.  No significant soft tissue edema in the region to support a diagnosis of infection.  Correlation however is advised, and correlation with patient's symptomatology and findings on prior MRI advised. 2. Evolving surgical changes of the lumbar spine as described noting resolution of prior postoperative fluid collection.  No definite fracture although evaluation for the same is significantly limited given the extent of motion artifact. 3. Please see above for several additional findings. Electronically signed by: Jovan Cartagena MD Date:    08/21/2023 Time:    18:41    CT Thoracic Spine Without Contrast    Result Date: 8/21/2023  1. High attenuating lobular focus lies posterior to the T10 vertebral body, correlating with low signal focus on previous MRI.  This is suspected to reflect herniated nucleus polyposis in the setting of advanced degenerative disc disease as there is vacuum disc phenomenon at the T10-T11 disc space.  Although there is a punctate focus of air in the region, this is likely related to vacuum disc phenomenon extending from the joint space and left facet rather than infectious process.  No significant soft tissue edema in the region to support a diagnosis of infection.  Correlation however is advised, and correlation with patient's symptomatology and findings on prior MRI advised. 2. Evolving surgical changes of the lumbar spine as described noting resolution of prior postoperative fluid collection.  No definite fracture although evaluation for the same is significantly limited given the extent of motion artifact. 3. Please see above for several additional findings. Electronically signed by: Jovan Cartagena MD Date:    08/21/2023 Time:    18:41    MRI Cervical Spine Without Contrast    Result Date: 8/21/2023  1. Postoperative change of ACDF at  C5-C7. 2. Multilevel degenerative changes detailed above.  Severe spinal canal stenosis noted at C3-C4, moderate at C2-C3, C4-C5, and C6-C7.  Moderate to severe neural foraminal narrowing noted at C3-C5 and C6-T1. Electronically signed by: Santos Pacheco MD Date:    08/21/2023 Time:    15:49    XR Cervical Spine AP Lateral w/ Flex Ext and Swimmers Flex Ext Without Odontoid    Result Date: 8/21/2023  No fracture.  Additional findings above. Electronically signed by: Trell Church MD Date:    08/21/2023 Time:    15:41    MRI Thoracic Spine Without Contrast    Result Date: 8/21/2023  1. Disc extrusion likely containing vacuum phenomenon or calcification at T10-T11 resulting in severe spinal canal stenosis.  Associated spinal cord edema or myelomalacia noted. 2. Fluid signal within the T10-T11 disc space favored to be degenerative.  Absence of endplate erosion and probable associated vacuum phenomenon favor degenerative etiology.  Recommend further evaluation with CT as well as clinical assessment. 3. Several T2 hyperintense lesions within the liver, incompletely characterized.  Recommend abdominal ultrasound for further evaluation. Electronically signed by: Santos Pacheco MD Date:    08/21/2023 Time:    15:10    X-ray Lumbar Spine Flexion And Extension Only    Result Date: 8/21/2023  Allowing for the above limitations, flexion and extension lateral views of the lumbar spine demonstrate no subluxation. Electronically signed by: Kenny Rodrigez Date:    08/21/2023 Time:    14:29    X-Ray Hip 2 or 3 views Left (with Pelvis when performed)    Result Date: 8/21/2023  Allowing for the above limitations, no acute radiographic abnormality is demonstrated in the left total hip prosthesis or visualized portion of the osseous pelvic ring. Partially visualized right total hip prosthesis. Electronically signed by: Kenny Rodrigez Date:    08/21/2023 Time:    14:20

## 2023-08-25 NOTE — ASSESSMENT & PLAN NOTE
Emilia Pablo is a 78 y.o. female with PMH of osteoporosis- on Prolia, HTN, Abreu-Stickler Syndrome, worsening BLE weakness x 3 months who presents for evaluation of acute myelopathy.     - MRI cervical and thoracic spine shows moderate canal stenosis C4-5, T10-11 2/2 calcified disc herniation with cord signal change, multilevel foraminal stenosis.   - MRI lumbar spine with stable lumbar collection in subcutaneous tissues. No significant central stenosis. Prior MRI L spine 8/2/2023 limited evaluation 2/2 hardware artifact, but without severe canal stenosis.      POD2 T10-11 discectomy and extension/revision of prior fusion to T8-pelvis.   - ICU postop for hemodynamic mgt.   - TTF when stable per NCC  - q1 hour neurochecks in ICU, q4 on floor  - continue HV to gravity, Abx while in place  - meza for retention  - PT/OT/TLSO when up/OOB  - f/u thoracolumbar xray  - 2 U PRBC yeseterday, Hb this morning 10.3. continue to monitor daily

## 2023-08-25 NOTE — ASSESSMENT & PLAN NOTE
On omelsartan at home  -SBP goal <160, MAP >65  -Holding BP meds in post op setting  -PRN labetalol, hydralazine

## 2023-08-25 NOTE — PLAN OF CARE
SW met with Pt at bedside to obtain preference. She reported she wants to go to [facility] as her son lives and works in this area so she wants to stay close.     Paula Niño, PRINCESSW  Neurocritical Care   [facility]  [id number]

## 2023-08-25 NOTE — PROGRESS NOTES
[facility]  Neurocritical Care  Progress Note    Admit Date: 8/21/2023  Service Date: 08/25/2023  Length of Stay: 4    Subjective:     Chief Complaint: Thoracic spinal stenosis    History of Present Illness: 78F PHx significant for osteoporosis (Prolia), HTN, Abreu-Stickler syndrome, cervical and lumbosacral fusion (EJ, 2001 and 2008), presenting with one month of BLE weakness, urinary retention, gait instability and MRI C/T/L spine demonstrating moderate canal stenosis T10-T11 2/2 calcified disc herniation with cord signal change now s/p T10-T11 discectomy with T8-pelvis extension and revision of prior fusion. Patient has chronic L foot drop since original surgeries. Prakash was placed in ED due to retention.     Patient went to OR w/ NSGY 8/22 for T8-L2 fusion with T9-11 Rene osteotomies and T10/T11 facetectomies and discectomy from right sided approach. Admit to NCC post operatively.       Hospital Course: 08/23/2023: NAEON. Trending CBC. Hemodynamically stable. Low threshold to transfuse RBCs if becomes symptomatic. Discontinue Prakash and A line. Pain regimen adjusted.  08/24/2023: H&H remains less than 8 despite 1 unit PRBC yesterday. Patient with orthostatic hypotension when working with PT/OT. 2 additional units ordered. Albumin x 1. Prakash in place for retention.  8/25/2023 NAEO, drain output slowed. H/H stable and vitals normalized. SD to NSGY today       Review of Systems: Review of Systems   Constitutional:  Negative for chills and fever.   HENT:  Negative for congestion and hearing loss.    Respiratory:  Negative for cough, sputum production and shortness of breath.    Cardiovascular:  Negative for chest pain and palpitations.   Gastrointestinal:  Negative for heartburn, nausea and vomiting.   Genitourinary:         Retention   Neurological:  Positive for sensory change, focal weakness and weakness. Negative for dizziness, speech change and headaches.     Vitals:   Temp: 98.7 °F (37.1  °C)  Pulse: 80  Rhythm: normal sinus rhythm  BP: (!) 142/69  MAP (mmHg): 98  Resp: 20  SpO2: 98 %    Temp  Min: 98.4 °F (36.9 °C)  Max: 98.7 °F (37.1 °C)  Pulse  Min: 73  Max: 131  BP  Min: 93/48  Max: 145/82  MAP (mmHg)  Min: 68  Max: 104  Resp  Min: 15  Max: 79  SpO2  Min: 86 %  Max: 99 %    08/24 0701 - 08/25 0700  In: 2028.1 [P.O.:1690; I.V.:0]  Out: 2838 [Urine:2710; Drains:128]   Unmeasured Output  Stool Occurrence: 0     Examination:   Constitutional: Well-nourished and -developed. No apparent distress.   Eyes: Conjunctiva clear, anicteric. Lids no lesions.  Head/Ears/Nose/Mouth/Throat/Neck: Moist mucous membranes. External ears, nose atraumatic.   Cardiovascular: Regular rhythm. No leg edema.  Respiratory: Comfortable respirations.   Gastrointestinal: Soft, nondistended, nontender.     Neurologic:  -GCS E 4 V 5 M 6  -Alert. Oriented to person, place, and time. Speech fluent. Follows commands.  -Cranial nerves: EOM intact, PERRL, no facial droop, + cough  -Motor: BUE 5/5 strength, RLE 4/5, LLE 2/5, chronic L foot drop  -Sensation: Decreased to BLE    Medications:   Continuous Scheduledgabapentin, 300 mg, QHS  heparin (porcine), 5,000 Units, Q8H  mupirocin, , BID  polyethylene glycol, 17 g, Daily  potassium, sodium phosphates, 2 packet, Q4H  senna-docusate 8.6-50 mg, 1 tablet, BID  silodosin, 4 mg, Daily    PRNacetaminophen, 650 mg, Q4H PRN  bisacodyL, 10 mg, Daily PRN  calcium gluconate IVPB, 1 g, PRN  calcium gluconate IVPB, 1 g, PRN  dextrose 10%, 12.5 g, PRN  dextrose 10%, 25 g, PRN  famotidine, 20 mg, Daily PRN  glucagon (human recombinant), 1 mg, PRN  glucose, 16 g, PRN  glucose, 16 g, PRN  glucose, 24 g, PRN  hydrALAZINE, 10 mg, Q6H PRN  labetalol, 10 mg, Q4H PRN  magnesium oxide, 800 mg, PRN  magnesium oxide, 800 mg, PRN  melatonin, 6 mg, Nightly PRN  methocarbamoL, 750 mg, Q8H PRN  ondansetron, 8 mg, Q6H PRN  oxyCODONE, 5 mg, Q6H PRN  oxyCODONE, 10 mg, Q6H PRN  potassium bicarbonate, 35 mEq,  "PRN  potassium bicarbonate, 50 mEq, PRN  potassium bicarbonate, 60 mEq, PRN  potassium, sodium phosphates, 2 packet, PRN  potassium, sodium phosphates, 2 packet, PRN  potassium, sodium phosphates, 2 packet, PRN       Today I independently reviewed pertinent medications, lines/drains/airways, imaging, cardiology results, laboratory results, microbiology results, notably:     ISTAT:   No results for input(s): "PH", "PCO2", "PO2", "POCSATURATED", "HCO3", "BE", "POCNA", "POCK", "POCTCO2", "POCGLU", "POCICA", "POCLAC", "SAMPLE" in the last 24 hours.     Chem:   Recent Labs   Lab 08/25/23 0217      K 4.0      CO2 26      BUN 10   CREATININE 0.5   CALCIUM 7.9*   MG 1.7   PHOS 2.4*   ANIONGAP 6*   PROT 4.9*   ALBUMIN 2.7*   BILITOT 1.0   ALKPHOS 32*   AST 26   ALT 16       Heme:   Recent Labs   Lab 08/25/23 0217   WBC 7.01   HGB 10.3*   HCT 30.2*   *       Endo: No results for input(s): "POCTGLUCOSE" in the last 24 hours.     Review of Systems   Constitutional:  Negative for chills and fever.   HENT:  Negative for congestion and hearing loss.    Respiratory:  Negative for cough, sputum production and shortness of breath.    Cardiovascular:  Negative for chest pain and palpitations.   Gastrointestinal:  Negative for heartburn, nausea and vomiting.   Genitourinary:         Retention   Neurological:  Positive for sensory change, focal weakness and weakness. Negative for dizziness, speech change and headaches.     Physical Exam    Assessment/Plan:     Neuro  * Thoracic spinal stenosis  78F PHx significant for osteoporosis (Prolia), HTN, Abreu-Stickler syndrome, cervical and lumbosacral fusion (EJ, 2001 and 2008), presenting with one month of BLE weakness, urinary retention, gait instability and MRI C/T/L spine demonstrating moderate canal stenosis T10-T11 2/2 calcified disc herniation with cord signal change now s/p T10-T11 discectomy with T8-pelvis extension and revision of prior fusion "     -Admitted to [facility] for close monitoring in post op setting  -Neuro checks q4hr  -Vital signs q1hr  -NSGY following  -MAP goals > 65  -SBP goals < 160  -PRN labetalol, hydralazine  -Post op xrays pending  -Pain control as appropriate   -HV drains per NSGY  -PT/OT/SLP  -Repeat CBC stable, trend daily, low threshold to transfuse if symptomatic    -SD to NSGY    Cardiac/Vascular  Hypertension  On omelsartan at home  -SBP goal <160, MAP >65  -Holding BP meds in post op setting  -PRN labetalol, hydralazine     Renal/  Hypocalcemia  Trending ionized Ca  Replete prn    Urinary retention  - Presented w/ new urinary retention  - Follow daily CMP  - Start silodosin  - Prakash removal    Immunology/Multi System  Abreu-Stickler syndrome  Hx of    Oncology  Acute blood loss anemia  In the post op setting  S/p 1 unit PRBC 8/23  2 units PRBC 8/24  Repeat CBC stable, drain output decreased    Endocrine  Age-related osteoporosis without current pathological fracture  Hx of,  On prolia at home          The patient is being Prophylaxed for:  Venous Thromboembolism with: Chemical  Stress Ulcer with: Not Applicable   Ventilator Pneumonia with: not applicable    Activity Orders          Turn patient every 2 hours starting at 08/23 1400    Diet Adult Regular (IDDSI Level 7): Regular starting at 08/23 0802    Up in chair With meals starting at 08/23 0700    Progressive Mobility Protocol (mobilize patient to their highest level of functioning at least twice daily) starting at 08/22 2005    Elevate HOB 30 starting at 08/22 2005    Ambulate With Assistance, Post Op Day 0 If the patient arrives from PACU by 4PM, walk at least once on day of operation if alert and safe to do so. starting at 08/22 2004        Full Code    Monica Stanford PA-C  Neurocritical Care  [facility]

## 2023-08-25 NOTE — PROGRESS NOTES
[facility]  Neurosurgery  Progress Note    Subjective:     History of Present Illness: Emilia Pablo is a 78 y.o. female with PMH of osteoporosis- on Prolia, HTN, and Abreu-Stickler Syndrome. She has a surgical hx of 2001 cervical fusion and 2008 lumbosacral fusion - EJ - did well from surgery, chronic foot drop on left since surgery. She was evaluated by Dr. Aminta FARRIS one month ago for weakness, gait imbalance and LLE weakness and the patient was referred to Dr. Chand for further evaluation. She was not able to complete the outpatient workup due to new neuro deficits. Reports a fall trying to transfer from the chair to the toilet. She denies hitting her head/back or LOC. Reports since then she noticed worsening weakness RLE>LLE and some bowel incontinence. She feels the urge to have a BM but reports episodes of incontinence. Also notes urinary urgency x 2 weeks. Prakash placed in ED due to retention, bladder scan 660cc without the urge to void. She has nondermatomal patchy numbness in the BLE in the feet and thighs. Denies saddle anesthesia, UE weakness or numbness. NSGY consulted for evaluation.       Post-Op Info:  Procedure(s) (LRB):  FUSION, SPINE (N/A)   3 Days Post-Op     Interval History: 8/25: Hb responded well to 2 U PRBC. Hb 10.3 this morning. Hypoension resolving. Lower extremity strength improving.     Medications:  Continuous Infusions:      Scheduled Meds:   gabapentin  300 mg Oral QHS    heparin (porcine)  5,000 Units Subcutaneous Q8H    mupirocin   Nasal BID    polyethylene glycol  17 g Oral Daily    potassium, sodium phosphates  2 packet Oral Q4H    senna-docusate 8.6-50 mg  1 tablet Oral BID    silodosin  4 mg Oral Daily     PRN Meds:acetaminophen, bisacodyL, calcium gluconate IVPB, calcium gluconate IVPB, dextrose 10%, dextrose 10%, famotidine, glucagon (human recombinant), glucose, glucose, glucose, hydrALAZINE, labetalol, magnesium oxide, magnesium oxide, melatonin,  "methocarbamoL, ondansetron, oxyCODONE, oxyCODONE, potassium bicarbonate, potassium bicarbonate, potassium bicarbonate, potassium, sodium phosphates, potassium, sodium phosphates, potassium, sodium phosphates     Review of Systems  Objective:     Weight: 68.5 kg (151 lb 0.2 oz)  Body mass index is 25.92 kg/m².  Vital Signs (Most Recent):  Temp: 98.6 °F (37 °C) (08/25/23 0702)  Pulse: 86 (08/25/23 0744)  Resp: (!) 22 (08/25/23 0802)  BP: 130/63 (08/25/23 0702)  SpO2: (!) 93 % (08/25/23 0744) Vital Signs (24h Range):  Temp:  [98.1 °F (36.7 °C)-98.6 °F (37 °C)] 98.6 °F (37 °C)  Pulse:  [] 86  Resp:  [13-79] 22  SpO2:  [86 %-100 %] 93 %  BP: ()/(48-82) 130/63     Date 08/25/23 0700 - 08/26/23 0659   Shift 8076-2960 7941-8623 7871-1427 24 Hour Total   INTAKE   Shift Total(mL/kg)       OUTPUT   Urine(mL/kg/hr) 490   490   Shift Total(mL/kg) 490(7.2)   490(7.2)   Weight (kg) 68.5 68.5 68.5 68.5                              Urethral Catheter 08/21/23 1530 Non-latex 16 Fr. (Active)   Site Assessment Clean;Intact 08/22/23 0200   Collection Container Urimeter 08/22/23 0200   Securement Method secured to top of thigh w/ adhesive device 08/22/23 0200   Catheter Care Performed yes 08/22/23 0600   Reason for Continuing Urinary Catheterization Urinary retention 08/22/23 0200   CAUTI Prevention Bundle Securement Device in place with 1" slack;Intact seal between catheter & drainage tubing;Drainage bag/urimeter off the floor;Sheeting clip in use;No dependent loops or kinks;Drainage bag/urimeter not overfilled (<2/3 full);Drainage bag/urimeter below bladder 08/21/23 2355   Output (mL) 1000 mL 08/22/23 0538         Physical Exam         Neurosurgery Physical Exam  General: well developed, well nourished, no distress.   Head: normocephalic, atraumatic  Neurologic: Alert and oriented. Thought content appropriate.  GCS: Motor: 6/Verbal: 5/Eyes: 4 GCS Total: 15  Mental Status: Awake, Alert, Oriented x 4  Language: No " "aphasia  Speech: No dysarthria  Cranial nerves: face symmetric, tongue midline, CN II-XII grossly intact, EOMI.   Pulmonary: normal respirations, no signs of respiratory distress  Abdomen: soft, non-distended, not tender to palpation  Sensory: intact to light touch throughout  Motor Strength:   BUE 5/5  LLE 4/5 HF/KF/KE/EHL, 3/5 DF, 4/5 PF.   RLE 5/5 throughout  no anai/clon  Vascular: No LE edema.   Skin: Skin is warm, dry and intact.    Significant Labs:  Recent Labs   Lab 08/24/23  0101 08/25/23  0217   * 104   * 136   K 3.4* 4.0    104   CO2 21* 26   BUN 15 10   CREATININE 0.6 0.5   CALCIUM 7.4* 7.9*   MG 1.8 1.7       Recent Labs   Lab 08/24/23  0101 08/24/23  1742 08/25/23 0217   WBC 8.27 6.97 7.01   HGB 7.9* 9.2* 10.3*   HCT 23.6* 27.5* 30.2*   * 118* 145*       No results for input(s): "LABPT", "INR", "APTT" in the last 48 hours.    Microbiology Results (last 7 days)       ** No results found for the last 168 hours. **          All pertinent labs from the last 24 hours have been reviewed.    Significant Diagnostics:  MRI Lumbar Spine Without Contrast    Result Date: 8/22/2023  Postoperative changes L1 through S1 posterior instrumented fusion.  There is susceptibility artifact which limits evaluation. Multilevel degenerative changes as above, noting susceptibility artifact limits evaluation of the neural foramina at multiple levels.  Findings most significantly contribute to severe left neural foraminal narrowing at L5-S1.  No high-grade canal stenosis. Stable fluid collection in the subcutaneous tissues at the operative bed. Additional findings discussed above and identified on same day thoracic spine MRI. Electronically signed by resident: Juan Wright Date:    08/22/2023 Time:    00:09 Electronically signed by: Ayush Back MD Date:    08/22/2023 Time:    01:15    US Abdomen Complete    Result Date: 8/22/2023  Hepatic cyst in the left lobe which correlates with MRI T-spine from " same date.  No suspicious focal liver lesions. Cholelithiasis.  Mild gallbladder wall thickening without other convincing signs of acute cholecystitis. Left kidney simple cyst. Electronically signed by resident: Juan Wright Date:    08/21/2023 Time:    23:40 Electronically signed by: Ayush Back MD Date:    08/22/2023 Time:    00:20    US Lower Extremity Veins Bilateral    Result Date: 8/21/2023  1. No evidence of DVT bilaterally. 2. Baker cyst in the right popliteal fossa. Electronically signed by: Armando Camarillo Date:    08/21/2023 Time:    23:11    X-Ray Chest AP Portable    Result Date: 8/21/2023  1. No convincing acute cardiopulmonary process noting coarse interstitial attenuation suggests underlying COPD/emphysema.  Correlation is advised. Electronically signed by: Jovan Cartagena MD Date:    08/21/2023 Time:    19:28    CT Lumbar Spine Without Contrast    Result Date: 8/21/2023  1. High attenuating lobular focus lies posterior to the T10 vertebral body, correlating with low signal focus on previous MRI.  This is suspected to reflect herniated nucleus polyposis in the setting of advanced degenerative disc disease as there is vacuum disc phenomenon at the T10-T11 disc space.  Although there is a punctate focus of air in the region, this is likely related to vacuum disc phenomenon extending from the joint space and left facet rather than infectious process.  No significant soft tissue edema in the region to support a diagnosis of infection.  Correlation however is advised, and correlation with patient's symptomatology and findings on prior MRI advised. 2. Evolving surgical changes of the lumbar spine as described noting resolution of prior postoperative fluid collection.  No definite fracture although evaluation for the same is significantly limited given the extent of motion artifact. 3. Please see above for several additional findings. Electronically signed by: Jovan Cartagena MD Date:    08/21/2023  Time:    18:41    CT Thoracic Spine Without Contrast    Result Date: 8/21/2023  1. High attenuating lobular focus lies posterior to the T10 vertebral body, correlating with low signal focus on previous MRI.  This is suspected to reflect herniated nucleus polyposis in the setting of advanced degenerative disc disease as there is vacuum disc phenomenon at the T10-T11 disc space.  Although there is a punctate focus of air in the region, this is likely related to vacuum disc phenomenon extending from the joint space and left facet rather than infectious process.  No significant soft tissue edema in the region to support a diagnosis of infection.  Correlation however is advised, and correlation with patient's symptomatology and findings on prior MRI advised. 2. Evolving surgical changes of the lumbar spine as described noting resolution of prior postoperative fluid collection.  No definite fracture although evaluation for the same is significantly limited given the extent of motion artifact. 3. Please see above for several additional findings. Electronically signed by: Jovan Cartagena MD Date:    08/21/2023 Time:    18:41    MRI Cervical Spine Without Contrast    Result Date: 8/21/2023  1. Postoperative change of ACDF at C5-C7. 2. Multilevel degenerative changes detailed above.  Severe spinal canal stenosis noted at C3-C4, moderate at C2-C3, C4-C5, and C6-C7.  Moderate to severe neural foraminal narrowing noted at C3-C5 and C6-T1. Electronically signed by: Santos Pacheco MD Date:    08/21/2023 Time:    15:49    XR Cervical Spine AP Lateral w/ Flex Ext and Swimmers Flex Ext Without Odontoid    Result Date: 8/21/2023  No fracture.  Additional findings above. Electronically signed by: Trell Church MD Date:    08/21/2023 Time:    15:41    MRI Thoracic Spine Without Contrast    Result Date: 8/21/2023  1. Disc extrusion likely containing vacuum phenomenon or calcification at T10-T11 resulting in severe spinal canal  stenosis.  Associated spinal cord edema or myelomalacia noted. 2. Fluid signal within the T10-T11 disc space favored to be degenerative.  Absence of endplate erosion and probable associated vacuum phenomenon favor degenerative etiology.  Recommend further evaluation with CT as well as clinical assessment. 3. Several T2 hyperintense lesions within the liver, incompletely characterized.  Recommend abdominal ultrasound for further evaluation. Electronically signed by: Santos Pacheco MD Date:    08/21/2023 Time:    15:10    X-ray Lumbar Spine Flexion And Extension Only    Result Date: 8/21/2023  Allowing for the above limitations, flexion and extension lateral views of the lumbar spine demonstrate no subluxation. Electronically signed by: Kenny Rodrigez Date:    08/21/2023 Time:    14:29    X-Ray Hip 2 or 3 views Left (with Pelvis when performed)    Result Date: 8/21/2023  Allowing for the above limitations, no acute radiographic abnormality is demonstrated in the left total hip prosthesis or visualized portion of the osseous pelvic ring. Partially visualized right total hip prosthesis. Electronically signed by: Kenny Rodrigez Date:    08/21/2023 Time:    14:20       Assessment/Plan:     * Thoracic spinal stenosis  Emilia Pablo is a 78 y.o. female with PMH of osteoporosis- on Prolia, HTN, Abreu-Stickler Syndrome, worsening BLE weakness x 3 months who presents for evaluation of acute myelopathy.     - MRI cervical and thoracic spine shows moderate canal stenosis C4-5, T10-11 2/2 calcified disc herniation with cord signal change, multilevel foraminal stenosis.   - MRI lumbar spine with stable lumbar collection in subcutaneous tissues. No significant central stenosis. Prior MRI L spine 8/2/2023 limited evaluation 2/2 hardware artifact, but without severe canal stenosis.      POD2 T10-11 discectomy and extension/revision of prior fusion to T8-pelvis.   - ICU postop for hemodynamic mgt.   - TTF when stable per NCC  - q1  hour neurochecks in ICU, q4 on floor  - continue HV to gravity, Abx while in place  - meza for retention  - PT/OT/TLSO when up/OOB  - f/u thoracolumbar xray  - 2 U PRBC yeseterday, Hb this morning 10.3. continue to monitor daily           Elvin Lara MD  Neurosurgery  [facility]

## 2023-08-25 NOTE — ASSESSMENT & PLAN NOTE
78F PHx significant for osteoporosis (Prolia), HTN, Abreu-Stickler syndrome, cervical and lumbosacral fusion (EJ, 2001 and 2008), presenting with one month of BLE weakness, urinary retention, gait instability and MRI C/T/L spine demonstrating moderate canal stenosis T10-T11 2/2 calcified disc herniation with cord signal change now s/p T10-T11 discectomy with T8-pelvis extension and revision of prior fusion     -Admitted to [facility] for close monitoring in post op setting  -Neuro checks q4hr  -Vital signs q1hr  -NSGY following  -MAP goals > 65  -SBP goals < 160  -PRN labetalol, hydralazine  -Post op xrays pending  -Pain control as appropriate   -HV drains per NSGY  -PT/OT/SLP  -Repeat CBC stable, trend daily, low threshold to transfuse if symptomatic    -SD to NSGY

## 2023-08-25 NOTE — PLAN OF CARE
"[facility] Care Plan    POC reviewed with Emilia Pablo and family at 0300. Pt verbalized understanding. Questions and concerns addressed. No acute events overnight. Pt progressing toward goals. Will continue to monitor. See below and flowsheets for full assessment and VS info.           Is this a stroke patient? no    Neuro:  [place] Coma Scale  Best Eye Response: 4-->(E4) spontaneous  Best Motor Response: 6-->(M6) obeys commands  Best Verbal Response: 5-->(V5) oriented  [place] Coma Scale Score: 15  Assessment Qualifiers: patient not sedated/intubated  Pupil PERRLA: yes     24hr Temp:  [97.9 °F (36.6 °C)-98.6 °F (37 °C)]     CV:   Rhythm: normal sinus rhythm  BP goals:   SBP < 160  MAP > 65    Resp:           Plan: N/A    GI/:     Diet/Nutrition Received: regular  Last Bowel Movement: 08/22/23  Voiding Characteristics: urethral catheter (bladder)    Intake/Output Summary (Last 24 hours) at 8/25/2023 0406  Last data filed at 8/25/2023 0205  Gross per 24 hour   Intake 2028.05 ml   Output 2575 ml   Net -546.95 ml     Unmeasured Output  Stool Occurrence: 0    Labs/Accuchecks:  Recent Labs   Lab 08/25/23 0217   WBC 7.01   RBC 3.47*   HGB 10.3*   HCT 30.2*   *      Recent Labs   Lab 08/25/23 0217      K 4.0   CO2 26      BUN 10   CREATININE 0.5   ALKPHOS 32*   ALT 16   AST 26   BILITOT 1.0      Recent Labs   Lab 08/22/23 2029   INR 1.1   APTT 23.1    No results for input(s): "CPK", "CPKMB", "TROPONINI", "MB" in the last 168 hours.    Electrolytes: Electrolytes replaced  Accuchecks: none    Gtts:      LDA/Wounds:  Lines/Drains/Airways       Drain  Duration                  Closed/Suction Drain 08/22/23 1827 Midline Back Accordion 2 days         Urethral Catheter 08/23/23 1700 16 Fr. 1 day              Peripheral Intravenous Line  Duration                  Peripheral IV - Single Lumen 08/24/23 0400 22 G Anterior;Left Forearm 1 day         Peripheral IV - Single Lumen 08/24/23 0930 20 G Right Antecubital " <1 day                  Wounds: No  Wound care consulted: No

## 2023-08-25 NOTE — ASSESSMENT & PLAN NOTE
In the post op setting  S/p 1 unit PRBC 8/23  2 units PRBC 8/24  Repeat CBC stable, drain output decreased

## 2023-08-25 NOTE — PLAN OF CARE
"[facility] Care Plan    POC reviewed with Emilia Pablo at 1400. Pt verbalized understanding. Questions and concerns addressed. No acute events today. Pt progressing toward goals. Will continue to monitor. See below and flowsheets for full assessment and VS info.     Spinal xray done today.   Prakash removed, bladder scan and straight cath x1  Transfer to step down orders, pending bed  PRN pain management x1.      Is this a stroke patient? no    Neuro:  [place] Coma Scale  Best Eye Response: 4-->(E4) spontaneous  Best Motor Response: 6-->(M6) obeys commands  Best Verbal Response: 5-->(V5) oriented  [place] Coma Scale Score: 15  Assessment Qualifiers: patient not sedated/intubated  Pupil PERRLA: yes     24 hr Temp:  [98.4 °F (36.9 °C)-98.7 °F (37.1 °C)]     CV:   Rhythm: normal sinus rhythm  BP goals:   SBP < 160  MAP > 65    Resp:           Plan: N/A    GI/:     Diet/Nutrition Received: regular  Last Bowel Movement: 08/22/23  Voiding Characteristics: external catheter    Intake/Output Summary (Last 24 hours) at 8/25/2023 1849  Last data filed at 8/25/2023 1702  Gross per 24 hour   Intake 720 ml   Output 2733 ml   Net -2013 ml     Unmeasured Output  Stool Occurrence: 0    Labs/Accuchecks:  Recent Labs   Lab 08/25/23 0217   WBC 7.01   RBC 3.47*   HGB 10.3*   HCT 30.2*   *      Recent Labs   Lab 08/25/23 0217      K 4.0   CO2 26      BUN 10   CREATININE 0.5   ALKPHOS 32*   ALT 16   AST 26   BILITOT 1.0      Recent Labs   Lab 08/22/23 2029   INR 1.1   APTT 23.1    No results for input(s): "CPK", "CPKMB", "TROPONINI", "MB" in the last 168 hours.    Electrolytes: Electrolytes replaced  Accuchecks: none    Gtts:      LDA/Wounds:  Lines/Drains/Airways       Drain  Duration                  Closed/Suction Drain 08/22/23 1827 Midline Back Accordion 3 days              Peripheral Intravenous Line  Duration                  Peripheral IV - Single Lumen 08/24/23 0400 22 G Anterior;Left Forearm 1 day         " Peripheral IV - Single Lumen 08/24/23 0930 20 G Right Antecubital 1 day                  Wounds: Yes, surgical incisions  Wound care consulted: No     Problem: Infection  Goal: Absence of Infection Signs and Symptoms  Outcome: Ongoing, Progressing     Problem: Adult Inpatient Plan of Care  Goal: Plan of Care Review  Outcome: Ongoing, Progressing     Problem: Skin Injury Risk Increased  Goal: Skin Health and Integrity  Outcome: Ongoing, Progressing

## 2023-08-25 NOTE — SUBJECTIVE & OBJECTIVE
Review of Systems: Review of Systems   Constitutional:  Negative for chills and fever.   HENT:  Negative for congestion and hearing loss.    Respiratory:  Negative for cough, sputum production and shortness of breath.    Cardiovascular:  Negative for chest pain and palpitations.   Gastrointestinal:  Negative for heartburn, nausea and vomiting.   Genitourinary:         Retention   Neurological:  Positive for sensory change, focal weakness and weakness. Negative for dizziness, speech change and headaches.     Vitals:   Temp: 98.7 °F (37.1 °C)  Pulse: 80  Rhythm: normal sinus rhythm  BP: (!) 142/69  MAP (mmHg): 98  Resp: 20  SpO2: 98 %    Temp  Min: 98.4 °F (36.9 °C)  Max: 98.7 °F (37.1 °C)  Pulse  Min: 73  Max: 131  BP  Min: 93/48  Max: 145/82  MAP (mmHg)  Min: 68  Max: 104  Resp  Min: 15  Max: 79  SpO2  Min: 86 %  Max: 99 %    08/24 0701 - 08/25 0700  In: 2028.1 [P.O.:1690; I.V.:0]  Out: 2838 [Urine:2710; Drains:128]   Unmeasured Output  Stool Occurrence: 0     Examination:   Constitutional: Well-nourished and -developed. No apparent distress.   Eyes: Conjunctiva clear, anicteric. Lids no lesions.  Head/Ears/Nose/Mouth/Throat/Neck: Moist mucous membranes. External ears, nose atraumatic.   Cardiovascular: Regular rhythm. No leg edema.  Respiratory: Comfortable respirations.   Gastrointestinal: Soft, nondistended, nontender.     Neurologic:  -GCS E 4 V 5 M 6  -Alert. Oriented to person, place, and time. Speech fluent. Follows commands.  -Cranial nerves: EOM intact, PERRL, no facial droop, + cough  -Motor: BUE 5/5 strength, RLE 4/5, LLE 2/5, chronic L foot drop  -Sensation: Decreased to BLE    Medications:   Continuous Scheduledgabapentin, 300 mg, QHS  heparin (porcine), 5,000 Units, Q8H  mupirocin, , BID  polyethylene glycol, 17 g, Daily  potassium, sodium phosphates, 2 packet, Q4H  senna-docusate 8.6-50 mg, 1 tablet, BID  silodosin, 4 mg, Daily    PRNacetaminophen, 650 mg, Q4H PRN  bisacodyL, 10 mg, Daily PRN  calcium  "gluconate IVPB, 1 g, PRN  calcium gluconate IVPB, 1 g, PRN  dextrose 10%, 12.5 g, PRN  dextrose 10%, 25 g, PRN  famotidine, 20 mg, Daily PRN  glucagon (human recombinant), 1 mg, PRN  glucose, 16 g, PRN  glucose, 16 g, PRN  glucose, 24 g, PRN  hydrALAZINE, 10 mg, Q6H PRN  labetalol, 10 mg, Q4H PRN  magnesium oxide, 800 mg, PRN  magnesium oxide, 800 mg, PRN  melatonin, 6 mg, Nightly PRN  methocarbamoL, 750 mg, Q8H PRN  ondansetron, 8 mg, Q6H PRN  oxyCODONE, 5 mg, Q6H PRN  oxyCODONE, 10 mg, Q6H PRN  potassium bicarbonate, 35 mEq, PRN  potassium bicarbonate, 50 mEq, PRN  potassium bicarbonate, 60 mEq, PRN  potassium, sodium phosphates, 2 packet, PRN  potassium, sodium phosphates, 2 packet, PRN  potassium, sodium phosphates, 2 packet, PRN       Today I independently reviewed pertinent medications, lines/drains/airways, imaging, cardiology results, laboratory results, microbiology results, notably:     ISTAT:   No results for input(s): "PH", "PCO2", "PO2", "POCSATURATED", "HCO3", "BE", "POCNA", "POCK", "POCTCO2", "POCGLU", "POCICA", "POCLAC", "SAMPLE" in the last 24 hours.     Chem:   Recent Labs   Lab 08/25/23 0217      K 4.0      CO2 26      BUN 10   CREATININE 0.5   CALCIUM 7.9*   MG 1.7   PHOS 2.4*   ANIONGAP 6*   PROT 4.9*   ALBUMIN 2.7*   BILITOT 1.0   ALKPHOS 32*   AST 26   ALT 16       Heme:   Recent Labs   Lab 08/25/23 0217   WBC 7.01   HGB 10.3*   HCT 30.2*   *       Endo: No results for input(s): "POCTGLUCOSE" in the last 24 hours.     Review of Systems   Constitutional:  Negative for chills and fever.   HENT:  Negative for congestion and hearing loss.    Respiratory:  Negative for cough, sputum production and shortness of breath.    Cardiovascular:  Negative for chest pain and palpitations.   Gastrointestinal:  Negative for heartburn, nausea and vomiting.   Genitourinary:         Retention   Neurological:  Positive for sensory change, focal weakness and weakness. Negative for " dizziness, speech change and headaches.     Physical Exam

## 2023-08-26 PROCEDURE — 63600175 PHARM REV CODE 636 W HCPCS: Performed by: STUDENT IN AN ORGANIZED HEALTH CARE EDUCATION/TRAINING PROGRAM

## 2023-08-26 PROCEDURE — 25000003 PHARM REV CODE 250: Performed by: NEUROLOGICAL SURGERY

## 2023-08-26 PROCEDURE — 25000003 PHARM REV CODE 250: Performed by: NURSE PRACTITIONER

## 2023-08-26 PROCEDURE — 25000003 PHARM REV CODE 250: Performed by: PHYSICIAN ASSISTANT

## 2023-08-26 PROCEDURE — 51798 US URINE CAPACITY MEASURE: CPT

## 2023-08-26 PROCEDURE — 11000001 HC ACUTE MED/SURG PRIVATE ROOM

## 2023-08-26 PROCEDURE — 25000003 PHARM REV CODE 250: Performed by: STUDENT IN AN ORGANIZED HEALTH CARE EDUCATION/TRAINING PROGRAM

## 2023-08-26 RX ORDER — GABAPENTIN 300 MG/1
300 CAPSULE ORAL 3 TIMES DAILY
Status: DISCONTINUED | OUTPATIENT
Start: 2023-08-26 | End: 2023-08-28 | Stop reason: HOSPADM

## 2023-08-26 RX ADMIN — POLYETHYLENE GLYCOL 3350 17 G: 17 POWDER, FOR SOLUTION ORAL at 08:08

## 2023-08-26 RX ADMIN — SENNOSIDES AND DOCUSATE SODIUM 1 TABLET: 50; 8.6 TABLET ORAL at 08:08

## 2023-08-26 RX ADMIN — GABAPENTIN 300 MG: 300 CAPSULE ORAL at 02:08

## 2023-08-26 RX ADMIN — GABAPENTIN 300 MG: 300 CAPSULE ORAL at 08:08

## 2023-08-26 RX ADMIN — METHOCARBAMOL 750 MG: 750 TABLET ORAL at 08:08

## 2023-08-26 RX ADMIN — HEPARIN SODIUM 5000 UNITS: 5000 INJECTION INTRAVENOUS; SUBCUTANEOUS at 09:08

## 2023-08-26 RX ADMIN — HEPARIN SODIUM 5000 UNITS: 5000 INJECTION INTRAVENOUS; SUBCUTANEOUS at 02:08

## 2023-08-26 RX ADMIN — ACETAMINOPHEN 650 MG: 325 TABLET ORAL at 01:08

## 2023-08-26 RX ADMIN — MUPIROCIN: 20 OINTMENT TOPICAL at 08:08

## 2023-08-26 RX ADMIN — ACETAMINOPHEN 650 MG: 325 TABLET ORAL at 08:08

## 2023-08-26 RX ADMIN — HEPARIN SODIUM 5000 UNITS: 5000 INJECTION INTRAVENOUS; SUBCUTANEOUS at 06:08

## 2023-08-26 RX ADMIN — MUPIROCIN: 20 OINTMENT TOPICAL at 09:08

## 2023-08-26 RX ADMIN — GABAPENTIN 300 MG: 300 CAPSULE ORAL at 03:08

## 2023-08-26 RX ADMIN — SILODOSIN 4 MG: 4 CAPSULE ORAL at 08:08

## 2023-08-26 RX ADMIN — OXYCODONE HYDROCHLORIDE 10 MG: 10 TABLET ORAL at 02:08

## 2023-08-26 RX ADMIN — METHOCARBAMOL 750 MG: 750 TABLET ORAL at 02:08

## 2023-08-26 RX ADMIN — Medication 6 MG: at 08:08

## 2023-08-26 RX ADMIN — OXYCODONE HYDROCHLORIDE 10 MG: 10 TABLET ORAL at 04:08

## 2023-08-26 NOTE — PROGRESS NOTES
[facility] - Neurosurgery ([facility])  Neurosurgery  Progress Note    Subjective:     History of Present Illness: Emilia Pablo is a 78 y.o. female with PMH of osteoporosis- on Prolia, HTN, and Abreu-Stickler Syndrome. She has a surgical hx of 2001 cervical fusion and 2008 lumbosacral fusion - EJ - did well from surgery, chronic foot drop on left since surgery. She was evaluated by Dr. Aminta FARRIS one month ago for weakness, gait imbalance and LLE weakness and the patient was referred to Dr. Chand for further evaluation. She was not able to complete the outpatient workup due to new neuro deficits. Reports a fall trying to transfer from the chair to the toilet. She denies hitting her head/back or LOC. Reports since then she noticed worsening weakness RLE>LLE and some bowel incontinence. She feels the urge to have a BM but reports episodes of incontinence. Also notes urinary urgency x 2 weeks. Prakash placed in ED due to retention, bladder scan 660cc without the urge to void. She has nondermatomal patchy numbness in the BLE in the feet and thighs. Denies saddle anesthesia, UE weakness or numbness. NSGY consulted for evaluation.       Post-Op Info:  Procedure(s) (LRB):  FUSION, SPINE (N/A)   4 Days Post-Op     Interval History: 8/26: neuro stable but complaining of right hip pain. Left leg strength has improved a lot since surgery. Drain with about 100cc serosanguinous output. PT to work with her this weekend.         Medications:  Continuous Infusions:      Scheduled Meds:   gabapentin  300 mg Oral TID    heparin (porcine)  5,000 Units Subcutaneous Q8H    mupirocin   Nasal BID    polyethylene glycol  17 g Oral Daily    senna-docusate 8.6-50 mg  1 tablet Oral BID    silodosin  4 mg Oral Daily     PRN Meds:acetaminophen, bisacodyL, calcium gluconate IVPB, calcium gluconate IVPB, dextrose 10%, dextrose 10%, famotidine, glucagon (human recombinant), glucose, glucose, glucose, hydrALAZINE, labetalol, magnesium oxide,  "magnesium oxide, melatonin, methocarbamoL, ondansetron, oxyCODONE, oxyCODONE, potassium bicarbonate, potassium bicarbonate, potassium bicarbonate, potassium, sodium phosphates, potassium, sodium phosphates, potassium, sodium phosphates     Review of Systems  Objective:     Weight: 68.5 kg (151 lb 0.2 oz)  Body mass index is 25.92 kg/m².  Vital Signs (Most Recent):  Temp: 98.3 °F (36.8 °C) (08/26/23 0346)  Pulse: 68 (08/26/23 0747)  Resp: 20 (08/26/23 0747)  BP: 133/62 (08/26/23 0747)  SpO2: 95 % (08/26/23 0747) Vital Signs (24h Range):  Temp:  [97.8 °F (36.6 °C)-98.9 °F (37.2 °C)] 98.3 °F (36.8 °C)  Pulse:  [68-93] 68  Resp:  [12-24] 20  SpO2:  [94 %-99 %] 95 %  BP: (112-155)/(57-86) 133/62                                Urethral Catheter 08/21/23 1530 Non-latex 16 Fr. (Active)   Site Assessment Clean;Intact 08/22/23 0200   Collection Container Urimeter 08/22/23 0200   Securement Method secured to top of thigh w/ adhesive device 08/22/23 0200   Catheter Care Performed yes 08/22/23 0600   Reason for Continuing Urinary Catheterization Urinary retention 08/22/23 0200   CAUTI Prevention Bundle Securement Device in place with 1" slack;Intact seal between catheter & drainage tubing;Drainage bag/urimeter off the floor;Sheeting clip in use;No dependent loops or kinks;Drainage bag/urimeter not overfilled (<2/3 full);Drainage bag/urimeter below bladder 08/21/23 2355   Output (mL) 1000 mL 08/22/23 0538         Physical Exam         Neurosurgery Physical Exam  General: well developed, well nourished, no distress.   Head: normocephalic, atraumatic  Neurologic: Alert and oriented. Thought content appropriate.  GCS: Motor: 6/Verbal: 5/Eyes: 4 GCS Total: 15  Mental Status: Awake, Alert, Oriented x 4  Language: No aphasia  Speech: No dysarthria  Cranial nerves: face symmetric, tongue midline, CN II-XII grossly intact, EOMI.   Pulmonary: normal respirations, no signs of respiratory distress  Abdomen: soft, non-distended, not tender " "to palpation  Sensory: intact to light touch throughout  Motor Strength:   BUE 5/5  LLE 4/5 HF/KF/KE/EHL, 3/5 DF, 4/5 PF.   RLE 5/5 throughout with some pain limited hip flexion weakness  no anai/clon  Vascular: No LE edema.   Skin: Skin is warm, dry and intact.    Significant Labs:  Recent Labs   Lab 08/25/23  0217         K 4.0      CO2 26   BUN 10   CREATININE 0.5   CALCIUM 7.9*   MG 1.7       Recent Labs   Lab 08/24/23  1742 08/25/23  0217   WBC 6.97 7.01   HGB 9.2* 10.3*   HCT 27.5* 30.2*   * 145*       No results for input(s): "LABPT", "INR", "APTT" in the last 48 hours.    Microbiology Results (last 7 days)       ** No results found for the last 168 hours. **          All pertinent labs from the last 24 hours have been reviewed.    Significant Diagnostics:  MRI Lumbar Spine Without Contrast    Result Date: 8/22/2023  Postoperative changes L1 through S1 posterior instrumented fusion.  There is susceptibility artifact which limits evaluation. Multilevel degenerative changes as above, noting susceptibility artifact limits evaluation of the neural foramina at multiple levels.  Findings most significantly contribute to severe left neural foraminal narrowing at L5-S1.  No high-grade canal stenosis. Stable fluid collection in the subcutaneous tissues at the operative bed. Additional findings discussed above and identified on same day thoracic spine MRI. Electronically signed by resident: Juan Wright Date:    08/22/2023 Time:    00:09 Electronically signed by: Ayush Back MD Date:    08/22/2023 Time:    01:15    US Abdomen Complete    Result Date: 8/22/2023  Hepatic cyst in the left lobe which correlates with MRI T-spine from same date.  No suspicious focal liver lesions. Cholelithiasis.  Mild gallbladder wall thickening without other convincing signs of acute cholecystitis. Left kidney simple cyst. Electronically signed by resident: Juan Wright Date:    08/21/2023 Time:    23:40 " Electronically signed by: Ayush Back MD Date:    08/22/2023 Time:    00:20    US Lower Extremity Veins Bilateral    Result Date: 8/21/2023  1. No evidence of DVT bilaterally. 2. Baker cyst in the right popliteal fossa. Electronically signed by: Armando Camarillo Date:    08/21/2023 Time:    23:11    X-Ray Chest AP Portable    Result Date: 8/21/2023  1. No convincing acute cardiopulmonary process noting coarse interstitial attenuation suggests underlying COPD/emphysema.  Correlation is advised. Electronically signed by: Jovan Cartagena MD Date:    08/21/2023 Time:    19:28    CT Lumbar Spine Without Contrast    Result Date: 8/21/2023  1. High attenuating lobular focus lies posterior to the T10 vertebral body, correlating with low signal focus on previous MRI.  This is suspected to reflect herniated nucleus polyposis in the setting of advanced degenerative disc disease as there is vacuum disc phenomenon at the T10-T11 disc space.  Although there is a punctate focus of air in the region, this is likely related to vacuum disc phenomenon extending from the joint space and left facet rather than infectious process.  No significant soft tissue edema in the region to support a diagnosis of infection.  Correlation however is advised, and correlation with patient's symptomatology and findings on prior MRI advised. 2. Evolving surgical changes of the lumbar spine as described noting resolution of prior postoperative fluid collection.  No definite fracture although evaluation for the same is significantly limited given the extent of motion artifact. 3. Please see above for several additional findings. Electronically signed by: oJvan Cartagena MD Date:    08/21/2023 Time:    18:41    CT Thoracic Spine Without Contrast    Result Date: 8/21/2023  1. High attenuating lobular focus lies posterior to the T10 vertebral body, correlating with low signal focus on previous MRI.  This is suspected to reflect herniated nucleus polyposis  in the setting of advanced degenerative disc disease as there is vacuum disc phenomenon at the T10-T11 disc space.  Although there is a punctate focus of air in the region, this is likely related to vacuum disc phenomenon extending from the joint space and left facet rather than infectious process.  No significant soft tissue edema in the region to support a diagnosis of infection.  Correlation however is advised, and correlation with patient's symptomatology and findings on prior MRI advised. 2. Evolving surgical changes of the lumbar spine as described noting resolution of prior postoperative fluid collection.  No definite fracture although evaluation for the same is significantly limited given the extent of motion artifact. 3. Please see above for several additional findings. Electronically signed by: Jovan Cartagena MD Date:    08/21/2023 Time:    18:41    MRI Cervical Spine Without Contrast    Result Date: 8/21/2023  1. Postoperative change of ACDF at C5-C7. 2. Multilevel degenerative changes detailed above.  Severe spinal canal stenosis noted at C3-C4, moderate at C2-C3, C4-C5, and C6-C7.  Moderate to severe neural foraminal narrowing noted at C3-C5 and C6-T1. Electronically signed by: Santos Pacheco MD Date:    08/21/2023 Time:    15:49    XR Cervical Spine AP Lateral w/ Flex Ext and Swimmers Flex Ext Without Odontoid    Result Date: 8/21/2023  No fracture.  Additional findings above. Electronically signed by: Trell Church MD Date:    08/21/2023 Time:    15:41    MRI Thoracic Spine Without Contrast    Result Date: 8/21/2023  1. Disc extrusion likely containing vacuum phenomenon or calcification at T10-T11 resulting in severe spinal canal stenosis.  Associated spinal cord edema or myelomalacia noted. 2. Fluid signal within the T10-T11 disc space favored to be degenerative.  Absence of endplate erosion and probable associated vacuum phenomenon favor degenerative etiology.  Recommend further evaluation with  CT as well as clinical assessment. 3. Several T2 hyperintense lesions within the liver, incompletely characterized.  Recommend abdominal ultrasound for further evaluation. Electronically signed by: Santos Pacheco MD Date:    08/21/2023 Time:    15:10    X-ray Lumbar Spine Flexion And Extension Only    Result Date: 8/21/2023  Allowing for the above limitations, flexion and extension lateral views of the lumbar spine demonstrate no subluxation. Electronically signed by: Kenny Rodrigez Date:    08/21/2023 Time:    14:29    X-Ray Hip 2 or 3 views Left (with Pelvis when performed)    Result Date: 8/21/2023  Allowing for the above limitations, no acute radiographic abnormality is demonstrated in the left total hip prosthesis or visualized portion of the osseous pelvic ring. Partially visualized right total hip prosthesis. Electronically signed by: Kenny Rodrigez Date:    08/21/2023 Time:    14:20       Assessment/Plan:     * Thoracic spinal stenosis  Emilia Pablo is a 78 y.o. female with PMH of osteoporosis- on Prolia, HTN, Abreu-Stickler Syndrome, worsening BLE weakness x 3 months who presents for evaluation of acute myelopathy.     - MRI cervical and thoracic spine shows moderate canal stenosis C4-5, T10-11 2/2 calcified disc herniation with cord signal change, multilevel foraminal stenosis.   - MRI lumbar spine with stable lumbar collection in subcutaneous tissues. No significant central stenosis. Prior MRI L spine 8/2/2023 limited evaluation 2/2 hardware artifact, but without severe canal stenosis.      S/p T10-11 discectomy and extension/revision of prior fusion to T8-pelvis.       - admitted to neurosurgery floor status, q4 neuro checks  - continue HV, Abx while in place  - post op xrays reviewed, hardware in good position  - tolerating diet  - meza for retention  - ctm hemoglobin  - SQH for DVT ppx  - PT/OT/TLSO when up/OOB    Dispo: ongoing, pending PT recs          Gina Burgess  MD  Neurosurgery  [facility] - Neurosurgery ([facility])

## 2023-08-26 NOTE — ASSESSMENT & PLAN NOTE
Emilia Pablo is a 78 y.o. female with PMH of osteoporosis- on Prolia, HTN, Abreu-Stickler Syndrome, worsening BLE weakness x 3 months who presents for evaluation of acute myelopathy.     - MRI cervical and thoracic spine shows moderate canal stenosis C4-5, T10-11 2/2 calcified disc herniation with cord signal change, multilevel foraminal stenosis.   - MRI lumbar spine with stable lumbar collection in subcutaneous tissues. No significant central stenosis. Prior MRI L spine 8/2/2023 limited evaluation 2/2 hardware artifact, but without severe canal stenosis.      S/p T10-11 discectomy and extension/revision of prior fusion to T8-pelvis.       - admitted to neurosurgery floor status, q4 neuro checks  - continue HV, Abx while in place  - post op xrays reviewed, hardware in good position  - tolerating diet  - meza for retention  - ctm hemoglobin  - SQH for DVT ppx  - PT/OT/TLSO when up/OOB    Dispo: ongoing, pending PT recs

## 2023-08-26 NOTE — SUBJECTIVE & OBJECTIVE
"Interval History: 8/26: neuro stable but complaining of right hip pain. Left leg strength has improved a lot since surgery. Drain with about 100cc serosanguinous output. PT to work with her this weekend.         Medications:  Continuous Infusions:      Scheduled Meds:   gabapentin  300 mg Oral TID    heparin (porcine)  5,000 Units Subcutaneous Q8H    mupirocin   Nasal BID    polyethylene glycol  17 g Oral Daily    senna-docusate 8.6-50 mg  1 tablet Oral BID    silodosin  4 mg Oral Daily     PRN Meds:acetaminophen, bisacodyL, calcium gluconate IVPB, calcium gluconate IVPB, dextrose 10%, dextrose 10%, famotidine, glucagon (human recombinant), glucose, glucose, glucose, hydrALAZINE, labetalol, magnesium oxide, magnesium oxide, melatonin, methocarbamoL, ondansetron, oxyCODONE, oxyCODONE, potassium bicarbonate, potassium bicarbonate, potassium bicarbonate, potassium, sodium phosphates, potassium, sodium phosphates, potassium, sodium phosphates     Review of Systems  Objective:     Weight: 68.5 kg (151 lb 0.2 oz)  Body mass index is 25.92 kg/m².  Vital Signs (Most Recent):  Temp: 98.3 °F (36.8 °C) (08/26/23 0346)  Pulse: 68 (08/26/23 0747)  Resp: 20 (08/26/23 0747)  BP: 133/62 (08/26/23 0747)  SpO2: 95 % (08/26/23 0747) Vital Signs (24h Range):  Temp:  [97.8 °F (36.6 °C)-98.9 °F (37.2 °C)] 98.3 °F (36.8 °C)  Pulse:  [68-93] 68  Resp:  [12-24] 20  SpO2:  [94 %-99 %] 95 %  BP: (112-155)/(57-86) 133/62                                Urethral Catheter 08/21/23 1530 Non-latex 16 Fr. (Active)   Site Assessment Clean;Intact 08/22/23 0200   Collection Container Urimeter 08/22/23 0200   Securement Method secured to top of thigh w/ adhesive device 08/22/23 0200   Catheter Care Performed yes 08/22/23 0600   Reason for Continuing Urinary Catheterization Urinary retention 08/22/23 0200   CAUTI Prevention Bundle Securement Device in place with 1" slack;Intact seal between catheter & drainage tubing;Drainage bag/urimeter off the " "floor;Sheeting clip in use;No dependent loops or kinks;Drainage bag/urimeter not overfilled (<2/3 full);Drainage bag/urimeter below bladder 08/21/23 2355   Output (mL) 1000 mL 08/22/23 0538          Physical Exam         Neurosurgery Physical Exam  General: well developed, well nourished, no distress.   Head: normocephalic, atraumatic  Neurologic: Alert and oriented. Thought content appropriate.  GCS: Motor: 6/Verbal: 5/Eyes: 4 GCS Total: 15  Mental Status: Awake, Alert, Oriented x 4  Language: No aphasia  Speech: No dysarthria  Cranial nerves: face symmetric, tongue midline, CN II-XII grossly intact, EOMI.   Pulmonary: normal respirations, no signs of respiratory distress  Abdomen: soft, non-distended, not tender to palpation  Sensory: intact to light touch throughout  Motor Strength:   BUE 5/5  LLE 4/5 HF/KF/KE/EHL, 3/5 DF, 4/5 PF.   RLE 5/5 throughout with some pain limited hip flexion weakness  no anai/clon  Vascular: No LE edema.   Skin: Skin is warm, dry and intact.    Significant Labs:  Recent Labs   Lab 08/25/23  0217         K 4.0      CO2 26   BUN 10   CREATININE 0.5   CALCIUM 7.9*   MG 1.7       Recent Labs   Lab 08/24/23  1742 08/25/23  0217   WBC 6.97 7.01   HGB 9.2* 10.3*   HCT 27.5* 30.2*   * 145*       No results for input(s): "LABPT", "INR", "APTT" in the last 48 hours.    Microbiology Results (last 7 days)       ** No results found for the last 168 hours. **          All pertinent labs from the last 24 hours have been reviewed.    Significant Diagnostics:  MRI Lumbar Spine Without Contrast    Result Date: 8/22/2023  Postoperative changes L1 through S1 posterior instrumented fusion.  There is susceptibility artifact which limits evaluation. Multilevel degenerative changes as above, noting susceptibility artifact limits evaluation of the neural foramina at multiple levels.  Findings most significantly contribute to severe left neural foraminal narrowing at L5-S1.  No " high-grade canal stenosis. Stable fluid collection in the subcutaneous tissues at the operative bed. Additional findings discussed above and identified on same day thoracic spine MRI. Electronically signed by resident: Juan Wright Date:    08/22/2023 Time:    00:09 Electronically signed by: Ayush Back MD Date:    08/22/2023 Time:    01:15    US Abdomen Complete    Result Date: 8/22/2023  Hepatic cyst in the left lobe which correlates with MRI T-spine from same date.  No suspicious focal liver lesions. Cholelithiasis.  Mild gallbladder wall thickening without other convincing signs of acute cholecystitis. Left kidney simple cyst. Electronically signed by resident: Juan Wright Date:    08/21/2023 Time:    23:40 Electronically signed by: Ayush Back MD Date:    08/22/2023 Time:    00:20    US Lower Extremity Veins Bilateral    Result Date: 8/21/2023  1. No evidence of DVT bilaterally. 2. Baker cyst in the right popliteal fossa. Electronically signed by: Armando Camarillo Date:    08/21/2023 Time:    23:11    X-Ray Chest AP Portable    Result Date: 8/21/2023  1. No convincing acute cardiopulmonary process noting coarse interstitial attenuation suggests underlying COPD/emphysema.  Correlation is advised. Electronically signed by: Jovan Cartagena MD Date:    08/21/2023 Time:    19:28    CT Lumbar Spine Without Contrast    Result Date: 8/21/2023  1. High attenuating lobular focus lies posterior to the T10 vertebral body, correlating with low signal focus on previous MRI.  This is suspected to reflect herniated nucleus polyposis in the setting of advanced degenerative disc disease as there is vacuum disc phenomenon at the T10-T11 disc space.  Although there is a punctate focus of air in the region, this is likely related to vacuum disc phenomenon extending from the joint space and left facet rather than infectious process.  No significant soft tissue edema in the region to support a diagnosis of infection.   Correlation however is advised, and correlation with patient's symptomatology and findings on prior MRI advised. 2. Evolving surgical changes of the lumbar spine as described noting resolution of prior postoperative fluid collection.  No definite fracture although evaluation for the same is significantly limited given the extent of motion artifact. 3. Please see above for several additional findings. Electronically signed by: Jovan Cartagena MD Date:    08/21/2023 Time:    18:41    CT Thoracic Spine Without Contrast    Result Date: 8/21/2023  1. High attenuating lobular focus lies posterior to the T10 vertebral body, correlating with low signal focus on previous MRI.  This is suspected to reflect herniated nucleus polyposis in the setting of advanced degenerative disc disease as there is vacuum disc phenomenon at the T10-T11 disc space.  Although there is a punctate focus of air in the region, this is likely related to vacuum disc phenomenon extending from the joint space and left facet rather than infectious process.  No significant soft tissue edema in the region to support a diagnosis of infection.  Correlation however is advised, and correlation with patient's symptomatology and findings on prior MRI advised. 2. Evolving surgical changes of the lumbar spine as described noting resolution of prior postoperative fluid collection.  No definite fracture although evaluation for the same is significantly limited given the extent of motion artifact. 3. Please see above for several additional findings. Electronically signed by: Jovan Cartagena MD Date:    08/21/2023 Time:    18:41    MRI Cervical Spine Without Contrast    Result Date: 8/21/2023  1. Postoperative change of ACDF at C5-C7. 2. Multilevel degenerative changes detailed above.  Severe spinal canal stenosis noted at C3-C4, moderate at C2-C3, C4-C5, and C6-C7.  Moderate to severe neural foraminal narrowing noted at C3-C5 and C6-T1. Electronically signed  by: Santos Pacheco MD Date:    08/21/2023 Time:    15:49    XR Cervical Spine AP Lateral w/ Flex Ext and Swimmers Flex Ext Without Odontoid    Result Date: 8/21/2023  No fracture.  Additional findings above. Electronically signed by: Trell Church MD Date:    08/21/2023 Time:    15:41    MRI Thoracic Spine Without Contrast    Result Date: 8/21/2023  1. Disc extrusion likely containing vacuum phenomenon or calcification at T10-T11 resulting in severe spinal canal stenosis.  Associated spinal cord edema or myelomalacia noted. 2. Fluid signal within the T10-T11 disc space favored to be degenerative.  Absence of endplate erosion and probable associated vacuum phenomenon favor degenerative etiology.  Recommend further evaluation with CT as well as clinical assessment. 3. Several T2 hyperintense lesions within the liver, incompletely characterized.  Recommend abdominal ultrasound for further evaluation. Electronically signed by: Santos Pacheco MD Date:    08/21/2023 Time:    15:10    X-ray Lumbar Spine Flexion And Extension Only    Result Date: 8/21/2023  Allowing for the above limitations, flexion and extension lateral views of the lumbar spine demonstrate no subluxation. Electronically signed by: Kenny Rodrigez Date:    08/21/2023 Time:    14:29    X-Ray Hip 2 or 3 views Left (with Pelvis when performed)    Result Date: 8/21/2023  Allowing for the above limitations, no acute radiographic abnormality is demonstrated in the left total hip prosthesis or visualized portion of the osseous pelvic ring. Partially visualized right total hip prosthesis. Electronically signed by: Kenny Rodrigez Date:    08/21/2023 Time:    14:20

## 2023-08-26 NOTE — PLAN OF CARE
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    Past Surgical History:  : acdf      Comment:  2 level  : BACK SURGERY      Comment:  laminectomy  : BACK SURGERY      Comment:  lumbar fusion L1-S1  2016: CARDIAC CATHETERIZATION      Comment:  Normal Dr Wright; see media note; normal  No date:  SECTION      Comment:  x2  No date: EYE SURGERY; Bilateral      Comment:  laser   -   [employer]   2013: JOINT REPLACEMENT; Left  5/11/15: JOINT REPLACEMENT; Right  2023: SPINAL FUSION; N/A      Comment:  Procedure: FUSION, SPINE;  Surgeon: Pedrito Chand MD;                Location: [facility] OR [address] [place] [address];  Service: Neurosurgery;                 Laterality: N/A;  T8-pelvis extension and revision of                fusion, T10-11 discectomy; neuromonitoring, depuy  No date: TONSILLECTOMY    Individualization:   1.pain management     Restraints: N/A         Outcome: Ongoing, Progressing  Goal: Absence of Hospital-Acquired Illness or Injury  Outcome: Ongoing, Progressing  Goal: Optimal Comfort and Wellbeing  Outcome: Ongoing, Progressing  POC and meds reviewed with patient.Patient is AAOx4, up in chair with staff for hours.Family at bedside visiting, willcontinue to monitor.

## 2023-08-26 NOTE — NURSING TRANSFER
Nursing Transfer Note      8/26/2023   12:24 AM    Nurse giving handoff: Monie Anderson  Nurse receiving handoff:Salima      Reason patient is being transferred: stepdown      Transfer : 9084 to 908    Transfer via bed    Transfer with cardiac monitoring    Transported by RN    Transfer Vital Signs:  Blood Pressure:149/63  Heart Rate:77  O2:99  Temperature:98.3  Respirations:18    Telemetry: confirmed with telemetry monitor room    Order for Tele Monitor? Yes    Additional Lines: hemovac, woundvac    4eyes on Skin: yes    Medicines sent: none    Any special needs or follow-up needed: none    Patient belongings transferred with patient: Yes    Chart send with patient: Yes    Notified: son    Patient reassessed at: 8/26/2023,0000(date, time)    Upon arrival to floor: cardiac monitor applied, patient oriented to room, call bell in reach, and bed in lowest position, neuro assessment completed at bedside with oncoming nurse

## 2023-08-26 NOTE — PLAN OF CARE
Problem: Adult Inpatient Plan of Care  Goal: Plan of Care Review  Outcome: Ongoing, Progressing  Goal: Patient-Specific Goal (Individualized)  e: Ongoing, Progressing  Goal: Absence of Hospital-Acquired Illness or Injury  Outcome: Ongoing, Progressing  Goal: Optimal Comfort and Wellbeing  Outcome: Ongoing, Progressing  Goal: Readiness for Transition of Care  Outcome: Ongoing, Progressing     POC reviewed with the patient and they verbalized understanding. All comments and concerns addressed. Bed locked in lowest position with bed alarm set, call light within reach. Safety precautions maintained. VSS, see flowsheets. No events this shift. Will continue to monitor for changes to POC and clinical condition.     Pt c/o 10/10 sharp pain on right leg. Oxy given and gabapentin given.

## 2023-08-26 NOTE — NURSING
Nurses Note -- 4 Eyes      8/26/2023   7:31 AM      Skin assessed during: Transfer      [] No Altered Skin Integrity Present    []Prevention Measures Documented      [x] Yes- Altered Skin Integrity Present or Discovered   [] LDA Added if Not in Epic (Describe Wound)   [x] New Altered Skin Integrity was Present on Admit and Documented in LDA   [] Wound Image Taken     Back Surgical incision    Wound Care Consulted? Yes    Attending Nurse:  Salima Rodriguez RN/Staff Member:  Lawrence DOYLE RN

## 2023-08-27 LAB
ALBUMIN SERPL BCP-MCNC: 2.8 G/DL (ref 3.5–5.2)
ALP SERPL-CCNC: 42 U/L (ref 55–135)
ALT SERPL W/O P-5'-P-CCNC: 20 U/L (ref 10–44)
ANION GAP SERPL CALC-SCNC: 6 MMOL/L (ref 8–16)
AST SERPL-CCNC: 24 U/L (ref 10–40)
BASOPHILS # BLD AUTO: 0.03 K/UL (ref 0–0.2)
BASOPHILS NFR BLD: 0.5 % (ref 0–1.9)
BILIRUB SERPL-MCNC: 0.6 MG/DL (ref 0.1–1)
BUN SERPL-MCNC: 12 MG/DL (ref 8–23)
CALCIUM SERPL-MCNC: 9 MG/DL (ref 8.7–10.5)
CHLORIDE SERPL-SCNC: 104 MMOL/L (ref 95–110)
CO2 SERPL-SCNC: 26 MMOL/L (ref 23–29)
CREAT SERPL-MCNC: 0.6 MG/DL (ref 0.5–1.4)
DIFFERENTIAL METHOD: ABNORMAL
EOSINOPHIL # BLD AUTO: 0.1 K/UL (ref 0–0.5)
EOSINOPHIL NFR BLD: 1.3 % (ref 0–8)
ERYTHROCYTE [DISTWIDTH] IN BLOOD BY AUTOMATED COUNT: 13.2 % (ref 11.5–14.5)
EST. GFR  (NO RACE VARIABLE): >60 ML/MIN/1.73 M^2
GLUCOSE SERPL-MCNC: 105 MG/DL (ref 70–110)
HCT VFR BLD AUTO: 32 % (ref 37–48.5)
HGB BLD-MCNC: 10.7 G/DL (ref 12–16)
IMM GRANULOCYTES # BLD AUTO: 0.04 K/UL (ref 0–0.04)
IMM GRANULOCYTES NFR BLD AUTO: 0.7 % (ref 0–0.5)
LYMPHOCYTES # BLD AUTO: 1.2 K/UL (ref 1–4.8)
LYMPHOCYTES NFR BLD: 19.1 % (ref 18–48)
MAGNESIUM SERPL-MCNC: 1.9 MG/DL (ref 1.6–2.6)
MCH RBC QN AUTO: 29.8 PG (ref 27–31)
MCHC RBC AUTO-ENTMCNC: 33.4 G/DL (ref 32–36)
MCV RBC AUTO: 89 FL (ref 82–98)
MONOCYTES # BLD AUTO: 0.7 K/UL (ref 0.3–1)
MONOCYTES NFR BLD: 11.1 % (ref 4–15)
NEUTROPHILS # BLD AUTO: 4.1 K/UL (ref 1.8–7.7)
NEUTROPHILS NFR BLD: 67.3 % (ref 38–73)
NRBC BLD-RTO: 0 /100 WBC
PHOSPHATE SERPL-MCNC: 3.5 MG/DL (ref 2.7–4.5)
PLATELET # BLD AUTO: 183 K/UL (ref 150–450)
PMV BLD AUTO: 9.9 FL (ref 9.2–12.9)
POTASSIUM SERPL-SCNC: 5 MMOL/L (ref 3.5–5.1)
PROT SERPL-MCNC: 5.6 G/DL (ref 6–8.4)
RBC # BLD AUTO: 3.59 M/UL (ref 4–5.4)
SODIUM SERPL-SCNC: 136 MMOL/L (ref 136–145)
WBC # BLD AUTO: 6.14 K/UL (ref 3.9–12.7)

## 2023-08-27 PROCEDURE — 94761 N-INVAS EAR/PLS OXIMETRY MLT: CPT

## 2023-08-27 PROCEDURE — 84100 ASSAY OF PHOSPHORUS: CPT

## 2023-08-27 PROCEDURE — 97116 GAIT TRAINING THERAPY: CPT | Mod: CQ

## 2023-08-27 PROCEDURE — 97530 THERAPEUTIC ACTIVITIES: CPT | Mod: CQ

## 2023-08-27 PROCEDURE — 25000003 PHARM REV CODE 250: Performed by: NEUROLOGICAL SURGERY

## 2023-08-27 PROCEDURE — 11000001 HC ACUTE MED/SURG PRIVATE ROOM

## 2023-08-27 PROCEDURE — 80053 COMPREHEN METABOLIC PANEL: CPT

## 2023-08-27 PROCEDURE — 83735 ASSAY OF MAGNESIUM: CPT

## 2023-08-27 PROCEDURE — 25000003 PHARM REV CODE 250: Performed by: NURSE PRACTITIONER

## 2023-08-27 PROCEDURE — 25000003 PHARM REV CODE 250: Performed by: PHYSICIAN ASSISTANT

## 2023-08-27 PROCEDURE — 85025 COMPLETE CBC W/AUTO DIFF WBC: CPT | Performed by: NURSE PRACTITIONER

## 2023-08-27 PROCEDURE — 63600175 PHARM REV CODE 636 W HCPCS: Performed by: STUDENT IN AN ORGANIZED HEALTH CARE EDUCATION/TRAINING PROGRAM

## 2023-08-27 PROCEDURE — 25000003 PHARM REV CODE 250: Performed by: STUDENT IN AN ORGANIZED HEALTH CARE EDUCATION/TRAINING PROGRAM

## 2023-08-27 PROCEDURE — 36415 COLL VENOUS BLD VENIPUNCTURE: CPT

## 2023-08-27 RX ADMIN — SENNOSIDES AND DOCUSATE SODIUM 1 TABLET: 50; 8.6 TABLET ORAL at 08:08

## 2023-08-27 RX ADMIN — GABAPENTIN 300 MG: 300 CAPSULE ORAL at 08:08

## 2023-08-27 RX ADMIN — METHOCARBAMOL 750 MG: 750 TABLET ORAL at 01:08

## 2023-08-27 RX ADMIN — MUPIROCIN: 20 OINTMENT TOPICAL at 08:08

## 2023-08-27 RX ADMIN — Medication 6 MG: at 08:08

## 2023-08-27 RX ADMIN — METHOCARBAMOL 750 MG: 750 TABLET ORAL at 08:08

## 2023-08-27 RX ADMIN — HEPARIN SODIUM 5000 UNITS: 5000 INJECTION INTRAVENOUS; SUBCUTANEOUS at 05:08

## 2023-08-27 RX ADMIN — GABAPENTIN 300 MG: 300 CAPSULE ORAL at 04:08

## 2023-08-27 RX ADMIN — OXYCODONE HYDROCHLORIDE 10 MG: 10 TABLET ORAL at 02:08

## 2023-08-27 RX ADMIN — HEPARIN SODIUM 5000 UNITS: 5000 INJECTION INTRAVENOUS; SUBCUTANEOUS at 09:08

## 2023-08-27 RX ADMIN — OXYCODONE HYDROCHLORIDE 10 MG: 10 TABLET ORAL at 01:08

## 2023-08-27 RX ADMIN — SILODOSIN 4 MG: 4 CAPSULE ORAL at 08:08

## 2023-08-27 RX ADMIN — OXYCODONE HYDROCHLORIDE 10 MG: 10 TABLET ORAL at 10:08

## 2023-08-27 RX ADMIN — HEPARIN SODIUM 5000 UNITS: 5000 INJECTION INTRAVENOUS; SUBCUTANEOUS at 01:08

## 2023-08-27 RX ADMIN — ACETAMINOPHEN 650 MG: 325 TABLET ORAL at 08:08

## 2023-08-27 RX ADMIN — POLYETHYLENE GLYCOL 3350 17 G: 17 POWDER, FOR SOLUTION ORAL at 08:08

## 2023-08-27 NOTE — PLAN OF CARE
Problem: Infection  Goal: Absence of Infection Signs and Symptoms  Outcome: Ongoing, Progressing     Problem: Adult Inpatient Plan of Care  Goal: Plan of Care Review  Outcome: Ongoing, Progressing  Goal: Patient-Specific Goal (Individualized)    Outcome: Ongoing, Progressing  Goal: Absence of Hospital-Acquired Illness or Injury  Outcome: Ongoing, Progressing  Goal: Optimal Comfort and Wellbeing  Outcome: Ongoing, Progressing  Goal: Readiness for Transition of Care  Outcome: Ongoing, Progressing     POC reviewed and updated with the patient at the bedside. Questions regarding POC were encouraged and addressed. VSS, see flowsheets. Tele maintained. Patient is AOx 4 at this time. Fall and safety precautions maintained, no signs of injury noted during shift. 1 hemovacs to gravity and woundvac in place, see flowsheets for output. Pain management utilizing PRN pain medications effective, see MAR for administration details. LSO brace to be worn OOB.  Patient was repositioned for comfort with bed locked in low position, side rails up x 3, bed alarm set, with call light within reach. Instructed patient to call staff for mobility, verbalized understanding. No acute signs of distress noted at this time.

## 2023-08-27 NOTE — PLAN OF CARE
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    Past Surgical History:  : acdf      Comment:  2 level  : BACK SURGERY      Comment:  laminectomy  : BACK SURGERY      Comment:  lumbar fusion L1-S1  2016: CARDIAC CATHETERIZATION      Comment:  Normal Dr Wright; see media note; normal  No date:  SECTION      Comment:  x2  No date: EYE SURGERY; Bilateral      Comment:  laser   -   Nano's   2013: JOINT REPLACEMENT; Left  5/11/15: JOINT REPLACEMENT; Right  2023: SPINAL FUSION; N/A      Comment:  Procedure: FUSION, SPINE;  Surgeon: Pedrito Chand MD;                Location: [facility] OR [address] [place] [address];  Service: Neurosurgery;                 Laterality: N/A;  T8-pelvis extension and revision of                fusion, T10-11 discectomy; neuromonitoring, depuy  No date: TONSILLECTOMY    Individualization:   1.pain management     Restraints: N/A         Outcome: Ongoing, Progressing  Goal: Absence of Hospital-Acquired Illness or Injury  Outcome: Ongoing, Progressing  Goal: Optimal Comfort and Wellbeing  Outcome: Ongoing, Progressing  Goal: Readiness for Transition of Care  Outcome: Ongoing, Progressing     Problem: Fall Injury Risk  Goal: Absence of Fall and Fall-Related Injury  Outcome: Ongoing, Progressing   POC and meds reviewed with patient.Patient is AAOx4, up in chair with PT.Pain med given upon request.All other needs addressed.Will continue to monitor.

## 2023-08-27 NOTE — ASSESSMENT & PLAN NOTE
Emilia Pablo is a 78 y.o. female with PMH of osteoporosis- on Prolia, HTN, Abreu-Stickler Syndrome, worsening BLE weakness x 3 months who presents for evaluation of acute myelopathy.     - MRI cervical and thoracic spine shows moderate canal stenosis C4-5, T10-11 2/2 calcified disc herniation with cord signal change, multilevel foraminal stenosis.   - MRI lumbar spine with stable lumbar collection in subcutaneous tissues. No significant central stenosis. Prior MRI L spine 8/2/2023 limited evaluation 2/2 hardware artifact, but without severe canal stenosis.      S/p T10-11 discectomy and extension/revision of prior fusion to T8-pelvis.     - admitted to neurosurgery floor status, q4 neuro checks  - continue HV, Abx while in place  - post op xrays reviewed, hardware in good position  - tolerating diet  - meza for retention  - ctm hemoglobin (10.7 today, increased from 10.3)  - SQH for DVT ppx  - PT/OT/TLSO when up/OOB    Dispo: ongoing, pending PT recs

## 2023-08-27 NOTE — PT/OT/SLP PROGRESS
Physical Therapy Treatment    Patient Name:  Emilia Pablo   MRN:  [id number]    Recommendations:     Discharge Recommendations: rehabilitation facility  Discharge Equipment Recommendations: to be determined by next level of care  Barriers to discharge: Decreased caregiver support Pt requiring increased skilled assistance at current time.     Assessment:     Emilia Pablo is a 78 y.o. female admitted with a medical diagnosis of Thoracic spinal stenosis.  She presents with the following impairments/functional limitations: weakness, impaired endurance, impaired sensation, impaired self care skills, impaired functional mobility, gait instability, impaired balance, decreased coordination, decreased upper extremity function, decreased lower extremity function, decreased safety awareness, pain, impaired coordination, impaired fine motor, impaired skin, edema, orthopedic precautions requiring significant assistance and verbal cues for bed mob, sit < > stand transitions, OOB/BTB transfers, static standing, and gait to prevent falls due to weakness, decreased proprioception, post lean, pain, decreased B LE's control.   In light of pt's current functional level and deficits, it is anticipated that pt will need to participate in an intense rehab program consisting of PT and OT in order to achieve full rehab potential to return to previous level of function and roles.  Pt remains motivated to participate in PT session and will cont to benefit from skilled PT intervention..    Rehab Prognosis: Good and Fair; patient would benefit from acute skilled PT services to address these deficits and reach maximum level of function.    Recent Surgery: Procedure(s) (LRB):  FUSION, SPINE (N/A) 5 Days Post-Op    Plan:     During this hospitalization, patient to be seen 4 x/week to address the identified rehab impairments via gait training, therapeutic activities, therapeutic exercises, neuromuscular re-education and progress toward the  following goals:    Plan of Care Expires:  09/23/23    Subjective     Chief Complaint: pain, fatigue, decreased proprioception  Pain/Comfort:  Pain Rating 1:  (no rating provided)  Location - Orientation 1: generalized  Location 1: back  Pain Addressed 1: Reposition, Distraction, Cessation of Activity  Pain Rating Post-Intervention 1:  (no rating provided)      Objective:     Communicated with nurse (Jane) prior to session.  Patient found  using BSC  with hemovac, telemetry, TLSO, wound vac upon PT entry to room.     General Precautions: Standard, fall  Orthopedic Precautions: spinal precautions  Braces: TLSO  Respiratory Status: Room air     Functional Mobility:  Transfers:     Sit to Stand:  from BSC/EOB with max A of 2 for hip elevation and balance with RW x2 trials  BSC > EOB/Bed to Chair: max A of 1 with  B HHA  using  Step Transfer  Gait: RW max A of 2 6-8 minimal, shuffling steps laterally to the L.  Pt demonstrates flexed hips, post COG, decreased B knee control (locked into ext during stance phase), ataxia, inconsistent step length, increased reliance of AD through UE's  Balance: static sitting at the EOB with SBA; static standing with RW max/mod A of 1 for balance, hip ext  PTA re-adjusted brace while seated at the EOB      AM-PAC 6 CLICK MOBILITY  Turning over in bed (including adjusting bedclothes, sheets and blankets)?: 2  Sitting down on and standing up from a chair with arms (e.g., wheelchair, bedside commode, etc.): 2  Moving from lying on back to sitting on the side of the bed?: 2  Moving to and from a bed to a chair (including a wheelchair)?: 2  Need to walk in hospital room?: 1  Climbing 3-5 steps with a railing?: 1  Basic Mobility Total Score: 10       Treatment & Education:  Patient provided with daily orientation and goals of this PT session. They were educated to call for assistance and to transfer with hospital staff only.  Also, pt was educated on the effects of prolonged immobility and  the importance of performing OOB activity and exercises to promote healing and reduce recovery time    Patient left up in chair with all lines intact, call button in reach, and nurse notified..    GOALS:   Multidisciplinary Problems       Physical Therapy Goals          Problem: Physical Therapy    Goal Priority Disciplines Outcome Goal Variances Interventions   Physical Therapy Goal     PT, PT/OT Ongoing, Progressing     Description: Goals to be met by: 23     Patient will increase functional independence with mobility by performin. Supine to sit with Stand-by Assistance  2. Sit to supine with Stand-by Assistance  3. Sit to stand transfer with Contact Guard Assistance  4. Bed to chair transfer with Contact Guard Assistance using LRAD as needed  5. Gait  x 50 feet with min(A) using LRAD as needed.   6. Lower extremity exercise program x15 reps per handout, with assistance as needed                         Time Tracking:     PT Received On: 23  PT Start Time: 1156     PT Stop Time: 1228  PT Total Time (min): 32 min     Billable Minutes: Gait Training 10 and Therapeutic Activity 22    Treatment Type: Treatment  PT/PTA: PTA     Number of PTA visits since last PT visit: 2023

## 2023-08-27 NOTE — PROGRESS NOTES
[facility] Neurosurgery ([facility])  Neurosurgery  Progress Note    Subjective:     History of Present Illness: Emilia Pablo is a 78 y.o. female with PMH of osteoporosis- on Prolia, HTN, and Abreu-Stickler Syndrome. She has a surgical hx of 2001 cervical fusion and 2008 lumbosacral fusion - EJ - did well from surgery, chronic foot drop on left since surgery. She was evaluated by Dr. Aminta FARRIS one month ago for weakness, gait imbalance and LLE weakness and the patient was referred to Dr. Chand for further evaluation. She was not able to complete the outpatient workup due to new neuro deficits. Reports a fall trying to transfer from the chair to the toilet. She denies hitting her head/back or LOC. Reports since then she noticed worsening weakness RLE>LLE and some bowel incontinence. She feels the urge to have a BM but reports episodes of incontinence. Also notes urinary urgency x 2 weeks. Prakash placed in ED due to retention, bladder scan 660cc without the urge to void. She has nondermatomal patchy numbness in the BLE in the feet and thighs. Denies saddle anesthesia, UE weakness or numbness. NSGY consulted for evaluation.       Post-Op Info:  Procedure(s) (LRB):  FUSION, SPINE (N/A)   5 Days Post-Op     Interval History: 8/27: POD 5. NAEON. Drain put out 60cc serosanguinous fluid overnight. Hb increased to 10.7. PT did not see yesterday, should see today.        Medications:  Continuous Infusions:      Scheduled Meds:   gabapentin  300 mg Oral TID    heparin (porcine)  5,000 Units Subcutaneous Q8H    mupirocin   Nasal BID    polyethylene glycol  17 g Oral Daily    senna-docusate 8.6-50 mg  1 tablet Oral BID    silodosin  4 mg Oral Daily     PRN Meds:acetaminophen, bisacodyL, calcium gluconate IVPB, calcium gluconate IVPB, dextrose 10%, dextrose 10%, famotidine, glucagon (human recombinant), glucose, glucose, glucose, hydrALAZINE, labetalol, magnesium oxide, magnesium oxide, melatonin, methocarbamoL,  "ondansetron, oxyCODONE, oxyCODONE, potassium bicarbonate, potassium bicarbonate, potassium bicarbonate, potassium, sodium phosphates, potassium, sodium phosphates, potassium, sodium phosphates     Review of Systems  Objective:     Weight: 68.5 kg (151 lb 0.2 oz)  Body mass index is 25.92 kg/m².  Vital Signs (Most Recent):  Temp: 97.8 °F (36.6 °C) (08/27/23 1124)  Pulse: 87 (08/27/23 1124)  Resp: 18 (08/27/23 1124)  BP: (!) 104/53 (08/27/23 1124)  SpO2: 97 % (08/27/23 1124) Vital Signs (24h Range):  Temp:  [97.8 °F (36.6 °C)-98.5 °F (36.9 °C)] 97.8 °F (36.6 °C)  Pulse:  [] 87  Resp:  [14-20] 18  SpO2:  [94 %-98 %] 97 %  BP: (104-139)/(53-60) 104/53                                Urethral Catheter 08/21/23 1530 Non-latex 16 Fr. (Active)   Site Assessment Clean;Intact 08/22/23 0200   Collection Container Urimeter 08/22/23 0200   Securement Method secured to top of thigh w/ adhesive device 08/22/23 0200   Catheter Care Performed yes 08/22/23 0600   Reason for Continuing Urinary Catheterization Urinary retention 08/22/23 0200   CAUTI Prevention Bundle Securement Device in place with 1" slack;Intact seal between catheter & drainage tubing;Drainage bag/urimeter off the floor;Sheeting clip in use;No dependent loops or kinks;Drainage bag/urimeter not overfilled (<2/3 full);Drainage bag/urimeter below bladder 08/21/23 2355   Output (mL) 1000 mL 08/22/23 0538         Physical Exam         Neurosurgery Physical Exam  General: well developed, well nourished, no distress.   Head: normocephalic, atraumatic  Neurologic: Alert and oriented. Thought content appropriate.  GCS: Motor: 6/Verbal: 5/Eyes: 4 GCS Total: 15  Mental Status: Awake, Alert, Oriented x 4  Language: No aphasia  Speech: No dysarthria  Cranial nerves: face symmetric, tongue midline, CN II-XII grossly intact, EOMI.   Pulmonary: normal respirations, no signs of respiratory distress  Abdomen: soft, non-distended, not tender to palpation  Sensory: intact to light " "touch throughout  Motor Strength:   BUE 5/5  LLE 4/5 HF/KF/KE/EHL, 3/5 DF, 4/5 PF.   RLE 5/5 throughout with some pain limited hip flexion weakness  no anai/clon  Vascular: No LE edema.   Skin: Skin is warm, dry and intact.    Significant Labs:  Recent Labs   Lab 08/27/23 0407         K 5.0      CO2 26   BUN 12   CREATININE 0.6   CALCIUM 9.0   MG 1.9       Recent Labs   Lab 08/27/23  0407   WBC 6.14   HGB 10.7*   HCT 32.0*          No results for input(s): "LABPT", "INR", "APTT" in the last 48 hours.    Microbiology Results (last 7 days)       ** No results found for the last 168 hours. **          All pertinent labs from the last 24 hours have been reviewed.    Significant Diagnostics:  MRI Lumbar Spine Without Contrast    Result Date: 8/22/2023  Postoperative changes L1 through S1 posterior instrumented fusion.  There is susceptibility artifact which limits evaluation. Multilevel degenerative changes as above, noting susceptibility artifact limits evaluation of the neural foramina at multiple levels.  Findings most significantly contribute to severe left neural foraminal narrowing at L5-S1.  No high-grade canal stenosis. Stable fluid collection in the subcutaneous tissues at the operative bed. Additional findings discussed above and identified on same day thoracic spine MRI. Electronically signed by resident: Juan Wright Date:    08/22/2023 Time:    00:09 Electronically signed by: Ayush Back MD Date:    08/22/2023 Time:    01:15    US Abdomen Complete    Result Date: 8/22/2023  Hepatic cyst in the left lobe which correlates with MRI T-spine from same date.  No suspicious focal liver lesions. Cholelithiasis.  Mild gallbladder wall thickening without other convincing signs of acute cholecystitis. Left kidney simple cyst. Electronically signed by resident: Juan Wright Date:    08/21/2023 Time:    23:40 Electronically signed by: Ayush Back MD Date:    08/22/2023 " Time:    00:20    US Lower Extremity Veins Bilateral    Result Date: 8/21/2023  1. No evidence of DVT bilaterally. 2. Baker cyst in the right popliteal fossa. Electronically signed by: Amrando Camarillo Date:    08/21/2023 Time:    23:11    X-Ray Chest AP Portable    Result Date: 8/21/2023  1. No convincing acute cardiopulmonary process noting coarse interstitial attenuation suggests underlying COPD/emphysema.  Correlation is advised. Electronically signed by: Jovan Cartagena MD Date:    08/21/2023 Time:    19:28    CT Lumbar Spine Without Contrast    Result Date: 8/21/2023  1. High attenuating lobular focus lies posterior to the T10 vertebral body, correlating with low signal focus on previous MRI.  This is suspected to reflect herniated nucleus polyposis in the setting of advanced degenerative disc disease as there is vacuum disc phenomenon at the T10-T11 disc space.  Although there is a punctate focus of air in the region, this is likely related to vacuum disc phenomenon extending from the joint space and left facet rather than infectious process.  No significant soft tissue edema in the region to support a diagnosis of infection.  Correlation however is advised, and correlation with patient's symptomatology and findings on prior MRI advised. 2. Evolving surgical changes of the lumbar spine as described noting resolution of prior postoperative fluid collection.  No definite fracture although evaluation for the same is significantly limited given the extent of motion artifact. 3. Please see above for several additional findings. Electronically signed by: Jovan Cartagena MD Date:    08/21/2023 Time:    18:41    CT Thoracic Spine Without Contrast    Result Date: 8/21/2023  1. High attenuating lobular focus lies posterior to the T10 vertebral body, correlating with low signal focus on previous MRI.  This is suspected to reflect herniated nucleus polyposis in the setting of advanced degenerative disc disease as there is  vacuum disc phenomenon at the T10-T11 disc space.  Although there is a punctate focus of air in the region, this is likely related to vacuum disc phenomenon extending from the joint space and left facet rather than infectious process.  No significant soft tissue edema in the region to support a diagnosis of infection.  Correlation however is advised, and correlation with patient's symptomatology and findings on prior MRI advised. 2. Evolving surgical changes of the lumbar spine as described noting resolution of prior postoperative fluid collection.  No definite fracture although evaluation for the same is significantly limited given the extent of motion artifact. 3. Please see above for several additional findings. Electronically signed by: Jovan Cartagena MD Date:    08/21/2023 Time:    18:41    MRI Cervical Spine Without Contrast    Result Date: 8/21/2023  1. Postoperative change of ACDF at C5-C7. 2. Multilevel degenerative changes detailed above.  Severe spinal canal stenosis noted at C3-C4, moderate at C2-C3, C4-C5, and C6-C7.  Moderate to severe neural foraminal narrowing noted at C3-C5 and C6-T1. Electronically signed by: Santos Pacheco MD Date:    08/21/2023 Time:    15:49    XR Cervical Spine AP Lateral w/ Flex Ext and Swimmers Flex Ext Without Odontoid    Result Date: 8/21/2023  No fracture.  Additional findings above. Electronically signed by: Trell Church MD Date:    08/21/2023 Time:    15:41    MRI Thoracic Spine Without Contrast    Result Date: 8/21/2023  1. Disc extrusion likely containing vacuum phenomenon or calcification at T10-T11 resulting in severe spinal canal stenosis.  Associated spinal cord edema or myelomalacia noted. 2. Fluid signal within the T10-T11 disc space favored to be degenerative.  Absence of endplate erosion and probable associated vacuum phenomenon favor degenerative etiology.  Recommend further evaluation with CT as well as clinical assessment. 3. Several T2 hyperintense  lesions within the liver, incompletely characterized.  Recommend abdominal ultrasound for further evaluation. Electronically signed by: Santos Pacheco MD Date:    08/21/2023 Time:    15:10    X-ray Lumbar Spine Flexion And Extension Only    Result Date: 8/21/2023  Allowing for the above limitations, flexion and extension lateral views of the lumbar spine demonstrate no subluxation. Electronically signed by: Kenny Rodrigez Date:    08/21/2023 Time:    14:29    X-Ray Hip 2 or 3 views Left (with Pelvis when performed)    Result Date: 8/21/2023  Allowing for the above limitations, no acute radiographic abnormality is demonstrated in the left total hip prosthesis or visualized portion of the osseous pelvic ring. Partially visualized right total hip prosthesis. Electronically signed by: Kenny Rodrigez Date:    08/21/2023 Time:    14:20       Assessment/Plan:     * Thoracic spinal stenosis  Emilia Pablo is a 78 y.o. female with PMH of osteoporosis- on Prolia, HTN, Abreu-Stickler Syndrome, worsening BLE weakness x 3 months who presents for evaluation of acute myelopathy.     - MRI cervical and thoracic spine shows moderate canal stenosis C4-5, T10-11 2/2 calcified disc herniation with cord signal change, multilevel foraminal stenosis.   - MRI lumbar spine with stable lumbar collection in subcutaneous tissues. No significant central stenosis. Prior MRI L spine 8/2/2023 limited evaluation 2/2 hardware artifact, but without severe canal stenosis.      S/p T10-11 discectomy and extension/revision of prior fusion to T8-pelvis.     - admitted to neurosurgery floor status, q4 neuro checks  - continue HV, Abx while in place  - post op xrays reviewed, hardware in good position  - tolerating diet  - meza for retention  - ctm hemoglobin (10.7 today, increased from 10.3)  - SQH for DVT ppx  - PT/OT/TLSO when up/OOB    Dispo: ongoing, pending PT recs          Abran Kapoor MD  Neurosurgery  [facility] - Neurosurgery ([facility])

## 2023-08-27 NOTE — SUBJECTIVE & OBJECTIVE
"Interval History: 8/27: POD 5. NAEON. Drain put out 60cc serosanguinous fluid overnight. Hb increased to 10.7. PT did not see yesterday, should see today.        Medications:  Continuous Infusions:      Scheduled Meds:   gabapentin  300 mg Oral TID    heparin (porcine)  5,000 Units Subcutaneous Q8H    mupirocin   Nasal BID    polyethylene glycol  17 g Oral Daily    senna-docusate 8.6-50 mg  1 tablet Oral BID    silodosin  4 mg Oral Daily     PRN Meds:acetaminophen, bisacodyL, calcium gluconate IVPB, calcium gluconate IVPB, dextrose 10%, dextrose 10%, famotidine, glucagon (human recombinant), glucose, glucose, glucose, hydrALAZINE, labetalol, magnesium oxide, magnesium oxide, melatonin, methocarbamoL, ondansetron, oxyCODONE, oxyCODONE, potassium bicarbonate, potassium bicarbonate, potassium bicarbonate, potassium, sodium phosphates, potassium, sodium phosphates, potassium, sodium phosphates     Review of Systems  Objective:     Weight: 68.5 kg (151 lb 0.2 oz)  Body mass index is 25.92 kg/m².  Vital Signs (Most Recent):  Temp: 97.8 °F (36.6 °C) (08/27/23 1124)  Pulse: 87 (08/27/23 1124)  Resp: 18 (08/27/23 1124)  BP: (!) 104/53 (08/27/23 1124)  SpO2: 97 % (08/27/23 1124) Vital Signs (24h Range):  Temp:  [97.8 °F (36.6 °C)-98.5 °F (36.9 °C)] 97.8 °F (36.6 °C)  Pulse:  [] 87  Resp:  [14-20] 18  SpO2:  [94 %-98 %] 97 %  BP: (104-139)/(53-60) 104/53                                Urethral Catheter 08/21/23 1530 Non-latex 16 Fr. (Active)   Site Assessment Clean;Intact 08/22/23 0200   Collection Container Urimeter 08/22/23 0200   Securement Method secured to top of thigh w/ adhesive device 08/22/23 0200   Catheter Care Performed yes 08/22/23 0600   Reason for Continuing Urinary Catheterization Urinary retention 08/22/23 0200   CAUTI Prevention Bundle Securement Device in place with 1" slack;Intact seal between catheter & drainage tubing;Drainage bag/urimeter off the floor;Sheeting clip in use;No dependent loops or " "kinks;Drainage bag/urimeter not overfilled (<2/3 full);Drainage bag/urimeter below bladder 08/21/23 2355   Output (mL) 1000 mL 08/22/23 0538          Physical Exam         Neurosurgery Physical Exam  General: well developed, well nourished, no distress.   Head: normocephalic, atraumatic  Neurologic: Alert and oriented. Thought content appropriate.  GCS: Motor: 6/Verbal: 5/Eyes: 4 GCS Total: 15  Mental Status: Awake, Alert, Oriented x 4  Language: No aphasia  Speech: No dysarthria  Cranial nerves: face symmetric, tongue midline, CN II-XII grossly intact, EOMI.   Pulmonary: normal respirations, no signs of respiratory distress  Abdomen: soft, non-distended, not tender to palpation  Sensory: intact to light touch throughout  Motor Strength:   BUE 5/5  LLE 4/5 HF/KF/KE/EHL, 3/5 DF, 4/5 PF.   RLE 5/5 throughout with some pain limited hip flexion weakness  no anai/clon  Vascular: No LE edema.   Skin: Skin is warm, dry and intact.    Significant Labs:  Recent Labs   Lab 08/27/23  0407         K 5.0      CO2 26   BUN 12   CREATININE 0.6   CALCIUM 9.0   MG 1.9       Recent Labs   Lab 08/27/23  0407   WBC 6.14   HGB 10.7*   HCT 32.0*          No results for input(s): "LABPT", "INR", "APTT" in the last 48 hours.    Microbiology Results (last 7 days)       ** No results found for the last 168 hours. **          All pertinent labs from the last 24 hours have been reviewed.    Significant Diagnostics:  MRI Lumbar Spine Without Contrast    Result Date: 8/22/2023  Postoperative changes L1 through S1 posterior instrumented fusion.  There is susceptibility artifact which limits evaluation. Multilevel degenerative changes as above, noting susceptibility artifact limits evaluation of the neural foramina at multiple levels.  Findings most significantly contribute to severe left neural foraminal narrowing at L5-S1.  No high-grade canal stenosis. Stable fluid collection in the subcutaneous tissues at the " operative bed. Additional findings discussed above and identified on same day thoracic spine MRI. Electronically signed by resident: Juan Wright Date:    08/22/2023 Time:    00:09 Electronically signed by: Ayush Back MD Date:    08/22/2023 Time:    01:15    US Abdomen Complete    Result Date: 8/22/2023  Hepatic cyst in the left lobe which correlates with MRI T-spine from same date.  No suspicious focal liver lesions. Cholelithiasis.  Mild gallbladder wall thickening without other convincing signs of acute cholecystitis. Left kidney simple cyst. Electronically signed by resident: Juan Wright Date:    08/21/2023 Time:    23:40 Electronically signed by: Ayush Back MD Date:    08/22/2023 Time:    00:20    US Lower Extremity Veins Bilateral    Result Date: 8/21/2023  1. No evidence of DVT bilaterally. 2. Baker cyst in the right popliteal fossa. Electronically signed by: Armando Camarillo Date:    08/21/2023 Time:    23:11    X-Ray Chest AP Portable    Result Date: 8/21/2023  1. No convincing acute cardiopulmonary process noting coarse interstitial attenuation suggests underlying COPD/emphysema.  Correlation is advised. Electronically signed by: Jovan Cartagena MD Date:    08/21/2023 Time:    19:28    CT Lumbar Spine Without Contrast    Result Date: 8/21/2023  1. High attenuating lobular focus lies posterior to the T10 vertebral body, correlating with low signal focus on previous MRI.  This is suspected to reflect herniated nucleus polyposis in the setting of advanced degenerative disc disease as there is vacuum disc phenomenon at the T10-T11 disc space.  Although there is a punctate focus of air in the region, this is likely related to vacuum disc phenomenon extending from the joint space and left facet rather than infectious process.  No significant soft tissue edema in the region to support a diagnosis of infection.  Correlation however is advised, and correlation with patient's symptomatology and findings on  prior MRI advised. 2. Evolving surgical changes of the lumbar spine as described noting resolution of prior postoperative fluid collection.  No definite fracture although evaluation for the same is significantly limited given the extent of motion artifact. 3. Please see above for several additional findings. Electronically signed by: Jovan Cartagena MD Date:    08/21/2023 Time:    18:41    CT Thoracic Spine Without Contrast    Result Date: 8/21/2023  1. High attenuating lobular focus lies posterior to the T10 vertebral body, correlating with low signal focus on previous MRI.  This is suspected to reflect herniated nucleus polyposis in the setting of advanced degenerative disc disease as there is vacuum disc phenomenon at the T10-T11 disc space.  Although there is a punctate focus of air in the region, this is likely related to vacuum disc phenomenon extending from the joint space and left facet rather than infectious process.  No significant soft tissue edema in the region to support a diagnosis of infection.  Correlation however is advised, and correlation with patient's symptomatology and findings on prior MRI advised. 2. Evolving surgical changes of the lumbar spine as described noting resolution of prior postoperative fluid collection.  No definite fracture although evaluation for the same is significantly limited given the extent of motion artifact. 3. Please see above for several additional findings. Electronically signed by: Jovan Cartagena MD Date:    08/21/2023 Time:    18:41    MRI Cervical Spine Without Contrast    Result Date: 8/21/2023  1. Postoperative change of ACDF at C5-C7. 2. Multilevel degenerative changes detailed above.  Severe spinal canal stenosis noted at C3-C4, moderate at C2-C3, C4-C5, and C6-C7.  Moderate to severe neural foraminal narrowing noted at C3-C5 and C6-T1. Electronically signed by: Santos Pacheco MD Date:    08/21/2023 Time:    15:49    XR Cervical Spine AP Lateral w/ Flex Ext  and Swimmers Flex Ext Without Odontoid    Result Date: 8/21/2023  No fracture.  Additional findings above. Electronically signed by: Trell Church MD Date:    08/21/2023 Time:    15:41    MRI Thoracic Spine Without Contrast    Result Date: 8/21/2023  1. Disc extrusion likely containing vacuum phenomenon or calcification at T10-T11 resulting in severe spinal canal stenosis.  Associated spinal cord edema or myelomalacia noted. 2. Fluid signal within the T10-T11 disc space favored to be degenerative.  Absence of endplate erosion and probable associated vacuum phenomenon favor degenerative etiology.  Recommend further evaluation with CT as well as clinical assessment. 3. Several T2 hyperintense lesions within the liver, incompletely characterized.  Recommend abdominal ultrasound for further evaluation. Electronically signed by: Santos Pacheco MD Date:    08/21/2023 Time:    15:10    X-ray Lumbar Spine Flexion And Extension Only    Result Date: 8/21/2023  Allowing for the above limitations, flexion and extension lateral views of the lumbar spine demonstrate no subluxation. Electronically signed by: Kenny Rodrigez Date:    08/21/2023 Time:    14:29    X-Ray Hip 2 or 3 views Left (with Pelvis when performed)    Result Date: 8/21/2023  Allowing for the above limitations, no acute radiographic abnormality is demonstrated in the left total hip prosthesis or visualized portion of the osseous pelvic ring. Partially visualized right total hip prosthesis. Electronically signed by: Kenny Rodrigez Date:    08/21/2023 Time:    14:20

## 2023-08-28 VITALS
SYSTOLIC BLOOD PRESSURE: 110 MMHG | WEIGHT: 151 LBS | HEART RATE: 86 BPM | OXYGEN SATURATION: 99 % | BODY MASS INDEX: 25.78 KG/M2 | RESPIRATION RATE: 18 BRPM | HEIGHT: 64 IN | DIASTOLIC BLOOD PRESSURE: 55 MMHG | TEMPERATURE: 98 F

## 2023-08-28 LAB
ALBUMIN SERPL BCP-MCNC: 2.7 G/DL (ref 3.5–5.2)
ALP SERPL-CCNC: 43 U/L (ref 55–135)
ALT SERPL W/O P-5'-P-CCNC: 24 U/L (ref 10–44)
ANION GAP SERPL CALC-SCNC: 7 MMOL/L (ref 8–16)
AST SERPL-CCNC: 25 U/L (ref 10–40)
BASOPHILS # BLD AUTO: 0.03 K/UL (ref 0–0.2)
BASOPHILS NFR BLD: 0.4 % (ref 0–1.9)
BILIRUB SERPL-MCNC: 0.6 MG/DL (ref 0.1–1)
BUN SERPL-MCNC: 11 MG/DL (ref 8–23)
CALCIUM SERPL-MCNC: 9.2 MG/DL (ref 8.7–10.5)
CHLORIDE SERPL-SCNC: 102 MMOL/L (ref 95–110)
CO2 SERPL-SCNC: 28 MMOL/L (ref 23–29)
CREAT SERPL-MCNC: 0.6 MG/DL (ref 0.5–1.4)
DIFFERENTIAL METHOD: ABNORMAL
EOSINOPHIL # BLD AUTO: 0.1 K/UL (ref 0–0.5)
EOSINOPHIL NFR BLD: 1.4 % (ref 0–8)
ERYTHROCYTE [DISTWIDTH] IN BLOOD BY AUTOMATED COUNT: 13.3 % (ref 11.5–14.5)
EST. GFR  (NO RACE VARIABLE): >60 ML/MIN/1.73 M^2
GLUCOSE SERPL-MCNC: 94 MG/DL (ref 70–110)
HCT VFR BLD AUTO: 31.3 % (ref 37–48.5)
HGB BLD-MCNC: 10.5 G/DL (ref 12–16)
IMM GRANULOCYTES # BLD AUTO: 0.04 K/UL (ref 0–0.04)
IMM GRANULOCYTES NFR BLD AUTO: 0.5 % (ref 0–0.5)
LYMPHOCYTES # BLD AUTO: 1.5 K/UL (ref 1–4.8)
LYMPHOCYTES NFR BLD: 18 % (ref 18–48)
MAGNESIUM SERPL-MCNC: 1.9 MG/DL (ref 1.6–2.6)
MCH RBC QN AUTO: 30.3 PG (ref 27–31)
MCHC RBC AUTO-ENTMCNC: 33.5 G/DL (ref 32–36)
MCV RBC AUTO: 90 FL (ref 82–98)
MONOCYTES # BLD AUTO: 0.8 K/UL (ref 0.3–1)
MONOCYTES NFR BLD: 10.2 % (ref 4–15)
NEUTROPHILS # BLD AUTO: 5.6 K/UL (ref 1.8–7.7)
NEUTROPHILS NFR BLD: 69.5 % (ref 38–73)
NRBC BLD-RTO: 0 /100 WBC
PHOSPHATE SERPL-MCNC: 3.9 MG/DL (ref 2.7–4.5)
PLATELET # BLD AUTO: 202 K/UL (ref 150–450)
PMV BLD AUTO: 9.6 FL (ref 9.2–12.9)
POTASSIUM SERPL-SCNC: 4.5 MMOL/L (ref 3.5–5.1)
PROT SERPL-MCNC: 5.5 G/DL (ref 6–8.4)
RBC # BLD AUTO: 3.47 M/UL (ref 4–5.4)
SODIUM SERPL-SCNC: 137 MMOL/L (ref 136–145)
WBC # BLD AUTO: 8.07 K/UL (ref 3.9–12.7)

## 2023-08-28 PROCEDURE — 36415 COLL VENOUS BLD VENIPUNCTURE: CPT

## 2023-08-28 PROCEDURE — 99024 PR POST-OP FOLLOW-UP VISIT: ICD-10-PCS | Mod: POP,,, | Performed by: PHYSICIAN ASSISTANT

## 2023-08-28 PROCEDURE — 85025 COMPLETE CBC W/AUTO DIFF WBC: CPT | Performed by: NURSE PRACTITIONER

## 2023-08-28 PROCEDURE — 25000003 PHARM REV CODE 250: Performed by: NEUROLOGICAL SURGERY

## 2023-08-28 PROCEDURE — 25000003 PHARM REV CODE 250: Performed by: STUDENT IN AN ORGANIZED HEALTH CARE EDUCATION/TRAINING PROGRAM

## 2023-08-28 PROCEDURE — 99222 PR INITIAL HOSPITAL CARE,LEVL II: ICD-10-PCS | Mod: ,,, | Performed by: NURSE PRACTITIONER

## 2023-08-28 PROCEDURE — 25000003 PHARM REV CODE 250: Performed by: NURSE PRACTITIONER

## 2023-08-28 PROCEDURE — 80053 COMPREHEN METABOLIC PANEL: CPT

## 2023-08-28 PROCEDURE — 83735 ASSAY OF MAGNESIUM: CPT

## 2023-08-28 PROCEDURE — 63600175 PHARM REV CODE 636 W HCPCS: Performed by: STUDENT IN AN ORGANIZED HEALTH CARE EDUCATION/TRAINING PROGRAM

## 2023-08-28 PROCEDURE — 99222 1ST HOSP IP/OBS MODERATE 55: CPT | Mod: ,,, | Performed by: NURSE PRACTITIONER

## 2023-08-28 PROCEDURE — 25000003 PHARM REV CODE 250: Performed by: PHYSICIAN ASSISTANT

## 2023-08-28 PROCEDURE — 99024 POSTOP FOLLOW-UP VISIT: CPT | Mod: POP,,, | Performed by: PHYSICIAN ASSISTANT

## 2023-08-28 PROCEDURE — 84100 ASSAY OF PHOSPHORUS: CPT

## 2023-08-28 RX ORDER — AMOXICILLIN 250 MG
1 CAPSULE ORAL 2 TIMES DAILY
Start: 2023-08-28 | End: 2023-09-27

## 2023-08-28 RX ORDER — BISACODYL 10 MG
10 SUPPOSITORY, RECTAL RECTAL DAILY PRN
Refills: 0
Start: 2023-08-28 | End: 2023-09-27

## 2023-08-28 RX ORDER — OXYCODONE HYDROCHLORIDE 10 MG/1
10 TABLET ORAL EVERY 6 HOURS PRN
Refills: 0
Start: 2023-08-28 | End: 2023-12-20

## 2023-08-28 RX ORDER — HEPARIN SODIUM 5000 [USP'U]/ML
5000 INJECTION, SOLUTION INTRAVENOUS; SUBCUTANEOUS EVERY 8 HOURS
Start: 2023-08-28 | End: 2023-12-20

## 2023-08-28 RX ORDER — TALC
6 POWDER (GRAM) TOPICAL NIGHTLY PRN
Refills: 0
Start: 2023-08-28

## 2023-08-28 RX ORDER — METHOCARBAMOL 750 MG/1
750 TABLET, FILM COATED ORAL 4 TIMES DAILY
Qty: 40 TABLET | Refills: 0
Start: 2023-08-28 | End: 2023-09-07

## 2023-08-28 RX ORDER — METHOCARBAMOL 750 MG/1
750 TABLET, FILM COATED ORAL 4 TIMES DAILY
Status: DISCONTINUED | OUTPATIENT
Start: 2023-08-28 | End: 2023-08-28 | Stop reason: HOSPADM

## 2023-08-28 RX ORDER — LACTULOSE 10 G/15ML
20 SOLUTION ORAL 2 TIMES DAILY
Status: DISCONTINUED | OUTPATIENT
Start: 2023-08-28 | End: 2023-08-28 | Stop reason: HOSPADM

## 2023-08-28 RX ORDER — LIDOCAINE 50 MG/G
2 PATCH TOPICAL DAILY PRN
Status: DISCONTINUED | OUTPATIENT
Start: 2023-08-28 | End: 2023-08-28 | Stop reason: HOSPADM

## 2023-08-28 RX ORDER — LIDOCAINE 50 MG/G
2 PATCH TOPICAL DAILY PRN
Refills: 0
Start: 2023-08-28 | End: 2023-12-20

## 2023-08-28 RX ORDER — POLYETHYLENE GLYCOL 3350 17 G/17G
17 POWDER, FOR SOLUTION ORAL DAILY
Refills: 0
Start: 2023-08-29 | End: 2023-09-27

## 2023-08-28 RX ORDER — IBUPROFEN 400 MG/1
400 TABLET ORAL EVERY 4 HOURS PRN
Status: DISCONTINUED | OUTPATIENT
Start: 2023-08-28 | End: 2023-08-28

## 2023-08-28 RX ORDER — SILODOSIN 4 MG/1
4 CAPSULE ORAL DAILY
Qty: 7 CAPSULE | Refills: 0
Start: 2023-08-29 | End: 2023-12-20

## 2023-08-28 RX ADMIN — OXYCODONE HYDROCHLORIDE 10 MG: 10 TABLET ORAL at 08:08

## 2023-08-28 RX ADMIN — LACTULOSE 20 G: 20 SOLUTION ORAL at 11:08

## 2023-08-28 RX ADMIN — BISACODYL 10 MG: 10 SUPPOSITORY RECTAL at 11:08

## 2023-08-28 RX ADMIN — GABAPENTIN 300 MG: 300 CAPSULE ORAL at 03:08

## 2023-08-28 RX ADMIN — MUPIROCIN: 20 OINTMENT TOPICAL at 08:08

## 2023-08-28 RX ADMIN — OXYCODONE HYDROCHLORIDE 10 MG: 10 TABLET ORAL at 02:08

## 2023-08-28 RX ADMIN — IBUPROFEN 400 MG: 400 TABLET ORAL at 05:08

## 2023-08-28 RX ADMIN — SILODOSIN 4 MG: 4 CAPSULE ORAL at 08:08

## 2023-08-28 RX ADMIN — ENEMA 1 ENEMA: 19; 7 ENEMA RECTAL at 02:08

## 2023-08-28 RX ADMIN — SENNOSIDES AND DOCUSATE SODIUM 1 TABLET: 50; 8.6 TABLET ORAL at 08:08

## 2023-08-28 RX ADMIN — METHOCARBAMOL 750 MG: 750 TABLET ORAL at 05:08

## 2023-08-28 RX ADMIN — HEPARIN SODIUM 5000 UNITS: 5000 INJECTION INTRAVENOUS; SUBCUTANEOUS at 05:08

## 2023-08-28 RX ADMIN — METHOCARBAMOL 750 MG: 750 TABLET ORAL at 01:08

## 2023-08-28 RX ADMIN — GABAPENTIN 300 MG: 300 CAPSULE ORAL at 08:08

## 2023-08-28 RX ADMIN — POLYETHYLENE GLYCOL 3350 17 G: 17 POWDER, FOR SOLUTION ORAL at 08:08

## 2023-08-28 NOTE — PLAN OF CARE
[facility]    FACILITY TRANSFER ORDERS      Patient Name: Emilia Pablo  YOB: 1945    PCP: Dave Gibson MD   PCP Address: [address] [place] [address]  PCP Phone Number: [phone]  PCP Fax: [phone]    Encounter Date: 08/28/2023    Admit to: IPR    Vital Signs:  Routine    Diagnoses:   Active Hospital Problems    Diagnosis  POA    *Thoracic spinal stenosis [M48.04]  Yes     Emilia Pablo is a 78 y.o. female with PMH of osteoporosis- on Prolia, HTN, Abreu-Stickler Syndrome, worsening BLE weakness x 3 months who presents for evaluation of acute myelopathy.     - MRI cervical and thoracic spine shows moderate canal stenosis C4-5, T10-11 2/2 calcified disc herniation with cord signal change, multilevel foraminal stenosis.   - MRI lumbar spine with stable lumbar collection in subcutaneous tissues. No significant central stenosis. Prior MRI L spine 8/2/2023 limited evaluation 2/2 hardware artifact, but without severe canal stenosis.      POD1 T10-11 discectomy and extension/revision of prior fusion to T8-pelvis.   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       Allergies:Review of patient's allergies indicates:  No Known Allergies    Diet: regular diet    Activities: Activity as tolerated  [x]  No lifting greater than 5-10 pounds.  [x]  Avoid bending and twisting the area of your surgery more than 45 degrees from neutral position in any direction.  [x]  Wear your brace at all times except when lying in bed, showering, using the restroom, or performing hygiene tasks.     Goals of Care  Treatment Preferences:  Code Status: Full Code      Nursing: fall precautions     Labs: CBC and BMP  per facility protocol      CONSULTS:    Physical Therapy to evaluate and treat.  and Occupational Therapy to evaluate and treat.    MISCELLANEOUS CARE:  Facility provider to assess ongoing need for subcutaneous heparin or lovenox for DVT prophylaxis.      WOUND CARE ORDERS  Yes: Surgical Wound:  Location: back    Wound Care:  [x]  Remove the small dressing near your incision in 2 days.   [x]  No bandage required. Keep your incision open to the air.   [x]  You may shower on the 2nd day after your surgery. Keep the incision clean and dry at all times. Do not allow the force of water to hit the incision. If the incision gets damp, pat it dry. Do not rub or scrub the incision.  [x]  You cannot take a bath until 8 weeks after surgery.  [x]  Assess for staple removal at the rehab after 9/5 if incision is healed. Assess for suture removal after 9/12 if incision is healed.       Medications: Review discharge medications with patient and family and provide education.      [x]  Do not take ANY Aspirin or Aspirin containing products for 2 weeks after surgery (unless otherwise directed in discharge medication list).   [x]  Do not take ANY herbal supplements for 2 weeks after surgery.    [x]  Do not take ANY non-steroidal anti-inflammatory drugs (NSAIDS), including the following: ibuprofen, naprosyn, Aleve, Advil, Indocin, Mobic, or Celebrex for 12 weeks after surgery.     Current Discharge Medication List        START taking these medications    Details   bisacodyL (DULCOLAX) 10 mg Supp Place 1 suppository (10 mg total) rectally daily as needed (constipation).  Refills: 0    Comments: For use while admitted to rehab      heparin sodium,porcine (HEPARIN, PORCINE,) 5,000 unit/mL injection Inject 1 mL (5,000 Units total) into the skin every 8 (eight) hours.    Comments: For use while admitted to rehab      LIDOcaine (LIDODERM) 5 %  Place 2 patches onto the skin daily as needed (pain). Remove & Discard patch within 12 hours or as directed by MD    For use while admitted to rehab  Refills: 0      melatonin (MELATIN) 3 mg tablet Take 2 tablets (6 mg total) by mouth nightly as needed for Insomnia.  Refills: 0    Comments: For use while admitted to rehab      methocarbamoL (ROBAXIN) 750 MG Tab Take 1 tablet (750 mg total) by mouth 4 (four) times daily. for 10 days  Qty: 40 tablet, Refills: 0    Comments: For use while admitted to rehab      oxyCODONE (ROXICODONE) 10 mg Tab immediate release tablet Take 1 tablet (10 mg total) by mouth every 6 (six) hours as needed for Pain.  Refills: 0    Comments: Quantity prescribed more than 7 day supply? Yes, quantity medically necessary    For use while admitted to rehab      polyethylene glycol (GLYCOLAX) 17 gram PwPk Take 17 g by mouth once daily.  Refills: 0    Comments: For use while admitted to rehab      senna-docusate 8.6-50 mg (PERICOLACE) 8.6-50 mg per tablet Take 1 tablet by mouth 2 (two) times daily.    Comments: For use while admitted to rehab      silodosin (RAPAFLO) 4 mg Cap capsule Take 1 capsule (4 mg total) by mouth once daily. for 7 days  Qty: 7 capsule, Refills: 0    Comments: For use while admitted to rehab           CONTINUE these medications which have NOT CHANGED    Details   acetaminophen (TYLENOL) 650 MG TbSR Take 650 mg by mouth every 8 (eight) hours as needed (pain).      b complex vitamins capsule Take 1 capsule by mouth once daily.      denosumab (PROLIA) 60 mg/mL Syrg Inject 60 mg into the skin every 6 (six) months.      famotidine (PEPCID ORAL) Take 1 tablet by mouth daily as needed (heartburn).      gabapentin (NEURONTIN) 300 MG capsule 2 nightly and one during day as needed for nerve pain  Qty: 270 capsule, Refills: 3      glucosamine-chondroitin 500-400 mg tablet Take 1 tablet by mouth 2 (two) times daily.      loratadine (CLARITIN) 10 mg tablet Take 10 mg by mouth daily as  needed for Allergies.      olmesartan (BENICAR) 5 MG Tab TAKE 1 TABLET EVERY DAY  Qty: 90 tablet, Refills: 3    Associated Diagnoses: Essential hypertension      oxymetazoline (AFRIN) 0.05 % nasal spray 2 sprays by Nasal route daily as needed for Congestion.      vitamin D 1000 units Tab Take 2,000 Units by mouth once daily.      fish oil-omega-3 fatty acids 300-1,000 mg capsule Take 2 g by mouth once daily.           STOP taking these medications       celecoxib (CELEBREX) 200 MG capsule Comments:   Reason for Stopping:                  Immunizations Administered as of 8/28/2023       Name Date Dose VIS Date Route Exp Date    COVID-19, MRNA, LN-S, PF (Pfizer) (Purple Cap) 1/31/2021 10:15 AM 0.3 mL 12/12/2020 Intramuscular 5/31/2021    Site: Right deltoid     Given By: Aracelis Goodman, RN     : [employer]     Lot: [id number]     COVID-19, MRNA, LN-S, PF (Pfizer) (Purple Cap) 1/10/2021 10:28 AM 0.3 mL 12/12/2020 Intramuscular 4/30/2021    Site: Left deltoid     Given By: Mariela Maza LPN     : [employer]     Lot: [id number]             This patient has had both covid vaccinations    Some patients may experience side effects after vaccination.  These may include fever, headache, muscle or joint aches.  Most symptoms resolve with 24-48 hours and do not require urgent medical evaluation unless they persist for more than 72 hours or symptoms are concerning for an unrelated medical condition.          _________________________________  Rashida Swain PA-C  08/28/2023

## 2023-08-28 NOTE — PLAN OF CARE
[facility] ([facility])  Discharge Reassessment    Primary Care Provider: Dave Gibson MD    Expected Discharge Date: 8/30/2023    Patient is pending [facility].  Patient to discharge once patient has a bm.    Reassessment (most recent)       Discharge Reassessment - 08/28/23 1211          Discharge Reassessment    Assessment Type Discharge Planning Reassessment     Did the patient's condition or plan change since previous assessment? Yes     Discharge Plan discussed with: Patient     Communicated DAPHNEY with patient/caregiver Yes     Discharge Plan A Rehab     Discharge Plan B Home with family;Home Health     DME Needed Upon Discharge  none     Transition of Care Barriers None     Why the patient remains in the hospital Requires continued medical care        Post-Acute Status    Post-Acute Authorization Placement     Post-Acute Placement Status Pending medical clearance/testing     Discharge Delays None known at this time

## 2023-08-28 NOTE — ASSESSMENT & PLAN NOTE
Emilia Pablo is a 78 y.o. female with PMH of osteoporosis- on Prolia, HTN, Abreu-Stickler Syndrome, worsening BLE weakness x 3 months who presents for evaluation of acute myelopathy.     - MRI cervical and thoracic spine shows moderate canal stenosis C4-5, T10-11 2/2 calcified disc herniation with cord signal change, multilevel foraminal stenosis.   - MRI lumbar spine with stable lumbar collection in subcutaneous tissues. No significant central stenosis. Prior MRI L spine 8/2/2023 limited evaluation 2/2 hardware artifact, but without severe canal stenosis.      S/p T10-11 discectomy and extension/revision of prior fusion to T8-pelvis.     - TTF, neuro exam stable  - HV and WV removed. Leave incision open to air.  - post op xrays reviewed, hardware in good position  - tolerating diet  - voiding spontaneously after meza removed  - H/H stable  - Continue current regimen for pain control   - SQH for DVT ppx  - PT/OT/TLSO when up/OOB    Dispo: IPR pending BM

## 2023-08-28 NOTE — OP NOTE
DATE OF SURGERY: 8/22/23     PREOPERATIVE DIAGNOSIS:  1. 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 Scoliosis s/p L1 to sacrum fusion     POSTOPERATIVE DIAGNOSIS:  Same.     PROCEDURE PERFORMED:  1. Revision and extension of posterior spinal fusion, T8 to L2   2. Removal and reinsertion of posterior spinal hardware, L1 and L2  3. Posterior segmental spinal fixation, T8 to L2 (DeP[employer]s)  4. T10-11 laminectomy and bilateral facetectomies and discectomy for severe central and foraminal stenosis  5.  Rene osteotomy, T9, T10, T11 and T12  6.  Use of intraoperative fluoroscopy  7.  Use of intraoperative neuro-monitoring with MEPs  8. Infuse and local bone grafting  9. 10cm complex wound revision with advancement of paraspinal myofascial flaps     SURGEON: Pedrito Chand M.D.     ASSISTANT: Gina Burgess M.D.     ANESTHESIA: GETA     ESTIMATED BLOOD LOSS: 400mL     COMPLICATIONS: None     DRAINS: Two deep Hemovacs and a Prevena incisional woundvac     SPECIMENS SENT: None     FINDINGS: None     INDICATIONS:     78F with progressive thoracic myelopathy lost ambulation after a recent fall in the setting of a giant disc herniation at T10-11 causing cord compression and in the setting of scoliosis s/p L1-sacrum fusion. I recommended the above procedure. R/B/A/I/M were reviewed in detail and the patient wished to proceed. All questions were answered and no guarantees were made.  .  PROCEDURE:     The patient was brought into the operating room where she was intubated and placed under general anesthesia without difficulty.  All lines were placed. She was repositioned prone onto the operating room table with appropriate padding all pressure points and her head placed in GW tongs and hanging weight traction. The region of interest was localized with AP and lateral x-ray.  The skin was marked and the area was prepped and draped in the usual sterile fashion.  A timeout was  performed prior to procedure.  10 mL's of lidocaine with epinephrine were injected in the skin.     A midline linear incision was made with a 10 blade from approximately T8 to L2, incorporating the prior midline scar.  Supra and subfascial midline dissection was carried out with Bovie electrocautery.  Subperiosteal dissection was carried out with Bovie electrocautery and Nieto elevators to expose the posterior elements from T8 to L2 and the previous hardware at L1 and L2. Bilateral rods were cut below L2 screws and the L1 and L2 screws were removed on both sides. Larger diameter screws were placed into the prior tracts at bilateral L1 and L2. The fusion mass at L1-2 was also explored and found to be solid. Medial facetectomies were performed bilaterally at T8-9 through T12-L1 with the osteotome. Bilateral freehand screws were placed from T8 to T12. Xrays showed good screw position.       Next we performed a T10-11 discectomy from the right side. We started with a T10-11 laminectomy and bilateral facetectomies for severe central and foraminal stenosis. This was done in standard fashion with high speed drill and rongeurs. This decompression was more than what was needed to perform the discectomy. Next working form the right side we retracted the thecal sac to expose the giant T10-11 disc herniation. An annulotomy was performed with the 15 blade and copious extruded disc fragments were expressed through the annulotomy and removed with a pituitary. Adequate central and foraminal decompression was confirmed at T10-11 by [place] palpation.    Next we performed Rene osteotomies at T9, T10, T11 and T12 in standard fashion to mobilize the spine and reduce the scoliosis. High speed drill and Kerrison punches were used for this purpose.    Bilateral titanium rods were sized, contoured and reduced into the tulip heads.  Set screws were tightened. Final xrays showed excellent position of all hardware.  The wound was copiously  irrigated with sterile normal saline and a dilute Betadine solution. Exposed bony surfaces from T8 to L2 were decorticated bilaterally with a high-speed drill.  Infuse and local bone were placed posteriorly for arthrodesis from T8 to L2. Two deep Hemovacs placed. A 10cm complex wound revision was performed by advancing paraspinal myofascial flaps with the bovie. A watertight fascial closure was achieved with interrupted 0 Vicryl sutures and a running Stratafix suture.  The soft tissues were closed in layers. Staples and vertical mattress nylon sutures were placed at the skin and a Prevena dressing was applied.     The patient appeared to tolerate the procedure well from a hemodynamic and neuromonitoring standpoint. MEPs were present and stable in all extremities throughout the case.I was present for all critical portions of the case, and at the end of the case all counts were correct. The patient was repositioned supine onto the hospital bed where she was extubated and allowed to emerge from anesthesia. She was sent to the ICU in stable condition for recovery.    JUSTIFICATION OF MODIFIER 22: This was a complex revision and extension of fusion in a patient with a giant disc herniation causing spinal cord injury. It required significantly increased preop planning, mental effort, technical skill and time to perform it safely.

## 2023-08-28 NOTE — PT/OT/SLP PROGRESS
Occupational Therapy      Patient Name:  Emilia Pablo   MRN:  [id number]    Patient not seen today secondary to pt had just received enema when OT attempted, pt declining therapy at this time. Pt informed that OT would not be able to return again this PM, pt verbalized understanding & requested to be seen tomorrow instead. Will follow-up as appropriate on next available treatment date.  Samantha Delgado OT    8/28/2023

## 2023-08-28 NOTE — HOSPITAL COURSE
8/24/23: participated w/ OT. Pt able to take 2 steps forward and back with Max of 2, RW to promote functional mobility.

## 2023-08-28 NOTE — HPI
Emilia Pablo is a 78-year-old femal with PMHx of osteoporosis- on Prolia, HTN, and Abreu-Stickler Syndrome, 2001 cervical fusion and 2008 lumbosacral fusion, and chronic foot drop on left since surgery. Patient presented to [facility] [place] on 8/21/23 for BLE weakness x 1 month. MRI C/T/L spine demonstrating moderate canal stenosis T10-T11 2/2 calcified disc herniation with cord signal change now s/p T10-T11 discectomy with T8-pelvis extension and revision of prior fusion.     Functional History: Patient lives with son who is has vision impairment and unable to drive in a single story home with 1 step to enter.  Prior to admission, [facility]. DME: standard walker nd w/c if needed.

## 2023-08-28 NOTE — DISCHARGE SUMMARY
[facility] - Neurosurgery ([facility])  Neurosurgery  Discharge Summary      Patient Name: Emilia Pablo  MRN: [id number]  Admission Date: 8/21/2023  Hospital Length of Stay: 7 days  Discharge Date and Time:  08/28/2023 3:22 PM  Attending Physician: Pedrito Chand MD   Discharging Provider: Rsahida Swain PA-C  Primary Care Provider: Dave Gibson MD    HPI:   Emilia Pablo is a 78 y.o. female with PMH of osteoporosis- on Prolia, HTN, and Abreu-Stickler Syndrome. She has a surgical hx of 2001 cervical fusion and 2008 lumbosacral fusion - EJ - did well from surgery, chronic foot drop on left since surgery. She was evaluated by Dr. Lemos NSGY one month ago for weakness, gait imbalance and LLE weakness and the patient was referred to Dr. Chand for further evaluation. She was not able to complete the outpatient workup due to new neuro deficits. Reports a fall trying to transfer from the chair to the toilet. She denies hitting her head/back or LOC. Reports since then she noticed worsening weakness RLE>LLE and some bowel incontinence. She feels the urge to have a BM but reports episodes of incontinence. Also notes urinary urgency x 2 weeks. Prakash placed in ED due to retention, bladder scan 660cc without the urge to void. She has nondermatomal patchy numbness in the BLE in the feet and thighs. Denies saddle anesthesia, UE weakness or numbness. NSGY consulted for evaluation.       Procedure(s) (LRB):  FUSION, SPINE (N/A)     Hospital Course: 8/22: NAEON. NPO today in anticipation of OR for T10-11 discectomy with T8-L2 fusion. Neuro exam stable.   8/23: POD 1 s/p T10-11 discectomy w T8-L2 fusion. NAEON  8/24: POD 2 s/p T10-11 discectomy w T8-L2 fusion. Hypotensive episodes  8/25: Hb responded well to 2 U PRBC. Hb 10.3 this morning. Hypoension resolving. Lower extremity strength improving.   8/26: neuro stable but complaining of right hip pain. Left leg strength has improved a lot since surgery. Drain with about 100cc  serosanguinous output. PT to work with her this weekend.   8/27: POD 5. NAEON. Drain put out 60cc serosanguinous fluid overnight. Hb increased to 10.7. PT did not see yesterday, should see today.  8/28: NAEON. AFVSS. Labs wnl. Exam stable. Up in chair. Eager to continue therapy to improve mobility. HV and WV removed. Scheduled robaxin for LE spasms, added lidocaine patch for b/l hip pain. Pending discharge to [facility] once she has a post-op BM.      Goals of Care Treatment Preferences:  Code Status: Full Code      Consults:   Consults (From admission, onward)        Status Ordering Provider     Inpatient consult to Physical Medicine Rehab  Once        Provider:  (Not yet assigned)    Completed JOCELYNE CASTREJON     IP consult to case management  Once        Provider:  (Not yet assigned)    Acknowledged JOCELYNE CASTREJON     Inpatient consult to Neurosurgery  Once        Provider:  (Not yet assigned)    Completed APRIL BERRY          Significant Diagnostic Studies: Labs:   BMP:   Recent Labs   Lab 08/27/23  0407 08/28/23  0253    94    137   K 5.0 4.5    102   CO2 26 28   BUN 12 11   CREATININE 0.6 0.6   CALCIUM 9.0 9.2   MG 1.9 1.9   , CBC   Recent Labs   Lab 08/27/23  0407 08/28/23  0253   WBC 6.14 8.07   HGB 10.7* 10.5*   HCT 32.0* 31.3*    202    and All labs within the past 24 hours have been reviewed  Radiology:   Imaging Results          MRI Lumbar Spine Without Contrast (Final result)  Result time 08/22/23 01:15:07    Final result by Ayush Back MD (08/22/23 01:15:07)                 Impression:      Postoperative changes L1 through S1 posterior instrumented fusion.  There is susceptibility artifact which limits evaluation.    Multilevel degenerative changes as above, noting susceptibility artifact limits evaluation of the neural foramina at multiple levels.  Findings most significantly contribute to severe left neural foraminal narrowing at L5-S1.  No high-grade canal  stenosis.    Stable fluid collection in the subcutaneous tissues at the operative bed.    Additional findings discussed above and identified on same day thoracic spine MRI.    Electronically signed by resident: Juan Wright  Date:    08/22/2023  Time:    00:09    Electronically signed by: Ayush Back MD  Date:    08/22/2023  Time:    01:15             Narrative:    EXAMINATION:  MRI LUMBAR SPINE WITHOUT CONTRAST    CLINICAL HISTORY:  Low back pain, progressive neurologic deficit;    TECHNIQUE:  Multiplanar, multisequence MR images were acquired from the thoracolumbar junction to the sacrum without contrast.    COMPARISON:  MRI T-spine same date; MRI L-spine 08/02/2023    FINDINGS:  Postoperative changes L1 through S1 posterior instrumented fusion.    Alignment: Scoliotic curvature of the lumbar spine.  Grade 1 anterolisthesis of L3 on L4 and L5 on S1.    Vertebrae: Vertebral body heights are maintained.  No fracture or marrow infiltrative process.  Edema signal in the lower thoracic vertebra better evaluated on same day thoracic spine MRI.    Discs: Multilevel degenerative disc desiccation and height loss, most severe at L1-L2.    Cord: Normal signal intensity and morphology.  Conus terminates at L1 and appears unremarkable.  Cauda equina appears unremarkable and similar to prior MRI.    Paraspinal muscles & soft tissues: Fluid collection measuring 2.4 x 3.4 x 10.6 cm within the subcutaneous tissues extending from the level of L2 through L5, similar to 08/02/2023 exam.  Moderate paraspinal muscle atrophy.  Limited evaluation of the partially imaged abdominopelvic organs demonstrates no acute abnormality.    Degenerative findings:    Partially imaged lower thoracic spine is further evaluated on MRI T-spine from same date.    T12-L1: Mild diffuse disc bulge, mild facet arthropathy ligamentum flavum thickening.  Findings contribute to mild canal stenosis and mild right neural foraminal narrowing.    L1-L2:  Postoperative changes.  No significant focal disc abnormality.  Adjacent susceptibility artifact limits evaluation of the neural foramina.  No central canal stenosis.  Small left foraminal annular fissure.    L2-L3: Postoperative changes.  No significant focal disc abnormality.  Adjacent susceptibility artifact limits evaluation of the neural foramina.  No central canal stenosis.    L3-L4: Postoperative changes.  Mild diffuse disc bulge.  Adjacent susceptibility artifact limits evaluation of the neural foramina.  No central canal stenosis.    L4-L5: Postoperative changes.  Diffuse disc bulge.  Findings contribute to mild right, moderate left neural foraminal narrowing.    L5-S1: Postoperative changes.  Diffuse disc bulge.  Findings contribute to moderate right, severe left narrowing.                               US Lower Extremity Veins Bilateral (Final result)  Result time 08/21/23 23:11:32    Final result by Armando Redding MD (08/21/23 23:11:32)                 Impression:      1. No evidence of DVT bilaterally.  2. Baker cyst in the right popliteal fossa.      Electronically signed by: Armando Redding  Date:    08/21/2023  Time:    23:11             Narrative:    EXAMINATION:  US LOWER EXTREMITY VEINS BILATERAL    CLINICAL HISTORY:  R/O DVT;    TECHNIQUE:  Duplex and color flow Doppler and dynamic compression was performed of the bilateral lower extremity veins was performed.    COMPARISON:  None    FINDINGS:  Right thigh veins: The common femoral, femoral, popliteal, upper greater saphenous, and deep femoral veins are patent and free of thrombus. The veins are normally compressible and have normal phasic flow and augmentation response.    Right calf veins: The visualized calf veins are patent.    Left thigh veins: The common femoral, femoral, popliteal, upper greater saphenous, and deep femoral veins are patent and free of thrombus. The veins are normally compressible and have normal phasic flow and  augmentation response.    Left calf veins: The visualized calf veins are patent.    Miscellaneous: 6.6 x 1.4 x 3.2 cm Baker cyst in the right popliteal fossa.                               US Abdomen Complete (Final result)  Result time 08/22/23 00:20:19    Final result by Ayush Back MD (08/22/23 00:20:19)                 Impression:      Hepatic cyst in the left lobe which correlates with MRI T-spine from same date.  No suspicious focal liver lesions.    Cholelithiasis.  Mild gallbladder wall thickening without other convincing signs of acute cholecystitis.    Left kidney simple cyst.    Electronically signed by resident: Juan Wright  Date:    08/21/2023  Time:    23:40    Electronically signed by: Ayush Back MD  Date:    08/22/2023  Time:    00:20             Narrative:    EXAMINATION:  US ABDOMEN COMPLETE    CLINICAL HISTORY:  further evaluation several T2 hyperintense lesions within the liver;    TECHNIQUE:  Complete abdominal ultrasound (including pancreas, aorta, liver, gallbladder, common bile duct, IVC, kidneys, and spleen) was performed.    COMPARISON:  CT L-spine, MRI T-spine from same date    FINDINGS:  Pancreas: The visualized portions of pancreas appear normal.    Aorta: Visualized portions demonstrate no aneurysm.    Liver: 14.1 cm, normal in size. Homogeneous parenchymal echotexture. Hepatic cyst with 1 mm septation in the left lobe measuring 1.6 x 1.8 x 1.7 cm.    Gallbladder: Several gallstones are present, largest measuring 6 mm.  Borderline gallbladder wall thickening measuring up to 4 mm in thickness.  No pericholecystic fluid.  No sonographic Rosas's sign.    Biliary system: 4 mm common bile duct.  No intrahepatic ductal dilatation.    Inferior vena cava: Visualized portions are normal in appearance.    Right kidney: 9.5 cm. No hydronephrosis.    Left kidney: 10.5 cm. Simple cyst in the interpolar region measuring 1.1 x 1.2 x 0.9 cm.    Spleen: 9.1 x 3.2 cm.  Normal in size with  homogeneous echotexture.    Miscellaneous: No ascites.                               X-Ray Chest AP Portable (Final result)  Result time 08/21/23 19:28:40    Final result by Jovan Cartagena MD (08/21/23 19:28:40)                 Impression:      1. No convincing acute cardiopulmonary process noting coarse interstitial attenuation suggests underlying COPD/emphysema.  Correlation is advised.      Electronically signed by: Jovan Cartagena MD  Date:    08/21/2023  Time:    19:28             Narrative:    EXAMINATION:  XR CHEST AP PORTABLE    CLINICAL HISTORY:  pre-op;    TECHNIQUE:  Single frontal view of the chest was performed.    COMPARISON:  None    FINDINGS:  The cardiomediastinal silhouette is not enlarged.  There is no pleural effusion.  The trachea is midline.  The lungs are symmetrically expanded bilaterally with coarse interstitial attenuation..  No large focal consolidation seen.  There is no pneumothorax.  The osseous structures are remarkable for degenerative changes and osteopenia.  Surgical changes noted of the spine.  Prominent degenerative change noted of the right shoulder..                               CT Lumbar Spine Without Contrast (Final result)  Result time 08/21/23 18:41:18    Final result by Jovan Cartagena MD (08/21/23 18:41:18)                 Impression:      1. High attenuating lobular focus lies posterior to the T10 vertebral body, correlating with low signal focus on previous MRI.  This is suspected to reflect herniated nucleus polyposis in the setting of advanced degenerative disc disease as there is vacuum disc phenomenon at the T10-T11 disc space.  Although there is a punctate focus of air in the region, this is likely related to vacuum disc phenomenon extending from the joint space and left facet rather than infectious process.  No significant soft tissue edema in the region to support a diagnosis of infection.  Correlation however is advised, and correlation with patient's  symptomatology and findings on prior MRI advised.  2. Evolving surgical changes of the lumbar spine as described noting resolution of prior postoperative fluid collection.  No definite fracture although evaluation for the same is significantly limited given the extent of motion artifact.  3. Please see above for several additional findings.      Electronically signed by: Jovan Cartagena MD  Date:    08/21/2023  Time:    18:41             Narrative:    EXAMINATION:  CT LUMBAR SPINE WITHOUT CONTRAST; CT THORACIC SPINE WITHOUT CONTRAST    CLINICAL HISTORY:  Spinal stenosis, lumbar;; Myelopathy, acute, thoracic spine;    TECHNIQUE:  Axial images of the thoracic spine and axial images of the lumbar spine were obtained at 1.25 mm intervals without administration of IV contrast.  Coronal and sagittal reformatted images were reviewed.    COMPARISON:  MRI thoracic spine 08/21/2023, MRI lumbar spine 08/02/2023    FINDINGS:  Lumbar spine:    There is motion artifact.    There is partially visualized right hip arthroplasty.  There is osteopenia.  Posterior fusion hardware spans L1 through S1, hardware appears intact.  Allowing for motion artifact, no definite acute displaced fracture or dislocation of the lumbar spine.  There is stable appearing multilevel disc space height loss and endplate degenerative changes.  There is multilevel lumbar laminectomy.  In comparison to the MRI 08/02/2023, the fluid collection within the posterior aspect of the operative bed has resolved although artifact and motion limit evaluation.  No convincing new collection in the region.    Thoracic spine:    There is motion artifact.    There is osteopenia.  No visualized acute displaced rib fracture.  There is multilevel endplate degenerative change and disc space narrowing, better evaluated on prior MRI.  Correlating with low signal a high attenuating lobular focus posterior to the T10-T11 level, primarily posterior to T10.  This measures 1.8 x 8.0  cm.  There are a few punctate foci of gas adjacent to the posterior aspect of T10 noting vacuum disc phenomenon involving both the T10-T11 disc space as well as the T10-T11 facet on the left.  This gas likely reflects sequela of the same, no overt findings to suggest infection.  Although better evaluated on the previous MRI, this focus significantly narrows the spinal canal.  The facet joints are aligned noting multilevel facet arthropathy.  Degenerative changes are detailed on previous MRI.    There is a calcified granuloma within the medial aspect of the right upper lobe.  There is minimal bilateral basilar dependent atelectasis.  There is calcification of the aorta.  There is a small hiatal hernia.  There is questionable cholelithiasis.  There is a large amount of stool in the right colon, may reflect sequela of constipation.                               CT Thoracic Spine Without Contrast (Final result)  Result time 08/21/23 18:41:18    Final result by Jovan Cartagena MD (08/21/23 18:41:18)                 Impression:      1. High attenuating lobular focus lies posterior to the T10 vertebral body, correlating with low signal focus on previous MRI.  This is suspected to reflect herniated nucleus polyposis in the setting of advanced degenerative disc disease as there is vacuum disc phenomenon at the T10-T11 disc space.  Although there is a punctate focus of air in the region, this is likely related to vacuum disc phenomenon extending from the joint space and left facet rather than infectious process.  No significant soft tissue edema in the region to support a diagnosis of infection.  Correlation however is advised, and correlation with patient's symptomatology and findings on prior MRI advised.  2. Evolving surgical changes of the lumbar spine as described noting resolution of prior postoperative fluid collection.  No definite fracture although evaluation for the same is significantly limited given the extent  of motion artifact.  3. Please see above for several additional findings.      Electronically signed by: Jovan Cartagena MD  Date:    08/21/2023  Time:    18:41             Narrative:    EXAMINATION:  CT LUMBAR SPINE WITHOUT CONTRAST; CT THORACIC SPINE WITHOUT CONTRAST    CLINICAL HISTORY:  Spinal stenosis, lumbar;; Myelopathy, acute, thoracic spine;    TECHNIQUE:  Axial images of the thoracic spine and axial images of the lumbar spine were obtained at 1.25 mm intervals without administration of IV contrast.  Coronal and sagittal reformatted images were reviewed.    COMPARISON:  MRI thoracic spine 08/21/2023, MRI lumbar spine 08/02/2023    FINDINGS:  Lumbar spine:    There is motion artifact.    There is partially visualized right hip arthroplasty.  There is osteopenia.  Posterior fusion hardware spans L1 through S1, hardware appears intact.  Allowing for motion artifact, no definite acute displaced fracture or dislocation of the lumbar spine.  There is stable appearing multilevel disc space height loss and endplate degenerative changes.  There is multilevel lumbar laminectomy.  In comparison to the MRI 08/02/2023, the fluid collection within the posterior aspect of the operative bed has resolved although artifact and motion limit evaluation.  No convincing new collection in the region.    Thoracic spine:    There is motion artifact.    There is osteopenia.  No visualized acute displaced rib fracture.  There is multilevel endplate degenerative change and disc space narrowing, better evaluated on prior MRI.  Correlating with low signal a high attenuating lobular focus posterior to the T10-T11 level, primarily posterior to T10.  This measures 1.8 x 8.0 cm.  There are a few punctate foci of gas adjacent to the posterior aspect of T10 noting vacuum disc phenomenon involving both the T10-T11 disc space as well as the T10-T11 facet on the left.  This gas likely reflects sequela of the same, no overt findings to suggest  infection.  Although better evaluated on the previous MRI, this focus significantly narrows the spinal canal.  The facet joints are aligned noting multilevel facet arthropathy.  Degenerative changes are detailed on previous MRI.    There is a calcified granuloma within the medial aspect of the right upper lobe.  There is minimal bilateral basilar dependent atelectasis.  There is calcification of the aorta.  There is a small hiatal hernia.  There is questionable cholelithiasis.  There is a large amount of stool in the right colon, may reflect sequela of constipation.                               MRI Thoracic Spine Without Contrast (Final result)  Result time 08/21/23 15:10:44    Final result by Santos Pacheco MD (08/21/23 15:10:44)                 Impression:      1. Disc extrusion likely containing vacuum phenomenon or calcification at T10-T11 resulting in severe spinal canal stenosis.  Associated spinal cord edema or myelomalacia noted.  2. Fluid signal within the T10-T11 disc space favored to be degenerative.  Absence of endplate erosion and probable associated vacuum phenomenon favor degenerative etiology.  Recommend further evaluation with CT as well as clinical assessment.  3. Several T2 hyperintense lesions within the liver, incompletely characterized.  Recommend abdominal ultrasound for further evaluation.      Electronically signed by: Santos Pacheco MD  Date:    08/21/2023  Time:    15:10             Narrative:    EXAMINATION:  MRI THORACIC SPINE WITHOUT CONTRAST    CLINICAL HISTORY:  Mid-back pain;Trunk numbness or tingling;    TECHNIQUE:  Multiplanar, multisequence MRI of thoracic spine.  Contrast was not administered.    COMPARISON:  None    FINDINGS:  There are partially visualized postoperative changes in the lower cervical and upper lumbar spine.    Alignment: Normal.    Vertebrae: There is multilevel mild disc height loss with Schmorl's nodes.  No evidence for acute fracture.    Discs: Fluid signal  noted within the T10-T11 disc space.  There is underlying sub endplate marrow edema but no endplate erosion.  There is extruded low signal material extending cranially along the T10 posterior cortex, perhaps vacuum phenomenon within a disc extrusion.    Cord: T2 hyperintense signal seen within the spinal cord at T10-T11.    Degenerative findings: Disc extrusion at T10-T11 with low signal, perhaps vacuum phenomenon extends cranially along the T10 posterior cortex and measures 1.9 x 1.0 x 1.7 cm (TV by AP by CC), resulting in severe spinal canal stenosis.  Note made of mild bilateral neural foraminal narrowing.  Additional small disc protrusion noted at T1-T2, T6-T7 and T7-T8.  No spinal canal stenosis or neural foraminal narrowing elsewhere in the thoracic spine.    Paraspinal muscles & soft tissues: Several T2 hyperintense lesions are seen within the liver, incompletely characterized.                               MRI Cervical Spine Without Contrast (Final result)  Result time 08/21/23 15:49:45    Final result by Santos Pacheco MD (08/21/23 15:49:45)                 Impression:      1. Postoperative change of ACDF at C5-C7.  2. Multilevel degenerative changes detailed above.  Severe spinal canal stenosis noted at C3-C4, moderate at C2-C3, C4-C5, and C6-C7.  Moderate to severe neural foraminal narrowing noted at C3-C5 and C6-T1.      Electronically signed by: Santos Pacheco MD  Date:    08/21/2023  Time:    15:49             Narrative:    EXAMINATION:  MRI CERVICAL SPINE WITHOUT CONTRAST    CLINICAL HISTORY:  Spinal stenosis, cervical;    TECHNIQUE:  Multiplanar, multisequence MR images of the cervical spine were performed utilizing no contrast.    COMPARISON:  None.    FINDINGS:  Postoperative change of ACDF noted at C5-C7.  Solid osseous fusion seen across the disc spaces.  No evidence for hardware loosening.    C1-C2: Dens is intact.  Pre dens space is maintained.    Alignment: Grade 1 retrolisthesis at C2-C3 and  C4-C5.  Grade 1 anterolisthesis at C3-C4.    Vertebrae: No acute fracture.  No evidence for marrow infiltrative process.    Discs: Moderate to severe disc height loss at C3-C5.  No evidence for discitis.    Cord: Normal signal.    Skull base and craniocervical junction: Normal.    Degenerative findings:    C2-C3: Posterior disc osteophyte complex and facet arthropathy result in moderate spinal canal stenosis and mild right neural foraminal narrowing.    C3-C4: Posterior disc osteophyte complex with uncovertebral spurring and severe facet arthropathy result in severe spinal canal stenosis and severe right, moderate left neural foraminal narrowing.    C4-C5: Posterior disc osteophyte complex with uncovertebral spurring and moderate facet arthropathy result in moderate spinal canal stenosis and severe left, moderate right neural foraminal narrowing.    C5-C6: Postoperative changes.  Mild spinal canal stenosis and mild bilateral neural foraminal narrowing.    C6-C7: Postoperative changes.  Moderate spinal canal stenosis and moderate right, mild left neural foraminal narrowing.    C7-T1: Moderate facet arthropathy results in moderate bilateral neural foraminal narrowing.    Paraspinal muscles & soft tissues: Unremarkable.                               XR Cervical Spine AP Lateral w/ Flex Ext and Swimmers Flex Ext Without Odontoid (Final result)  Result time 08/21/23 15:41:39    Final result by Trell Church MD (08/21/23 15:41:39)                 Impression:      No fracture.  Additional findings above.      Electronically signed by: Trell Church MD  Date:    08/21/2023  Time:    15:41             Narrative:    EXAMINATION:  XR CERVICAL SPINE AP LATERAL W/ FLEX EXT AND SWIMMERS FLEX EXT WITHOU    CLINICAL HISTORY:  stenosis and new LLE weakness, sees nsx;    TECHNIQUE:  Seven views cervical spine.    COMPARISON:  Scoliosis exam 07/20/2023    FINDINGS:  Postop anterior interbody fusion C5-C6 and C6-C7  levels, stable, no hardware failure.  Prominent DJD anterior C1-C2 with anterior juxta-articular, facet joint calcification, mild moderate degenerative disc spondylosis C3 and particularly C4 with primary anterior subluxation C3 on C4 and C5 on C4.  Prompt facet joint arthropathy C3 and C4 disc levels.  Minimal mild remote anterior wedge deformity few included mid dorsal spine vertebral bodies with mild anterior spondylosis change.  Airway neck soft tissues normal.  Straightening usual lordotic curve, neck muscle spasm.                               X-ray Lumbar Spine Flexion And Extension Only (Final result)  Result time 08/21/23 14:29:36    Final result by Kenny Rodrigez MD (08/21/23 14:29:36)                 Impression:      Allowing for the above limitations, flexion and extension lateral views of the lumbar spine demonstrate no subluxation.      Electronically signed by: Kenny Rodrigez  Date:    08/21/2023  Time:    14:29             Narrative:    EXAMINATION:  XR LUMBAR SPINE FLEXION AND EXTENSION ONLY    CLINICAL HISTORY:  stenosis new LLE weakness ordered by nsx;    TECHNIQUE:  Lateral radiographs of the lumbar spine are obtained while the patient performed flexion and then while the patient performed extension    COMPARISON:  Lumbar spine radiographs 07/10/2023.    The mid correlation is also made with lumbar spine MRI of 08/02/2023 and with the lumbar spine CT of 07/21/2023.    FINDINGS:  Body habitus limits visualization of osseous detail.    The L1-S1 posterior fusion construct demonstrates no adverse interval radiographic change.    Pre-existing multilevel degenerative changes in the lumbar spine and partially visualized lower thoracic spine.  Pre-existing mild anterior vertebral body wedging in the region of the thoracolumbar junction, again most pronounced at T12 with some anterior vertebral body wedging of L1, T11, T10, and T9.  The lower thoracic vertebra are specially poorly visualized,  however.    The range of motion demonstrated in flexion and extension is markedly limited, but no will vertebral subluxation is demonstrated.    Partially visualized bilateral THAs.    Sacrococcygeal spine not fully visualized.                               X-Ray Hip 2 or 3 views Left (with Pelvis when performed) (Final result)  Result time 08/21/23 14:20:05    Final result by Kenny Rodrigez MD (08/21/23 14:20:05)                 Impression:      Allowing for the above limitations, no acute radiographic abnormality is demonstrated in the left total hip prosthesis or visualized portion of the osseous pelvic ring.    Partially visualized right total hip prosthesis.      Electronically signed by: Kenny Rodrigez  Date:    08/21/2023  Time:    14:20             Narrative:    EXAMINATION:  XR HIP WITH PELVIS WHEN PERFORMED, 2 OR 3 VIEWS LEFT    CLINICAL HISTORY:  mult falls;    TECHNIQUE:  AP view of the pelvis and frog leg lateral view of the left hip were performed.    COMPARISON:  Right hip radiograph 05/11/2015    FINDINGS:  Partially visualized multilevel lumbar fusion construct.    Sacrum partially obscured by superimposed viscera and pubic bones.    The left JOO and visualized portion of the right JOO demonstrate no acute radiographic abnormality.  The distal portion of the right JOO femoral stem is not fully included in these images.                                  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 Date: 08/28/24     Order Specific Question Answer Comments   May the Radiologist modify the order per protocol to meet the clinical needs of the patient? Yes      Notify your health care provider if you experience any of the following:  temperature >100.4     Notify your health care provider if you experience any of the following:  persistent nausea and vomiting or diarrhea     Notify your health care provider if you experience any of the following:  severe uncontrolled pain     Notify your health care provider if you experience any of the following:  redness, tenderness, or signs of infection (pain, swelling, redness, odor or green/yellow discharge around incision site)     Notify your health care provider if you experience any of the following:  difficulty breathing or increased cough     Notify your health care provider if you experience any of the following:  severe persistent headache     Notify your health care provider if you experience any of the following:  worsening rash     Notify your health care provider if you experience any of the following:  persistent dizziness, light-headedness, or visual disturbances     Notify your health care provider if you experience any of the following:  increased confusion or weakness     Activity as tolerated     Medications:  Reconciled Home Medications:      Medication List      START taking these medications    bisacodyL 10 mg Supp  Commonly known as: DULCOLAX  Place 1 suppository (10 mg total) rectally daily as needed (constipation).     heparin (porcine) 5,000 unit/mL injection  Inject 1 mL (5,000 Units total) into the skin every 8 (eight) hours.     LIDOcaine 5 %  Commonly known as: LIDODERM  Place 2 patches onto the skin daily as needed (pain). Remove & Discard patch within  12 hours or as directed by MD    For use while admitted to rehab     melatonin 3 mg tablet  Commonly known as: MELATIN  Take 2 tablets (6 mg total) by mouth nightly as needed for Insomnia.     methocarbamoL 750 MG Tab  Commonly known as: ROBAXIN  Take 1 tablet (750 mg total) by mouth 4 (four) times daily. for 10 days     oxyCODONE 10 mg Tab immediate release tablet  Commonly known as: ROXICODONE  Take 1 tablet (10 mg total) by mouth every 6 (six) hours as needed for Pain.     polyethylene glycol 17 gram Pwpk  Commonly known as: GLYCOLAX  Take 17 g by mouth once daily.  Start taking on: August 29, 2023     senna-docusate 8.6-50 mg 8.6-50 mg per tablet  Commonly known as: PERICOLACE  Take 1 tablet by mouth 2 (two) times daily.     silodosin 4 mg Cap capsule  Commonly known as: RAPAFLO  Take 1 capsule (4 mg total) by mouth once daily. for 7 days  Start taking on: August 29, 2023        CHANGE how you take these medications    gabapentin 300 MG capsule  Commonly known as: NEURONTIN  2 nightly and one during day as needed for nerve pain  What changed:   · how much to take  · how to take this  · when to take this  · additional instructions        CONTINUE taking these medications    acetaminophen 650 MG Tbsr  Commonly known as: TYLENOL  Take 650 mg by mouth every 8 (eight) hours as needed (pain).     b complex vitamins capsule  Take 1 capsule by mouth once daily.     fish oil-omega-3 fatty acids 300-1,000 mg capsule  Take 2 g by mouth once daily.     glucosamine-chondroitin 500-400 mg tablet  Take 1 tablet by mouth 2 (two) times daily.     loratadine 10 mg tablet  Commonly known as: CLARITIN  Take 10 mg by mouth daily as needed for Allergies.     olmesartan 5 MG Tab  Commonly known as: BENICAR  TAKE 1 TABLET EVERY DAY     oxymetazoline 0.05 % nasal spray  Commonly known as: AFRIN  2 sprays by Nasal route daily as needed for Congestion.     PEPCID ORAL  Take 1 tablet by mouth daily as needed (heartburn).     PROLIA 60  mg/mL Syrg  Generic drug: denosumab  Inject 60 mg into the skin every 6 (six) months.     vitamin D 1000 units Tab  Commonly known as: VITAMIN D3  Take 2,000 Units by mouth once daily.        STOP taking these medications    celecoxib 200 MG capsule  Commonly known as: CeleBREX            Rashida Swain PA-C  Neurosurgery  [facility])

## 2023-08-28 NOTE — DISCHARGE INSTRUCTIONS
Post op Spine Patient Instructions    Activity Restrictions:  [x]  Return to work will be determined on an individual basis.  [x]  No lifting greater than 5-10 pounds.  [x]  Avoid bending and twisting the area of your surgery more than 45 degrees from neutral position in any direction.  [x]  No driving or operating machinery:  [x]  until cleared by your surgeon.  [x]  while taking narcotic pain medications or muscle relaxants.  [x]  Wear your brace at all times except when lying in bed, showering, using the restroom, or performing hygiene tasks.   [x]  Increase ambulation over the next 2 weeks. Walk on paved surfaces only. It is okay to walk up and down stairs while holding onto a side rail.    Discharge Medication/Follow-up:  [x]  Please refer to discharge medication reconciliation form.  [x]  Take Tylenol (acetaminophen) 650 mg every 6 hours as needed for additional pain control.  [x]  Do not take ANY Aspirin or Aspirin containing products for 2 weeks after surgery (unless otherwise directed in discharge medication list).   [x]  Do not take ANY herbal supplements for 2 weeks after surgery.    [x]  Do not take ANY non-steroidal anti-inflammatory drugs (NSAIDS), including the following: ibuprofen, naprosyn, Aleve, Advil, Indocin, Mobic, or Celebrex for 12 weeks after surgery.   [x]  Prescriptions for appropriate medication will be given upon discharge.  [x]  Take the pain medication as prescribed. We recommend to wean use of your pain medication after 1 week of taking as prescribed (Ex: After 1 week of taking every 4 hours as needed, wean down to taking your pain medication every 6 hours as needed).  [x]  Take docusate (Colace 100 mg): take one capsule a day as needed for constipation. You can get this over the counter.  [x]  Follow-up appointment:  [x]  10-14 days post-op for wound check by physician assistant/nurse  [x]  4-6 weeks with MD:  [x]  with x-rays  [x]  An appointment will be mailed to you.    Wound  Care:  [x]  Remove the small dressing near your incision in 2 days.   [x]  No bandage required. Keep your incision open to the air.   [x]  You may shower on the 2nd day after your surgery. Keep the incision clean and dry at all times. Do not allow the force of water to hit the incision. If the incision gets damp, pat it dry. Do not rub or scrub the incision.  [x]  You cannot take a bath until 8 weeks after surgery.  [x]  Assess for staple removal at the rehab after 9/5 if incision is healed. Assess for suture removal after 9/12 if incision is healed.     Call your doctor or go to the Emergency Room for any signs of infection, including: increased redness, drainage, pain, or fever (temperature ?101). Call your doctor or go to the Emergency Room if there are any localized neurological changes; problems with speech, vision, numbness, tingling, weakness, or severe headache; inability to control urination or bowel movements; inability to urinate; or for other concerns.      Special Instructions:  [x]  No use of tobacco products.  [x]  Diet: Please eat a regular diet as tolerated.      Physicians need 3 days' notice for pain medicine to be refilled. Pain medicine will only be refilled between 8 AM and 5 PM, Monday through Friday, due to Food and Drug Administration regulation of documentation.    If you have any questions about this form, please call [phone].    Form No. 03622 (Revised 10/31/2013)

## 2023-08-28 NOTE — PROGRESS NOTES
[facility] ([facility])  Neurosurgery  Progress Note    Subjective:     History of Present Illness: Emilia Pablo is a 78 y.o. female with PMH of osteoporosis- on Prolia, HTN, and Abreu-Stickler Syndrome. She has a surgical hx of 2001 cervical fusion and 2008 lumbosacral fusion - EJ - did well from surgery, chronic foot drop on left since surgery. She was evaluated by Dr. Aminta FARRIS one month ago for weakness, gait imbalance and LLE weakness and the patient was referred to Dr. Chand for further evaluation. She was not able to complete the outpatient workup due to new neuro deficits. Reports a fall trying to transfer from the chair to the toilet. She denies hitting her head/back or LOC. Reports since then she noticed worsening weakness RLE>LLE and some bowel incontinence. She feels the urge to have a BM but reports episodes of incontinence. Also notes urinary urgency x 2 weeks. Prakash placed in ED due to retention, bladder scan 660cc without the urge to void. She has nondermatomal patchy numbness in the BLE in the feet and thighs. Denies saddle anesthesia, UE weakness or numbness. NSGY consulted for evaluation.       Post-Op Info:  Procedure(s) (LRB):  FUSION, SPINE (N/A)   6 Days Post-Op     Interval History: NAEON. AFVSS. Labs wnl. Exam stable. Up in chair. Eager to continue therapy to improve mobility. HV and WV removed. Scheduled robaxin for LE spasms, added lidocaine patch for b/l hip pain. Pending discharge to [facility] once she has a post-op BM.    Medications:  Continuous Infusions:  Scheduled Meds:   gabapentin  300 mg Oral TID    heparin (porcine)  5,000 Units Subcutaneous Q8H    lactulose  20 g Oral BID    methocarbamoL  750 mg Oral QID    mupirocin   Nasal BID    polyethylene glycol  17 g Oral Daily    senna-docusate 8.6-50 mg  1 tablet Oral BID    silodosin  4 mg Oral Daily     PRN Meds:acetaminophen, bisacodyL, calcium gluconate IVPB, calcium gluconate IVPB, dextrose 10%, dextrose 10%,  famotidine, glucagon (human recombinant), glucose, glucose, glucose, hydrALAZINE, labetalol, LIDOcaine, melatonin, ondansetron, oxyCODONE, oxyCODONE     Review of Systems  Objective:     Weight: 68.5 kg (151 lb 0.2 oz)  Body mass index is 25.92 kg/m².  Vital Signs (Most Recent):  Temp: 97.8 °F (36.6 °C) (08/28/23 1132)  Pulse: 82 (08/28/23 1132)  Resp: 18 (08/28/23 1132)  BP: 112/62 (08/28/23 1132)  SpO2: 98 % (08/28/23 1132) Vital Signs (24h Range):  Temp:  [97.7 °F (36.5 °C)-99.1 °F (37.3 °C)] 97.8 °F (36.6 °C)  Pulse:  [75-98] 82  Resp:  [14-20] 18  SpO2:  [97 %-99 %] 98 %  BP: (112-134)/(60-64) 112/62                              Closed/Suction Drain 08/22/23 1827 Midline Back Accordion (Active)   Site Description Unable to view 08/28/23 0800   Dressing Type Transparent (Tegaderm) 08/28/23 0800   Dressing Status Clean;Dry;Intact 08/28/23 0800   Dressing Intervention Integrity maintained 08/28/23 0800   Drainage Serosanguineous 08/28/23 0800   Status Other (Comment) 08/27/23 1600   Output (mL) 30 mL 08/27/23 1900          Physical Exam     Neurosurgery Physical Exam    General: well developed, well nourished, no distress.   Head: normocephalic, atraumatic  Neurologic: Alert and oriented. Thought content appropriate.  GCS: Motor: 6/Verbal: 5/Eyes: 4 GCS Total: 15  Mental Status: Awake, Alert, Oriented x 4  Language: No aphasia  Speech: No dysarthria  Cranial nerves: face symmetric, tongue midline, CN II-XII grossly intact.   Eyes: pupils equal, round, reactive to light with accomodation, EOMI.   Pulmonary: normal respirations, no signs of respiratory distress  Abdomen: soft, non-distended, not tender to palpation  Sensory: intact to light touch throughout  Motor Strength: Moves all extremities spontaneously with good tone. No abnormal movements seen.     Strength  Deltoids Triceps Biceps Wrist Extension Wrist Flexion Hand    Upper: R 5/5 5/5 5/5 5/5 5/5 5/5    L 5/5 5/5 5/5 5/5 5/5 5/5     Iliopsoas Quadriceps  "Knee  Flexion Tibialis  anterior Gastro- cnemius EHL   Lower: R 4-/5 5/5 5/5 5/5 5/5 5/5    L 4-/5 4+/5 4-/5 3/5 4+/5 3/5     Vascular: No LE edema.   Skin: Skin is warm, dry and intact.    Incision c/d/I with skin edges well approximated with staples and sutures. No surrounding erythema or edema. No drainage from incision. No palpable hematoma or underlying fluid collection.      Significant Labs:  Recent Labs   Lab 08/27/23 0407 08/28/23  0253    94    137   K 5.0 4.5    102   CO2 26 28   BUN 12 11   CREATININE 0.6 0.6   CALCIUM 9.0 9.2   MG 1.9 1.9     Recent Labs   Lab 08/27/23 0407 08/28/23 0253   WBC 6.14 8.07   HGB 10.7* 10.5*   HCT 32.0* 31.3*    202     No results for input(s): "LABPT", "INR", "APTT" in the last 48 hours.  Microbiology Results (last 7 days)       ** No results found for the last 168 hours. **          Recent Lab Results         08/28/23 0253        Albumin 2.7       Alkaline Phosphatase 43       ALT 24       Anion Gap 7       AST 25       Baso # 0.03       Basophil % 0.4       BILIRUBIN TOTAL 0.6  Comment: For infants and newborns, interpretation of results should be based  on gestational age, weight and in agreement with clinical  observations.    Premature Infant recommended reference ranges:  Up to 24 hours.............<8.0 mg/dL  Up to 48 hours............<12.0 mg/dL  3-5 days..................<15.0 mg/dL  6-29 days.................<15.0 mg/dL         BUN 11       Calcium 9.2       Chloride 102       CO2 28       Creatinine 0.6       Differential Method Automated       eGFR >60.0       Eos # 0.1       Eosinophil % 1.4       Glucose 94       Gran # (ANC) 5.6       Gran % 69.5       Hematocrit 31.3       Hemoglobin 10.5       Immature Grans (Abs) 0.04  Comment: Mild elevation in immature granulocytes is non specific and   can be seen in a variety of conditions including stress response,   acute inflammation, trauma and pregnancy. Correlation with other "   laboratory and clinical findings is essential.         Immature Granulocytes 0.5       Lymph # 1.5       Lymph % 18.0       Magnesium 1.9       MCH 30.3       MCHC 33.5       MCV 90       Mono # 0.8       Mono % 10.2       MPV 9.6       nRBC 0       Phosphorus 3.9       Platelets 202       Potassium 4.5       PROTEIN TOTAL 5.5       RBC 3.47       RDW 13.3       Sodium 137       WBC 8.07             All pertinent labs from the last 24 hours have been reviewed.    Significant Diagnostics:  I have reviewed all pertinent imaging results/findings within the past 24 hours.    Assessment/Plan:     * Thoracic spinal stenosis  Emilia Pablo is a 78 y.o. female with PMH of osteoporosis- on Prolia, HTN, Abreu-Stickler Syndrome, worsening BLE weakness x 3 months who presents for evaluation of acute myelopathy.     - MRI cervical and thoracic spine shows moderate canal stenosis C4-5, T10-11 2/2 calcified disc herniation with cord signal change, multilevel foraminal stenosis.   - MRI lumbar spine with stable lumbar collection in subcutaneous tissues. No significant central stenosis. Prior MRI L spine 8/2/2023 limited evaluation 2/2 hardware artifact, but without severe canal stenosis.      S/p T10-11 discectomy and extension/revision of prior fusion to T8-pelvis.     - TTF, neuro exam stable  - HV and WV removed. Leave incision open to air.  - post op xrays reviewed, hardware in good position  - tolerating diet  - voiding spontaneously after meza removed  - H/H stable  - Continue current regimen for pain control   - SQH for DVT ppx  - PT/OT/TLSO when up/OOB    Dispo: IPR pending CORA Swain PA-C  Neurosurgery  [facility] ([facility])

## 2023-08-28 NOTE — PT/OT/SLP PROGRESS
Physical Therapy      Patient Name:  Emilia Pablo   MRN:  [id number]    Patient not seen today secondary to Toileting. Will follow-up 8/29.

## 2023-08-28 NOTE — SUBJECTIVE & OBJECTIVE
Past Medical History:   Diagnosis Date    Arthritis     osteoarthritis    Hypertension     Abreu-Stickler syndrome      Past Surgical History:   Procedure Laterality Date    acdf      2 level    BACK SURGERY      laminectomy    BACK SURGERY      lumbar fusion L1-S1    CARDIAC CATHETERIZATION  2016    Normal Dr Wright; see media note; normal     SECTION      x2    EYE SURGERY Bilateral     laser   -   Nano's     JOINT REPLACEMENT Left 2013    JOINT REPLACEMENT Right 5/11/15    SPINAL FUSION N/A 2023    Procedure: FUSION, SPINE;  Surgeon: Pedrito Chand MD;  Location: [facility] OR [address] [place] [address];  Service: Neurosurgery;  Laterality: N/A;  T8-pelvis extension and revision of fusion, T10-11 discectomy; neuromonitoring, depuy    TONSILLECTOMY       Review of patient's allergies indicates:  No Known Allergies    Scheduled Medications:    gabapentin  300 mg Oral TID    heparin (porcine)  5,000 Units Subcutaneous Q8H    lactulose  20 g Oral BID    methocarbamoL  750 mg Oral QID    mupirocin   Nasal BID    polyethylene glycol  17 g Oral Daily    senna-docusate 8.6-50 mg  1 tablet Oral BID    silodosin  4 mg Oral Daily       PRN Medications: acetaminophen, bisacodyL, calcium gluconate IVPB, calcium gluconate IVPB, dextrose 10%, dextrose 10%, famotidine, glucagon (human recombinant), glucose, glucose, glucose, hydrALAZINE, labetalol, LIDOcaine, melatonin, ondansetron, oxyCODONE, oxyCODONE    Family History       Problem Relation (Age of Onset)    Arthritis Mother    Cancer Sister    Crohn's disease Son    Diabetes Mother    Heart disease Mother, Brother, Sister    No Known Problems Son    Stickler syndrome Son          Tobacco Use    Smoking status: Never     Passive exposure: Never    Smokeless tobacco: Never   Substance and Sexual Activity    Alcohol use: Yes     Comment: occasional    Drug use: Not on file    Sexual activity: Not on file     Review of Systems   Constitutional:  Positive for activity  change. Negative for fatigue and fever.   HENT:  Negative for sore throat and trouble swallowing.    Eyes:  Negative for visual disturbance.   Respiratory:  Negative for cough and shortness of breath.    Cardiovascular:  Negative for chest pain and leg swelling.   Gastrointestinal:  Negative for abdominal distention and abdominal pain.   Genitourinary:  Negative for difficulty urinating.   Musculoskeletal:  Positive for gait problem. Negative for back pain.   Skin:  Negative for color change.   Neurological:  Positive for weakness. Negative for dizziness, light-headedness and headaches.   Psychiatric/Behavioral:  Negative for agitation and confusion.      Objective:     Vital Signs (Most Recent):  Temp: 97.8 °F (36.6 °C) (08/28/23 1541)  Pulse: 86 (08/28/23 1541)  Resp: 18 (08/28/23 1541)  BP: (!) 110/55 (08/28/23 1541)  SpO2: 99 % (08/28/23 1541)    Vital Signs (24h Range):  Temp:  [97.7 °F (36.5 °C)-98.6 °F (37 °C)] 97.8 °F (36.6 °C)  Pulse:  [75-98] 86  Resp:  [14-18] 18  SpO2:  [97 %-99 %] 99 %  BP: (110-134)/(55-64) 110/55     Body mass index is 25.92 kg/m².     Physical Exam  Vitals and nursing note reviewed.   Constitutional:       Appearance: Normal appearance. She is well-developed.   Eyes:      Pupils: Pupils are equal, round, and reactive to light.   Pulmonary:      Effort: Pulmonary effort is normal. No respiratory distress.   Abdominal:      General: Bowel sounds are normal.      Palpations: Abdomen is soft.   Musculoskeletal:      Cervical back: Normal range of motion and neck supple.      Comments: BLE weakness   Skin:     General: Skin is warm and dry.   Neurological:      Mental Status: She is alert and oriented to person, place, and time. Mental status is at baseline.   Psychiatric:         Mood and Affect: Mood normal.         Behavior: Behavior normal.          NEUROLOGICAL EXAMINATION:     MENTAL STATUS   Oriented to person, place, and time.     CRANIAL NERVES     CN III, IV, VI   Pupils are  equal, round, and reactive to light.      Diagnostic Results: Labs: Reviewed  ECG: Reviewed  CT: Reviewed

## 2023-08-28 NOTE — SUBJECTIVE & OBJECTIVE
Interval History: NAEON. AFVSS. Labs wnl. Exam stable. Up in chair. Eager to continue therapy to improve mobility. HV and WV removed. Scheduled robaxin for LE spasms, added lidocaine patch for b/l hip pain. Pending discharge to [facility] once she has a post-op BM.    Medications:  Continuous Infusions:  Scheduled Meds:   gabapentin  300 mg Oral TID    heparin (porcine)  5,000 Units Subcutaneous Q8H    lactulose  20 g Oral BID    methocarbamoL  750 mg Oral QID    mupirocin   Nasal BID    polyethylene glycol  17 g Oral Daily    senna-docusate 8.6-50 mg  1 tablet Oral BID    silodosin  4 mg Oral Daily     PRN Meds:acetaminophen, bisacodyL, calcium gluconate IVPB, calcium gluconate IVPB, dextrose 10%, dextrose 10%, famotidine, glucagon (human recombinant), glucose, glucose, glucose, hydrALAZINE, labetalol, LIDOcaine, melatonin, ondansetron, oxyCODONE, oxyCODONE     Review of Systems  Objective:     Weight: 68.5 kg (151 lb 0.2 oz)  Body mass index is 25.92 kg/m².  Vital Signs (Most Recent):  Temp: 97.8 °F (36.6 °C) (08/28/23 1132)  Pulse: 82 (08/28/23 1132)  Resp: 18 (08/28/23 1132)  BP: 112/62 (08/28/23 1132)  SpO2: 98 % (08/28/23 1132) Vital Signs (24h Range):  Temp:  [97.7 °F (36.5 °C)-99.1 °F (37.3 °C)] 97.8 °F (36.6 °C)  Pulse:  [75-98] 82  Resp:  [14-20] 18  SpO2:  [97 %-99 %] 98 %  BP: (112-134)/(60-64) 112/62                              Closed/Suction Drain 08/22/23 1827 Midline Back Accordion (Active)   Site Description Unable to view 08/28/23 0800   Dressing Type Transparent (Tegaderm) 08/28/23 0800   Dressing Status Clean;Dry;Intact 08/28/23 0800   Dressing Intervention Integrity maintained 08/28/23 0800   Drainage Serosanguineous 08/28/23 0800   Status Other (Comment) 08/27/23 1600   Output (mL) 30 mL 08/27/23 1900          Physical Exam     Neurosurgery Physical Exam    General: well developed, well nourished, no distress.   Head: normocephalic, atraumatic  Neurologic: Alert and oriented. Thought content  "appropriate.  GCS: Motor: 6/Verbal: 5/Eyes: 4 GCS Total: 15  Mental Status: Awake, Alert, Oriented x 4  Language: No aphasia  Speech: No dysarthria  Cranial nerves: face symmetric, tongue midline, CN II-XII grossly intact.   Eyes: pupils equal, round, reactive to light with accomodation, EOMI.   Pulmonary: normal respirations, no signs of respiratory distress  Abdomen: soft, non-distended, not tender to palpation  Sensory: intact to light touch throughout  Motor Strength: Moves all extremities spontaneously with good tone. No abnormal movements seen.     Strength  Deltoids Triceps Biceps Wrist Extension Wrist Flexion Hand    Upper: R 5/5 5/5 5/5 5/5 5/5 5/5    L 5/5 5/5 5/5 5/5 5/5 5/5     Iliopsoas Quadriceps Knee  Flexion Tibialis  anterior Gastro- cnemius EHL   Lower: R 4-/5 5/5 5/5 5/5 5/5 5/5    L 4-/5 4+/5 4-/5 3/5 4+/5 3/5     Vascular: No LE edema.   Skin: Skin is warm, dry and intact.    Incision c/d/I with skin edges well approximated with staples and sutures. No surrounding erythema or edema. No drainage from incision. No palpable hematoma or underlying fluid collection.      Significant Labs:  Recent Labs   Lab 08/27/23  0407 08/28/23  0253    94    137   K 5.0 4.5    102   CO2 26 28   BUN 12 11   CREATININE 0.6 0.6   CALCIUM 9.0 9.2   MG 1.9 1.9     Recent Labs   Lab 08/27/23  0407 08/28/23  0253   WBC 6.14 8.07   HGB 10.7* 10.5*   HCT 32.0* 31.3*    202     No results for input(s): "LABPT", "INR", "APTT" in the last 48 hours.  Microbiology Results (last 7 days)       ** No results found for the last 168 hours. **          Recent Lab Results         08/28/23 0253        Albumin 2.7       Alkaline Phosphatase 43       ALT 24       Anion Gap 7       AST 25       Baso # 0.03       Basophil % 0.4       BILIRUBIN TOTAL 0.6  Comment: For infants and newborns, interpretation of results should be based  on gestational age, weight and in agreement with " clinical  observations.    Premature Infant recommended reference ranges:  Up to 24 hours.............<8.0 mg/dL  Up to 48 hours............<12.0 mg/dL  3-5 days..................<15.0 mg/dL  6-29 days.................<15.0 mg/dL         BUN 11       Calcium 9.2       Chloride 102       CO2 28       Creatinine 0.6       Differential Method Automated       eGFR >60.0       Eos # 0.1       Eosinophil % 1.4       Glucose 94       Gran # (ANC) 5.6       Gran % 69.5       Hematocrit 31.3       Hemoglobin 10.5       Immature Grans (Abs) 0.04  Comment: Mild elevation in immature granulocytes is non specific and   can be seen in a variety of conditions including stress response,   acute inflammation, trauma and pregnancy. Correlation with other   laboratory and clinical findings is essential.         Immature Granulocytes 0.5       Lymph # 1.5       Lymph % 18.0       Magnesium 1.9       MCH 30.3       MCHC 33.5       MCV 90       Mono # 0.8       Mono % 10.2       MPV 9.6       nRBC 0       Phosphorus 3.9       Platelets 202       Potassium 4.5       PROTEIN TOTAL 5.5       RBC 3.47       RDW 13.3       Sodium 137       WBC 8.07             All pertinent labs from the last 24 hours have been reviewed.    Significant Diagnostics:  I have reviewed all pertinent imaging results/findings within the past 24 hours.

## 2023-08-28 NOTE — PLAN OF CARE
Problem: Infection  Goal: Absence of Infection Signs and Symptoms  Outcome: Ongoing, Progressing     Problem: Adult Inpatient Plan of Care  Goal: Plan of Care Review  Outcome: Ongoing, Progressing  Goal: Patient-Specific Goal (Individualized)  Outcome: Ongoing, Progressing  Goal: Absence of Hospital-Acquired Illness or Injury  Outcome: Ongoing, Progressing  Goal: Optimal Comfort and Wellbeing  Outcome: Ongoing, Progressing  Goal: Readiness for Transition of Care  Outcome: Ongoing, Progressing     POC reviewed and updated with the patient at the bedside. Questions regarding POC were encouraged and addressed. VSS, see flowsheets. Tele maintained. Patient is AOx 4 at this time. Fall and safety precautions maintained, no signs of injury noted during shift. 1 hemovacs to gravity and woundvac in place, see flowsheets for output. Pain management utilizing PRN pain medications effective, see MAR for administration details.LSO brace to be worn OOB. Patient was repositioned for comfort with bed locked in low position, side rails up x 3, bed alarm set, with call light within reach. Instructed patient to call staff for mobility, verbalized understanding. No acute signs of distress noted at this time.

## 2023-08-28 NOTE — CONSULTS
Inpatient consult to Physical Medicine Rehab  Consult performed by: Frances Scott NP  Consult ordered by: Pedrito Chand MD  Reason for consult: Assess rehab needs      Reviewed patient history and current admission.  Rehab team following.  Full consult to follow.    SO Roldan, FNP-C  Physical Medicine & Rehabilitation   08/28/2023

## 2023-08-28 NOTE — NURSING
Patient discharged to [facility]    Patient visualized leaving floor. Telemetry Dc'd. IV Dc'd. VSS. Aox4.

## 2023-08-28 NOTE — PLAN OF CARE
[facility] (Hospital)  Discharge Final Note    Primary Care Provider: Dave Gibson MD    Expected Discharge Date: 8/28/2023    Patient to be discharged to [facility].  Care deferred to [facility].  Rosalino Membreno notified of discharge to Rehab. [facility] to provide transportation.  Neurosurgery clinic to schedule follow up appointment.    Nurse to call report to [phone] or [phone].  Wheelchair requested for 4:30 which is not a guaranteed arrival time.    Future Appointments   Date Time Provider Department Center   12/20/2023 10:30 AM Dave Gibson MD [facility]        Final Discharge Note (most recent)       Final Note - 08/28/23 1555          Final Note    Assessment Type Final Discharge Note     Anticipated Discharge Disposition Rehab Facility        Post-Acute Status    Post-Acute Authorization Placement     Post-Acute Placement Status Set-up Complete/Auth obtained     Discharge Delays None known at this time                     Important Message from Medicare

## 2023-08-28 NOTE — CONSULTS
[facility] ([facility])  Physical Medicine & Rehab  Consult Note    Patient Name: Emilia Pablo  MRN: [id number]  Admission Date: 2023  Hospital Length of Stay: 7 days  Attending Physician: Pedrito Chand MD     Inpatient consult to Physical Medicine & Rehabilitation  Consult performed by: Farnces Scott NP  Consult requested by:  Pedrito Chand MD    Collaborating Physician: Ruth Sprague MD  Reason for Consult:  Assess rehabilitation needs     Consults  Subjective:     Principal Problem: Thoracic spinal stenosis    HPI: Emilia Pablo is a 78-year-old femal with PMHx of osteoporosis- on Prolia, HTN, and Abreu-Stickler Syndrome,  cervical fusion and  lumbosacral fusion, and chronic foot drop on left since surgery. Patient presented to [facility] on 23 for BLE weakness x 1 month. MRI C/T/L spine demonstrating moderate canal stenosis T10-T11 2/2 calcified disc herniation with cord signal change now s/p T10-T11 discectomy with T8-pelvis extension and revision of prior fusion.     Functional History: Patient lives with son who is has vision impairment and unable to drive in a single story home with 1 step to enter.  Prior to admission, [facility]. DME: standard walker nd w/c if needed.       Hospital Course: 23: participated w/ OT. Pt able to take 2 steps forward and back with Max of 2, RW to promote functional mobility.      Past Medical History:   Diagnosis Date    Arthritis     osteoarthritis    Hypertension     Abreu-Stickler syndrome      Past Surgical History:   Procedure Laterality Date    acdf      2 level    BACK SURGERY      laminectomy    BACK SURGERY      lumbar fusion L1-S1    CARDIAC CATHETERIZATION  2016    Normal Dr Wright; see media note; normal     SECTION      x2    EYE SURGERY Bilateral     laser   -   Nano's     JOINT REPLACEMENT Left 2013    JOINT REPLACEMENT Right 5/11/15    SPINAL FUSION N/A 2023    Procedure: FUSION,  SPINE;  Surgeon: Pedrito Chand MD;  Location: [facility] OR [address] [place] [address];  Service: Neurosurgery;  Laterality: N/A;  T8-pelvis extension and revision of fusion, T10-11 discectomy; neuromonitoring, depuy    TONSILLECTOMY       Review of patient's allergies indicates:  No Known Allergies    Scheduled Medications:    gabapentin  300 mg Oral TID    heparin (porcine)  5,000 Units Subcutaneous Q8H    lactulose  20 g Oral BID    methocarbamoL  750 mg Oral QID    mupirocin   Nasal BID    polyethylene glycol  17 g Oral Daily    senna-docusate 8.6-50 mg  1 tablet Oral BID    silodosin  4 mg Oral Daily       PRN Medications: acetaminophen, bisacodyL, calcium gluconate IVPB, calcium gluconate IVPB, dextrose 10%, dextrose 10%, famotidine, glucagon (human recombinant), glucose, glucose, glucose, hydrALAZINE, labetalol, LIDOcaine, melatonin, ondansetron, oxyCODONE, oxyCODONE    Family History       Problem Relation (Age of Onset)    Arthritis Mother    Cancer Sister    Crohn's disease Son    Diabetes Mother    Heart disease Mother, Brother, Sister    No Known Problems Son    Stickler syndrome Son          Tobacco Use    Smoking status: Never     Passive exposure: Never    Smokeless tobacco: Never   Substance and Sexual Activity    Alcohol use: Yes     Comment: occasional    Drug use: Not on file    Sexual activity: Not on file     Review of Systems   Constitutional:  Positive for activity change. Negative for fatigue and fever.   HENT:  Negative for sore throat and trouble swallowing.    Eyes:  Negative for visual disturbance.   Respiratory:  Negative for cough and shortness of breath.    Cardiovascular:  Negative for chest pain and leg swelling.   Gastrointestinal:  Negative for abdominal distention and abdominal pain.   Genitourinary:  Negative for difficulty urinating.   Musculoskeletal:  Positive for gait problem. Negative for back pain.   Skin:  Negative for color change.   Neurological:  Positive for weakness. Negative  for dizziness, light-headedness and headaches.   Psychiatric/Behavioral:  Negative for agitation and confusion.      Objective:     Vital Signs (Most Recent):  Temp: 97.8 °F (36.6 °C) (08/28/23 1541)  Pulse: 86 (08/28/23 1541)  Resp: 18 (08/28/23 1541)  BP: (!) 110/55 (08/28/23 1541)  SpO2: 99 % (08/28/23 1541)    Vital Signs (24h Range):  Temp:  [97.7 °F (36.5 °C)-98.6 °F (37 °C)] 97.8 °F (36.6 °C)  Pulse:  [75-98] 86  Resp:  [14-18] 18  SpO2:  [97 %-99 %] 99 %  BP: (110-134)/(55-64) 110/55     Body mass index is 25.92 kg/m².     Physical Exam  Vitals and nursing note reviewed.   Constitutional:       Appearance: Normal appearance. She is well-developed.   Eyes:      Pupils: Pupils are equal, round, and reactive to light.   Pulmonary:      Effort: Pulmonary effort is normal. No respiratory distress.   Abdominal:      General: Bowel sounds are normal.      Palpations: Abdomen is soft.   Musculoskeletal:      Cervical back: Normal range of motion and neck supple.      Comments: BLE weakness   Skin:     General: Skin is warm and dry.   Neurological:      Mental Status: She is alert and oriented to person, place, and time. Mental status is at baseline.   Psychiatric:         Mood and Affect: Mood normal.         Behavior: Behavior normal.     Diagnostic Results:   Labs: Reviewed  ECG: Reviewed  CT: Reviewed    Assessment/Plan:     * Thoracic spinal stenosis  - Related to prolonged/acute hospital course.     Recommendations  -  Encourage mobility, OOB in chair at least 3 hours per day, and early ambulation as appropriate  -  PT/OT evaluate and treat  -  Pain management  -  Monitor for and prevent skin breakdown and pressure ulcers  · Early mobility, repositioning/weight shifting every 20-30 minutes when sitting, turn patient every 2 hours, proper mattress/overlay and chair cushioning, pressure relief/heel protector boots  -  DVT prophylaxis    -  Reviewed discharge options (IP rehab, SNF, HH therapy, and OP  therapy)    Hypertension  - stable    PM&R Recommendation:     At this time, the PM&R team has reviewed this patient's ongoing medical case including inpatient diagnosis, medical history, clinical examination, labs, vitals, current social and functional history to provide the post-acute recommendation as follows:     RECOMMENDATIONS: inpatient rehabilitation due to good motivation/participation with therapies, has been determined to tolerate 3 hours of therapy and good potential for recovery.     The patient will be admitted for comprehensive interdisciplinary inpatient rehabilitation to address the impairments due to medical diagnosis of Thoracic Spinal Stenosis. The patient will benefit from an inpatient rehabilitation program to promote functional recovery, implement compensatory strategies and will undergo assessment for needs for durable medical equipment for safe discharge to the community. This patient will benefit from a coordinated interdisciplinary rehabilitation program services that require close monitoring and treatment with 24-hour rehabilitative nursing and physical/occupational therapies for 3 hours/day for 5 days/week.This interdisciplinary program will be performed under the direction of a physiatrist.    MEDICAL STABILITY:     At this time, no barriers for post-acute rehab admission.    We will continue to follow.    Thank you for your consult.     Frances Scott NP  Department of Physical Medicine & Rehab  [facility])

## 2023-08-29 ENCOUNTER — TELEPHONE (OUTPATIENT)
Dept: NEUROSURGERY | Facility: CLINIC | Age: 78
End: 2023-08-29
Payer: MEDICARE

## 2023-08-29 NOTE — TELEPHONE ENCOUNTER
P/c to pt. L./m on v.m regarding 6 wk post op appt.with xray  Future Appointments   Date Time Provider Department Center   10/5/2023 10:45 AM [facility]   10/5/2023 11:30 AM Pedrito Chand MD [facility] [address]   12/20/2023 10:30 AM Dave Gibson MD [facility]

## 2023-08-29 NOTE — TELEPHONE ENCOUNTER
----- Message from Rashida Swain PA-C sent at 8/28/2023  2:25 PM CDT -----  Can you schedule her for follow-up in 6 weeks with XR thoracolumbar spine? XR is ordered. She is s/p T8-L2 revision fusion on 8/22. She is going to rehab so staples and sutures will come out there.     Thanks!

## 2023-09-07 ENCOUNTER — TELEPHONE (OUTPATIENT)
Dept: FAMILY MEDICINE | Facility: CLINIC | Age: 78
End: 2023-09-07
Payer: MEDICARE

## 2023-09-14 ENCOUNTER — TELEPHONE (OUTPATIENT)
Dept: FAMILY MEDICINE | Facility: CLINIC | Age: 78
End: 2023-09-14
Payer: MEDICARE

## 2023-09-14 NOTE — TELEPHONE ENCOUNTER
Unable to reach regarding hospital f/u. Pt is scheduled, need to confirm date and time.     LVM to contact clinic to confirm.    Patient scheduled 09/27.

## 2023-09-14 NOTE — TELEPHONE ENCOUNTER
----- Message from Rowan Pena sent at 9/14/2023  2:24 PM CDT -----  .Type:  Sooner Apoointment Request    Caller is requesting a sooner appointment.  Caller declined first available appointment listed below.  Caller will not accept being placed on the waitlist and is requesting a message be sent to doctor.  Name of Caller:  When is the first available appointment?  Symptoms:follow up  Would the patient rather a call back or a response via [facility]? Call back  Best Call Back Number:[phone]  Additional Information:      Pt will be discharging 9/18/23 and need a follow up appointment

## 2023-09-19 PROCEDURE — G0180 PR HOME HEALTH MD CERTIFICATION: ICD-10-PCS | Mod: ,,, | Performed by: STUDENT IN AN ORGANIZED HEALTH CARE EDUCATION/TRAINING PROGRAM

## 2023-09-19 PROCEDURE — G0180 MD CERTIFICATION HHA PATIENT: HCPCS | Mod: ,,, | Performed by: STUDENT IN AN ORGANIZED HEALTH CARE EDUCATION/TRAINING PROGRAM

## 2023-09-25 ENCOUNTER — TELEPHONE (OUTPATIENT)
Dept: FAMILY MEDICINE | Facility: CLINIC | Age: 78
End: 2023-09-25
Payer: MEDICARE

## 2023-09-25 NOTE — TELEPHONE ENCOUNTER
----- Message from Mirella Shah sent at 9/25/2023  9:39 AM CDT -----  Type Appointment question   Who Called: pt  Would the patient rather a call back or a response via [facility]? call  Best Call Back Number: [phone]  Additional Information:  patient would like to know if she can use emergency back entrance for upcoming appointment , patient states she has trouble getting into building

## 2023-09-27 ENCOUNTER — OFFICE VISIT (OUTPATIENT)
Dept: FAMILY MEDICINE | Facility: CLINIC | Age: 78
End: 2023-09-27
Payer: MEDICARE

## 2023-09-27 VITALS
WEIGHT: 147 LBS | TEMPERATURE: 98 F | DIASTOLIC BLOOD PRESSURE: 66 MMHG | HEIGHT: 64 IN | SYSTOLIC BLOOD PRESSURE: 138 MMHG | OXYGEN SATURATION: 98 % | HEART RATE: 72 BPM | BODY MASS INDEX: 25.1 KG/M2

## 2023-09-27 DIAGNOSIS — I70.0 AORTIC ATHEROSCLEROSIS: ICD-10-CM

## 2023-09-27 DIAGNOSIS — D64.9 ANEMIA, UNSPECIFIED TYPE: ICD-10-CM

## 2023-09-27 DIAGNOSIS — M81.0 OSTEOPOROSIS OF MULTIPLE SITES: ICD-10-CM

## 2023-09-27 DIAGNOSIS — M15.9 PRIMARY OSTEOARTHRITIS INVOLVING MULTIPLE JOINTS: Primary | ICD-10-CM

## 2023-09-27 DIAGNOSIS — I10 ESSENTIAL HYPERTENSION: ICD-10-CM

## 2023-09-27 PROCEDURE — G0008 FLU VACCINE - QUADRIVALENT - ADJUVANTED: ICD-10-PCS | Mod: S$GLB,,, | Performed by: FAMILY MEDICINE

## 2023-09-27 PROCEDURE — 90694 FLU VACCINE - QUADRIVALENT - ADJUVANTED: ICD-10-PCS | Mod: S$GLB,,, | Performed by: FAMILY MEDICINE

## 2023-09-27 PROCEDURE — G0008 ADMIN INFLUENZA VIRUS VAC: HCPCS | Mod: S$GLB,,, | Performed by: FAMILY MEDICINE

## 2023-09-27 PROCEDURE — 99214 OFFICE O/P EST MOD 30 MIN: CPT | Mod: S$GLB,,, | Performed by: FAMILY MEDICINE

## 2023-09-27 PROCEDURE — 90694 VACC AIIV4 NO PRSRV 0.5ML IM: CPT | Mod: S$GLB,,, | Performed by: FAMILY MEDICINE

## 2023-09-27 PROCEDURE — 99214 PR OFFICE/OUTPT VISIT, EST, LEVL IV, 30-39 MIN: ICD-10-PCS | Mod: S$GLB,,, | Performed by: FAMILY MEDICINE

## 2023-09-27 NOTE — PROGRESS NOTES
"Subjective:      Patient ID: Emilia Pablo is a 78 y.o. female.    Chief Complaint: Follow-up (Hospital follow up)      Vitals:    09/27/23 0938   BP: 138/66   Pulse: 72   Temp: 98.3 °F (36.8 °C)   SpO2: 98%   Weight: 66.7 kg (147 lb)   Height: 5' 4" (1.626 m)        HPI   Had spinal surgery in , here for follow up at [facility], T and L spine  Had previous surgery 15 years ago  Walks with a walker, PT at house,   Went to rehab post op at [facility]  Had transfusion and hypotension  Labs from one week ago corbyiewed, anemic,   Problem List  Patient Active Problem List   Diagnosis    Neck pain    Hypertension    Primary insomnia    Chronic mixed headache syndrome    Chronic pain of both shoulders    Arthritis    Age-related osteoporosis without current pathological fracture    Abreu-Stickler syndrome    Thoracic spinal stenosis    Urinary retention    Acute blood loss anemia    Hypocalcemia    Aortic atherosclerosis    Anemia        ALLERGIES: Review of patient's allergies indicates:  No Known Allergies    MEDS:   Current Outpatient Medications:     acetaminophen (TYLENOL) 650 MG TbSR, Take 650 mg by mouth every 8 (eight) hours as needed (pain)., Disp: , Rfl:     b complex vitamins capsule, Take 1 capsule by mouth once daily., Disp: , Rfl:     denosumab (PROLIA) 60 mg/mL Syrg, Inject 60 mg into the skin every 6 (six) months., Disp: , Rfl:     famotidine (PEPCID ORAL), Take 1 tablet by mouth daily as needed (heartburn)., Disp: , Rfl:     fish oil-omega-3 fatty acids 300-1,000 mg capsule, Take 2 g by mouth once daily., Disp: , Rfl:     gabapentin (NEURONTIN) 300 MG capsule, 2 nightly and one during day as needed for nerve pain (Patient taking differently: Take 300 mg by mouth every evening.), Disp: 270 capsule, Rfl: 3    glucosamine-chondroitin 500-400 mg tablet, Take 1 tablet by mouth 2 (two) times daily., Disp: , Rfl:     LIDOcaine (LIDODERM) 5 %, Place 2 patches onto the skin daily as needed (pain). Remove & Discard " patch within 12 hours or as directed by MD  For use while admitted to rehab, Disp: , Rfl: 0    loratadine (CLARITIN) 10 mg tablet, Take 10 mg by mouth daily as needed for Allergies., Disp: , Rfl:     melatonin (MELATIN) 3 mg tablet, Take 2 tablets (6 mg total) by mouth nightly as needed for Insomnia., Disp: , Rfl: 0    olmesartan (BENICAR) 5 MG Tab, TAKE 1 TABLET EVERY DAY, Disp: 90 tablet, Rfl: 3    oxyCODONE (ROXICODONE) 10 mg Tab immediate release tablet, Take 1 tablet (10 mg total) by mouth every 6 (six) hours as needed for Pain., Disp: , Rfl: 0    oxymetazoline (AFRIN) 0.05 % nasal spray, 2 sprays by Nasal route daily as needed for Congestion., Disp: , Rfl:     vitamin D 1000 units Tab, Take 2,000 Units by mouth once daily., Disp: , Rfl:     heparin sodium,porcine (HEPARIN, PORCINE,) 5,000 unit/mL injection, Inject 1 mL (5,000 Units total) into the skin every 8 (eight) hours., Disp: , Rfl:     silodosin (RAPAFLO) 4 mg Cap capsule, Take 1 capsule (4 mg total) by mouth once daily. for 7 days, Disp: 7 capsule, Rfl: 0      History:  Current Providers as of 9/27/2023  PCP: Dave Gibson MD  Care Team Provider: Ayush Juan MD  Care Team Provider: Marquis Aviles MD  Care Team Provider: Daniel Wright MD  Care Team Provider: Lionel Gandhi MD  Care Team Provider: Vianey Schrader MD  Care Team Provider: Lesa Bowling LPN  Care Team Provider: Pedrito Chand MD  Encounter Provider: Dave Gibson MD, starting on Wed Sep 27, 2023 12:00 AM  Referring Provider: not found, starting on Wed Sep 27, 2023 12:00 AM  Consulting Physician: Dave Gibson MD, starting on Wed Sep 27, 2023  9:24 AM (Active)   Past Medical History:   Diagnosis Date    Arthritis     osteoarthritis    Hypertension     Abreu-Stickler syndrome      Past Surgical History:   Procedure Laterality Date    acdf  2001    2 level    BACK SURGERY  1997    laminectomy    BACK SURGERY  2008    lumbar fusion L1-S1    CARDIAC  CATHETERIZATION  2016    Normal Dr Wright; see media note; normal     SECTION      x2    EYE SURGERY Bilateral     laser   -   Nano's     JOINT REPLACEMENT Left 2013    JOINT REPLACEMENT Right 5/11/15    SPINAL FUSION N/A 2023    Procedure: FUSION, SPINE;  Surgeon: Pedrito Chand MD;  Location: [facility] OR [address] [place] [address];  Service: Neurosurgery;  Laterality: N/A;  T8-pelvis extension and revision of fusion, T10-11 discectomy; neuromonitoring, depuy    TONSILLECTOMY       Social History     Tobacco Use    Smoking status: Never     Passive exposure: Never    Smokeless tobacco: Never   Substance Use Topics    Alcohol use: Yes     Comment: occasional         Review of Systems   Constitutional: Negative.    HENT: Negative.     Respiratory: Negative.     Cardiovascular: Negative.    Gastrointestinal: Negative.    Endocrine: Negative.    Genitourinary: Negative.    Musculoskeletal: Negative.    Skin:  Positive for wound.   Psychiatric/Behavioral: Negative.     All other systems reviewed and are negative.    Objective:     Physical Exam  Vitals and nursing note reviewed.   Constitutional:       Appearance: She is well-developed.   HENT:      Head: Normocephalic.   Eyes:      Conjunctiva/sclera: Conjunctivae normal.      Pupils: Pupils are equal, round, and reactive to light.   Cardiovascular:      Rate and Rhythm: Normal rate and regular rhythm.      Heart sounds: Normal heart sounds.   Pulmonary:      Effort: Pulmonary effort is normal.      Breath sounds: Normal breath sounds.   Musculoskeletal:         General: Normal range of motion.      Cervical back: Normal range of motion and neck supple.   Skin:     General: Skin is warm and dry.   Neurological:      Mental Status: She is alert and oriented to person, place, and time.      Deep Tendon Reflexes: Reflexes are normal and symmetric.   Psychiatric:         Behavior: Behavior normal.         Thought Content: Thought content normal.         Judgment: Judgment  normal.             Assessment:     1. Primary osteoarthritis involving multiple joints    2. Aortic atherosclerosis    3. Essential hypertension    4. Osteoporosis of multiple sites    5. Anemia, unspecified type      Plan:        Medication List            Accurate as of September 27, 2023 11:59 PM. If you have any questions, ask your nurse or doctor.                CHANGE how you take these medications      gabapentin 300 MG capsule  Commonly known as: NEURONTIN  2 nightly and one during day as needed for nerve pain  What changed:   how much to take  how to take this  when to take this  additional instructions            CONTINUE taking these medications      acetaminophen 650 MG Tbsr  Commonly known as: TYLENOL     b complex vitamins capsule     fish oil-omega-3 fatty acids 300-1,000 mg capsule     glucosamine-chondroitin 500-400 mg tablet     heparin (porcine) 5,000 unit/mL injection  Inject 1 mL (5,000 Units total) into the skin every 8 (eight) hours.     LIDOcaine 5 %  Commonly known as: LIDODERM  Place 2 patches onto the skin daily as needed (pain). Remove & Discard patch within 12 hours or as directed by MD    For use while admitted to rehab     loratadine 10 mg tablet  Commonly known as: CLARITIN     melatonin 3 mg tablet  Commonly known as: MELATIN  Take 2 tablets (6 mg total) by mouth nightly as needed for Insomnia.     olmesartan 5 MG Tab  Commonly known as: BENICAR  TAKE 1 TABLET EVERY DAY     oxyCODONE 10 mg Tab immediate release tablet  Commonly known as: ROXICODONE  Take 1 tablet (10 mg total) by mouth every 6 (six) hours as needed for Pain.     oxymetazoline 0.05 % nasal spray  Commonly known as: AFRIN     PEPCID ORAL     PROLIA 60 mg/mL Syrg  Generic drug: denosumab     silodosin 4 mg Cap capsule  Commonly known as: RAPAFLO  Take 1 capsule (4 mg total) by mouth once daily. for 7 days     vitamin D 1000 units Tab  Commonly known as: VITAMIN D3            STOP taking these medications      bisacodyL  10 mg Supp  Commonly known as: DULCOLAX  Stopped by: Dave Gibson MD     polyethylene glycol 17 gram Pwpk  Commonly known as: GLYCOLAX  Stopped by: Dave Gibson MD     senna-docusate 8.6-50 mg 8.6-50 mg per tablet  Commonly known as: PERICOLACE  Stopped by: Dave Gibson MD            Primary osteoarthritis involving multiple joints    Aortic atherosclerosis    Essential hypertension    Osteoporosis of multiple sites    Anemia, unspecified type    Other orders  -     Influenza (FLUAD) - Quadrivalent (Adjuvanted) *Preferred* (65+) (PF)      Liq neleft thigh lesion

## 2023-10-05 ENCOUNTER — HOSPITAL ENCOUNTER (OUTPATIENT)
Dept: RADIOLOGY | Facility: HOSPITAL | Age: 78
Discharge: HOME | End: 2023-10-05
Attending: PHYSICIAN ASSISTANT
Payer: MEDICARE

## 2023-10-05 ENCOUNTER — OFFICE VISIT (OUTPATIENT)
Dept: NEUROSURGERY | Facility: CLINIC | Age: 78
End: 2023-10-05
Payer: MEDICARE

## 2023-10-05 VITALS
HEART RATE: 66 BPM | HEIGHT: 64 IN | SYSTOLIC BLOOD PRESSURE: 143 MMHG | TEMPERATURE: 98 F | WEIGHT: 141 LBS | BODY MASS INDEX: 24.07 KG/M2 | DIASTOLIC BLOOD PRESSURE: 66 MMHG

## 2023-10-05 DIAGNOSIS — M48.04 THORACIC STENOSIS: ICD-10-CM

## 2023-10-05 DIAGNOSIS — M48.07 SPINAL STENOSIS, LUMBOSACRAL REGION: ICD-10-CM

## 2023-10-05 DIAGNOSIS — M41.9 SCOLIOSIS, UNSPECIFIED SCOLIOSIS TYPE, UNSPECIFIED SPINAL REGION: Primary | ICD-10-CM

## 2023-10-05 DIAGNOSIS — Z98.1 S/P FUSION OF THORACIC SPINE: ICD-10-CM

## 2023-10-05 PROCEDURE — 99024 PR POST-OP FOLLOW-UP VISIT: ICD-10-PCS | Mod: POP,,, | Performed by: NURSE PRACTITIONER

## 2023-10-05 PROCEDURE — 99999 PR PBB SHADOW E&M-EST. PATIENT-LVL V: ICD-10-PCS | Mod: PBBFAC,,, | Performed by: NURSE PRACTITIONER

## 2023-10-05 PROCEDURE — 99024 POSTOP FOLLOW-UP VISIT: CPT | Mod: POP,,, | Performed by: NURSE PRACTITIONER

## 2023-10-05 PROCEDURE — 99999 PR PBB SHADOW E&M-EST. PATIENT-LVL V: CPT | Mod: PBBFAC,,, | Performed by: NURSE PRACTITIONER

## 2023-10-05 PROCEDURE — 72080 X-RAY EXAM THORACOLMB 2/> VW: CPT | Mod: 26,,, | Performed by: RADIOLOGY

## 2023-10-05 PROCEDURE — 72080 XR THORACOLUMBAR SPINE AP LATERAL: ICD-10-PCS | Mod: 26,,, | Performed by: RADIOLOGY

## 2023-10-05 PROCEDURE — 99215 OFFICE O/P EST HI 40 MIN: CPT | Mod: PBBFAC | Performed by: NURSE PRACTITIONER

## 2023-10-05 PROCEDURE — 72080 X-RAY EXAM THORACOLMB 2/> VW: CPT | Mod: TC

## 2023-10-05 RX ORDER — BACLOFEN 5 MG/1
5 TABLET ORAL 3 TIMES DAILY
COMMUNITY
Start: 2023-09-15 | End: 2023-12-20

## 2023-10-05 RX ORDER — CELECOXIB 200 MG/1
200 CAPSULE ORAL
COMMUNITY
End: 2023-12-20 | Stop reason: SDUPTHER

## 2023-10-05 RX ORDER — METHOCARBAMOL 500 MG/1
500 TABLET, FILM COATED ORAL 4 TIMES DAILY
Qty: 40 TABLET | Refills: 0 | Status: SHIPPED | OUTPATIENT
Start: 2023-10-05 | End: 2023-10-23

## 2023-10-05 RX ORDER — OXYCODONE HYDROCHLORIDE 5 MG/1
5 TABLET ORAL EVERY 6 HOURS PRN
COMMUNITY
Start: 2023-09-15 | End: 2023-12-20

## 2023-10-05 NOTE — PROGRESS NOTES
HPI:  Emilia Pablo is a 78 y.o.  female with a PMHx of HTN, lumbar fusion L1-S1, laminectomy, spinal fusion, and bilateral joint replacements. She is being seen in clinic today with myself and Dr. Chand for her 6 week post-op evaluation. She is s/p revision and extension of posterior spinal fusion, T8 to L2, Rene osteotomy, T9, T10, T11 and T12 on 23 with Dr. Chand. States that she is doing well since surgery. Denies new neurological complaints.  Walks with a walker, PT at house, Went to rehab post op at [facility].     Review of patient's allergies indicates:  No Known Allergies    Past Medical History:   Diagnosis Date    Arthritis     osteoarthritis    Hypertension     Abreu-Stickler syndrome      Past Surgical History:   Procedure Laterality Date    acdf      2 level    BACK SURGERY      laminectomy    BACK SURGERY      lumbar fusion L1-S1    CARDIAC CATHETERIZATION  2016    Normal Dr Wright; see media note; normal     SECTION      x2    EYE SURGERY Bilateral     laser   -   Nano's     JOINT REPLACEMENT Left 2013    JOINT REPLACEMENT Right 5/11/15    SPINAL FUSION N/A 2023    Procedure: FUSION, SPINE;  Surgeon: Pedrito Chand MD;  Location: [facility] OR [address] [place] [address];  Service: Neurosurgery;  Laterality: N/A;  T8-pelvis extension and revision of fusion, T10-11 discectomy; neuromonitoring, depuy    TONSILLECTOMY       Family History   Problem Relation Age of Onset    Crohn's disease Son     Stickler syndrome Son     Diabetes Mother     Heart disease Mother     Arthritis Mother     Cancer Sister         melanoma    Heart disease Brother     Heart disease Sister     No Known Problems Son      Social History     Tobacco Use    Smoking status: Never     Passive exposure: Never    Smokeless tobacco: Never   Substance Use Topics    Alcohol use: Yes     Comment: occasional        Review of Systems    OBJECTIVE:   General: well developed, well nourished, no distress.   Head:  normocephalic, atraumatic  Neurologic: Alert and oriented. Thought content appropriate.  GCS: Motor: 6/Verbal: 5/Eyes: 4 GCS Total: 15  Mental Status: Awake, Alert, Oriented x 4  Language: No aphasia  Speech: No dysarthria  Cranial nerves: face symmetric, tongue midline, CN II-XII grossly intact.   Eyes: pupils equal, round, reactive to light with accomodation, EOMI.   Pulmonary: normal respirations, no signs of respiratory distress  Abdomen: soft, non-distended  Skin: Skin is warm, dry and intact. Incision well-healed  Motor Strength:Moves all extremities spontaneously with good tone.     Diagnostic Results:  All imaging was independently reviewed by myself and Dr. Chand.    AP and Lateral X-ray thoracolumbar spine, dated 10/5/23:   Postoperative change approximately T8 to L2 with hardware in stable position. Prior  posterior fusion hardware spans L3 through S1. Hardware appears intact allowing    for separate fixating rods thoracolumbar region and lumbosacral region. Mild compression of T12.    ASSESSMENT/PLAN:     Emilia Pablo s/p revision and extension of posterior spinal fusion, T8 to L2, Rene osteotomy, T9, T10, T11 and T12 on 8/22/23 with Dr. Chand. She was seen in clinic today for her 6 week post-op evaluation. States that she is doing well since surgery. Denies new neurological complaints. Outpatient PT has been ordered. A CT thoracolumbar spine has been ordered to assess for bony fusion in 6 weeks.     I would like the patient to follow-up in clinic for the 3 month post-op evaluation. I have encouraged her to contact the clinic with any questions, concerns, or adverse clinical changes. She verbalized understanding.      Kathy Vyas, MARKP-C  Neurosurgery  [facility].

## 2023-10-09 ENCOUNTER — TELEPHONE (OUTPATIENT)
Dept: NEUROSURGERY | Facility: CLINIC | Age: 78
End: 2023-10-09
Payer: MEDICARE

## 2023-10-09 NOTE — TELEPHONE ENCOUNTER
----- Message from Uli Arauz sent at 10/9/2023  4:12 PM CDT -----  Regarding: Pt Advice  Contact: pt [phone]  Pt is requesting summary from surgery/operative report 8/22 be sent to pt's email [email or web address],  Please call pt @ [phone]

## 2023-10-09 NOTE — TELEPHONE ENCOUNTER
P/C TO PT. As well as person who forwarded the message that we cannot give out any medical records and all medical records must go to medical record dept.   Pt. Verbalized understanding

## 2023-10-19 ENCOUNTER — TELEPHONE (OUTPATIENT)
Dept: NEUROSURGERY | Facility: CLINIC | Age: 78
End: 2023-10-19
Payer: MEDICARE

## 2023-10-23 ENCOUNTER — PATIENT MESSAGE (OUTPATIENT)
Dept: ADMINISTRATIVE | Facility: HOSPITAL | Age: 78
End: 2023-10-23
Payer: MEDICARE

## 2023-10-23 RX ORDER — METHOCARBAMOL 500 MG/1
TABLET, FILM COATED ORAL
Qty: 40 TABLET | Refills: 10 | Status: SHIPPED | OUTPATIENT
Start: 2023-10-23 | End: 2023-12-20

## 2023-10-26 ENCOUNTER — PATIENT MESSAGE (OUTPATIENT)
Dept: ADMINISTRATIVE | Facility: HOSPITAL | Age: 78
End: 2023-10-26
Payer: MEDICARE

## 2023-10-31 ENCOUNTER — EXTERNAL HOME HEALTH (OUTPATIENT)
Dept: HOME HEALTH SERVICES | Facility: HOSPITAL | Age: 78
End: 2023-10-31
Payer: MEDICARE

## 2023-11-13 ENCOUNTER — TELEPHONE (OUTPATIENT)
Dept: FAMILY MEDICINE | Facility: CLINIC | Age: 78
End: 2023-11-13
Payer: MEDICARE

## 2023-11-13 DIAGNOSIS — I10 PRIMARY HYPERTENSION: ICD-10-CM

## 2023-11-13 DIAGNOSIS — Q89.8: ICD-10-CM

## 2023-11-13 DIAGNOSIS — R73.9 HYPERGLYCEMIA: ICD-10-CM

## 2023-11-13 DIAGNOSIS — M19.90 ARTHRITIS: ICD-10-CM

## 2023-11-13 DIAGNOSIS — D64.9 ANEMIA, UNSPECIFIED TYPE: ICD-10-CM

## 2023-11-13 DIAGNOSIS — I70.0 AORTIC ATHEROSCLEROSIS: Primary | ICD-10-CM

## 2023-11-13 DIAGNOSIS — E83.51 HYPOCALCEMIA: ICD-10-CM

## 2023-11-13 DIAGNOSIS — G89.29 CHRONIC PAIN OF BOTH SHOULDERS: ICD-10-CM

## 2023-11-13 DIAGNOSIS — M81.0 AGE-RELATED OSTEOPOROSIS WITHOUT CURRENT PATHOLOGICAL FRACTURE: ICD-10-CM

## 2023-11-13 DIAGNOSIS — M25.511 CHRONIC PAIN OF BOTH SHOULDERS: ICD-10-CM

## 2023-11-13 DIAGNOSIS — M25.512 CHRONIC PAIN OF BOTH SHOULDERS: ICD-10-CM

## 2023-11-13 NOTE — TELEPHONE ENCOUNTER
----- Message from Franklin Niño sent at 11/13/2023  3:53 PM CST -----  Type: Lab     Who Called:Pt  Does the patient know what this is regarding?:requesting lab work prior to visit (prefers gabrielle)  Would the patient rather a call back or a response via [facility]? Call   Best Call Back Number:[phone]  Additional Information:

## 2023-11-13 NOTE — TELEPHONE ENCOUNTER
----- Message from Franklin Niño sent at 11/13/2023  3:53 PM CST -----  Type: Lab     Who Called:Pt  Does the patient know what this is regarding?:requesting lab work prior to visit (prefers gabrielle)  Would the patient rather a call back or a response via [facility]? Call   Best Call Back Number:[phone]  Additional Information:

## 2023-11-27 ENCOUNTER — TELEPHONE (OUTPATIENT)
Dept: FAMILY MEDICINE | Facility: CLINIC | Age: 78
End: 2023-11-27
Payer: MEDICARE

## 2023-11-27 NOTE — TELEPHONE ENCOUNTER
----- Message from Aracelis Goodrich sent at 11/27/2023  3:48 PM CST -----  Type:  Needs Medical Advice    Who Called: pt  Symptoms (please be specific): pt needs to have blood work put in for her annual please call back so pt knows that orders are in   Would the patient rather a call back or a response via [facility]? call  Best Call Back Number:  [phone]  Additional Information:

## 2023-11-30 ENCOUNTER — OFFICE VISIT (OUTPATIENT)
Dept: NEUROSURGERY | Facility: CLINIC | Age: 78
End: 2023-11-30
Payer: MEDICARE

## 2023-11-30 ENCOUNTER — HOSPITAL ENCOUNTER (OUTPATIENT)
Dept: RADIOLOGY | Facility: HOSPITAL | Age: 78
Discharge: HOME | End: 2023-11-30
Attending: NURSE PRACTITIONER
Payer: MEDICARE

## 2023-11-30 VITALS
SYSTOLIC BLOOD PRESSURE: 122 MMHG | WEIGHT: 146 LBS | DIASTOLIC BLOOD PRESSURE: 74 MMHG | BODY MASS INDEX: 24.92 KG/M2 | HEART RATE: 61 BPM | HEIGHT: 64 IN

## 2023-11-30 DIAGNOSIS — M48.07 SPINAL STENOSIS, LUMBOSACRAL REGION: ICD-10-CM

## 2023-11-30 DIAGNOSIS — M41.9 SCOLIOSIS, UNSPECIFIED SCOLIOSIS TYPE, UNSPECIFIED SPINAL REGION: ICD-10-CM

## 2023-11-30 DIAGNOSIS — Z98.1 HISTORY OF LUMBAR FUSION: Primary | ICD-10-CM

## 2023-11-30 DIAGNOSIS — Z98.1 S/P FUSION OF THORACIC SPINE: ICD-10-CM

## 2023-11-30 PROCEDURE — 99213 PR OFFICE/OUTPT VISIT, EST, LEVL III, 20-29 MIN: ICD-10-PCS | Mod: S$PBB,,, | Performed by: NEUROLOGICAL SURGERY

## 2023-11-30 PROCEDURE — 99999 PR PBB SHADOW E&M-EST. PATIENT-LVL III: CPT | Mod: PBBFAC,,, | Performed by: NEUROLOGICAL SURGERY

## 2023-11-30 PROCEDURE — 72131 CT LUMBAR SPINE W/O DYE: CPT | Mod: TC

## 2023-11-30 PROCEDURE — 99213 OFFICE O/P EST LOW 20 MIN: CPT | Mod: S$PBB,,, | Performed by: NEUROLOGICAL SURGERY

## 2023-11-30 PROCEDURE — 72131 CT LUMBAR SPINE WITHOUT CONTRAST: ICD-10-PCS | Mod: 26,,, | Performed by: INTERNAL MEDICINE

## 2023-11-30 PROCEDURE — 72131 CT LUMBAR SPINE W/O DYE: CPT | Mod: 26,,, | Performed by: INTERNAL MEDICINE

## 2023-11-30 PROCEDURE — 99213 OFFICE O/P EST LOW 20 MIN: CPT | Mod: PBBFAC,25 | Performed by: NEUROLOGICAL SURGERY

## 2023-11-30 PROCEDURE — 99999 PR PBB SHADOW E&M-EST. PATIENT-LVL III: ICD-10-PCS | Mod: PBBFAC,,, | Performed by: NEUROLOGICAL SURGERY

## 2023-11-30 NOTE — PROGRESS NOTES
"CHIEF COMPLAINT:  Back pain, 3 month post-op    I, Franco Montalvo , attest that this documentation has been prepared under the direction and in the presence of Pedrito Chand MD.    HPI:  Emilia Pablo is a 78 y.o.  female with a PMHx of osteoarthritis, lumbar fusion (L1-S1), and spinal fusion T10-11 for evaluation of back pain. She denies any new back pain after her surgery. She presented today in a back brace; she has been informed that she can begin to ween off the use of this. She is now able to walk with assistance of a walker, but uses a wheelchair at home for quicker mobility. She denies any balance issues, but notes feeling "wobbly" on her feet. She reports major improvement after her surgery. She is in outpatient PT. She has also been informed that she has no spinal restrictions for PT.    Patient denies any other complaints at this time.      Review of patient's allergies indicates:  No Known Allergies    Past Medical History:   Diagnosis Date    Arthritis     osteoarthritis    Hypertension     Abreu-Stickler syndrome      Past Surgical History:   Procedure Laterality Date    acdf      2 level    BACK SURGERY      laminectomy    BACK SURGERY      lumbar fusion L1-S1    CARDIAC CATHETERIZATION  2016    Normal Dr Wright; see media note; normal     SECTION      x2    EYE SURGERY Bilateral     laser   -   Nano's     JOINT REPLACEMENT Left 2013    JOINT REPLACEMENT Right 5/11/15    SPINAL FUSION N/A 2023    Procedure: FUSION, SPINE;  Surgeon: Pedrito Chand MD;  Location: [facility] OR [address] [place] [address];  Service: Neurosurgery;  Laterality: N/A;  T8-pelvis extension and revision of fusion, T10-11 discectomy; neuromonitoring, depuy    TONSILLECTOMY       Family History   Problem Relation Age of Onset    Crohn's disease Son     Stickler syndrome Son     Diabetes Mother     Heart disease Mother     Arthritis Mother     Cancer Sister         melanoma    Heart disease Brother     Heart disease " Sister     No Known Problems Son      Social History     Tobacco Use    Smoking status: Never     Passive exposure: Never    Smokeless tobacco: Never   Substance Use Topics    Alcohol use: Yes     Comment: occasional        Review of Systems   Constitutional:  Negative for unexpected weight change.   HENT:  Negative for nosebleeds.    Eyes:  Negative for pain.   Respiratory:  Negative for apnea.    Cardiovascular:  Negative for palpitations.   Gastrointestinal:  Negative for constipation.   Genitourinary:  Negative for decreased urine volume, difficulty urinating, dysuria, enuresis, frequency and urgency.   Musculoskeletal:  Negative for back pain and gait problem.   Skin:  Negative for pallor.   Neurological:  Negative for numbness.   Hematological:  Does not bruise/bleed easily.   Psychiatric/Behavioral:  Negative for sleep disturbance.        OBJECTIVE:   Vital Signs:       Physical Exam:    Vital signs: All nursing notes and vital signs reviewed -- afebrile, vital signs stable.  Constitutional: Patient sitting comfortably in chair. Appears well developed and well nourished.  Skin: Exposed areas are intact without abnormal markings, rashes or other lesions.  HEENT: Normocephalic. Normal conjunctivae.  Cardiovascular: Normal rate and regular rhythm.  Respiratory: Chest wall rises and falls symmetrically, without signs of respiratory distress.  Abdomen: Soft and non-tender.  Extremities: Warm and without edema. Calves supple, non-tender.  Psych/Behavior: Normal affect.    Neurological:    Mental status: Alert and oriented. Conversational and appropriate.       Cranial Nerves: VFF to confrontation. PERRL. EOMI without nystagmus. Facial STLT normal and symmetric. Strong, symmetric muscles of mastication. Facial strength full and symmetric. Hearing equal bilaterally to finger rub. Palate and uvula rise and fall normally in midline. Shoulder shrug 5/5 strength. Tongue midline.     Motor:    Upper:  Deltoids Triceps  Biceps WE WF     R 5/5 5/5 5/5 5/5 5/5 5/5    L 5/5 5/5 5/5 5/5 5/5 5/5      Lower:  HF KE KF DF PF EHL    R 5/5 5/5 5/5 5/5 5/5 5/5    L 5/5 5/5 5/5 1/5 5/5 1/5     Sensory: Intact sensation to light touch in all extremities. Romberg negative.    Reflexes:          DTR: 2+ symmetrically throughout.     Burnett's: Negative.     Babinski's: Negative.     Clonus: Negative.    Cerebellar: Finger-to-nose and rapid alternating movements normal. Gait stable, fluid.    Spine:    Posture: Head well aligned over pelvis in front and side views.  No focal or global spinal deformity visible on inspection. Shoulders and hips even. No obvious leg length discrepancy. No scapula winging.    Bending: Full ROM with forward, back and lateral bending. No rib prominence with forward bend.    Cervical:      ROM: Full with flexion, extension, lateral rotation and ear-to-shoulder bend.      Midline TTP: Negative.     Spurling's test: Negative.     Lhermitte's: Negative.    Thoracic:     Midline TTP: Negative     No TTP at the top of the construct.    Lumbar:     Midline TTP: Negative     Straight Leg Test: Negative     Crossed Straight Leg Test: Negative     Sciatic notch tenderness: Negative.    Other:     SI joint TTP: Negative.     Greater trochanter TTP: Negative.     Tenderness with external/internal hip rotation: Negative.    Diagnostic Results:  All imaging was independently reviewed by me.    CT lumbar spine:  Solid fusion T11-sacrum.  Proximal construct not visualized.    ASSESSMENT/PLAN:     Emilia Pablo is doing great three months after giant disc resection and extension of fusion for thoracic myelopathy. Since surgery she has regained the ability to walk. She has fused and can wean her brace. She can also continue with PT without spine restrictions.    The patient understands and agrees with the plan of care. All questions were answered.     1. RTC at 1 year postop      I, Dr. Pedrito Chand personally performed the  services described in this documentation. All medical record entries made by the scribe, Franco Montalvo , were at my direction and in my presence.  I have reviewed the chart and agree that the record reflects my personal performance and is accurate and complete.      Pedrito Chand M.D.  Department of Neurosurgery  [facility]

## 2023-12-04 ENCOUNTER — TELEPHONE (OUTPATIENT)
Dept: FAMILY MEDICINE | Facility: CLINIC | Age: 78
End: 2023-12-04
Payer: MEDICARE

## 2023-12-04 NOTE — TELEPHONE ENCOUNTER
Manually re-faxed lab orders to [facility]    ----- Message from Berhane Choi sent at 12/4/2023  2:23 PM CST -----  Contact: pt  Type: Requesting to speak with nurse        Who Called: PT  Regarding: would like to speak with Margaret. States she would like to do labs tomorrow but [facility] do not have the orders.  Would the patient rather a call back or a response via [facility]? Call back  Best Call Back Number:  [phone]  Additional Information:

## 2023-12-04 NOTE — TELEPHONE ENCOUNTER
Returned pt's call; LVM lab orders faxed successfully    ----- Message from Lilliana Silverman sent at 12/4/2023  3:57 PM CST -----  Type:  Patient Returning Call    Who Called:pt   Who Left Message for Patient:Margaret   Does the patient know what this is regarding?:Fax   Would the patient rather a call back or a response via [facility]? Call   Best Call Back Number: [phone]  Additional Information:

## 2023-12-05 LAB
CHOLEST SERPL-MSCNC: 220 MG/DL (ref 0–200)
HDLC SERPL-MCNC: 98 MG/DL (ref 35–70)
LDLC SERPL CALC-MCNC: 114 MG/DL (ref 0–160)
TRIGL SERPL-MCNC: 41 MG/DL (ref 40–160)

## 2023-12-08 ENCOUNTER — PATIENT OUTREACH (OUTPATIENT)
Dept: ADMINISTRATIVE | Facility: HOSPITAL | Age: 78
End: 2023-12-08
Payer: MEDICARE

## 2023-12-12 ENCOUNTER — TELEPHONE (OUTPATIENT)
Dept: FAMILY MEDICINE | Facility: CLINIC | Age: 78
End: 2023-12-12
Payer: MEDICARE

## 2023-12-12 DIAGNOSIS — R82.81 PYURIA: Primary | ICD-10-CM

## 2023-12-12 NOTE — TELEPHONE ENCOUNTER
----- Message from Rosie Mendoza sent at 12/7/2023 10:42 AM CST -----  Regarding: [facility]/ Lab results  Contact: fax  Dr. Gibson    Please review document  scanned into patient's media.     [facility]/ Lab results

## 2023-12-12 NOTE — TELEPHONE ENCOUNTER
----- Message from Dave Gibson MD sent at 12/12/2023  7:02 AM CST -----  Regarding: RE: [facility]/ Lab results  Contact: fax  Urine infected; culture ordered  ----- Message -----  From: Rosie Mendoza  Sent: 12/7/2023  10:42 AM CST  To: Dave Gibson MD; Devi Gillis LPN  Subject: [facility]/ Lab results                   Dr. Gibson    Please review document  scanned into patient's media.     [facility]/ Lab results

## 2023-12-20 ENCOUNTER — OFFICE VISIT (OUTPATIENT)
Dept: FAMILY MEDICINE | Facility: CLINIC | Age: 78
End: 2023-12-20
Payer: MEDICARE

## 2023-12-20 VITALS
TEMPERATURE: 98 F | HEART RATE: 86 BPM | BODY MASS INDEX: 26.25 KG/M2 | SYSTOLIC BLOOD PRESSURE: 122 MMHG | HEIGHT: 64 IN | DIASTOLIC BLOOD PRESSURE: 68 MMHG | OXYGEN SATURATION: 98 % | WEIGHT: 153.75 LBS

## 2023-12-20 DIAGNOSIS — Z00.00 ANNUAL PHYSICAL EXAM: Primary | ICD-10-CM

## 2023-12-20 PROCEDURE — 99397 PER PM REEVAL EST PAT 65+ YR: CPT | Mod: GZ,S$GLB,, | Performed by: FAMILY MEDICINE

## 2023-12-20 PROCEDURE — 99397 PR PREVENTIVE VISIT,EST,65 & OVER: ICD-10-PCS | Mod: GZ,S$GLB,, | Performed by: FAMILY MEDICINE

## 2023-12-20 RX ORDER — GABAPENTIN 300 MG/1
300 CAPSULE ORAL NIGHTLY
Qty: 90 CAPSULE | Refills: 3
Start: 2023-12-20 | End: 2024-01-22

## 2023-12-20 RX ORDER — CELECOXIB 200 MG/1
200 CAPSULE ORAL DAILY
Qty: 90 CAPSULE | Refills: 3
Start: 2023-12-20

## 2023-12-20 RX ORDER — METHOCARBAMOL 500 MG/1
500 TABLET, FILM COATED ORAL NIGHTLY
Qty: 90 TABLET | Refills: 3
Start: 2023-12-20

## 2023-12-20 NOTE — PROGRESS NOTES
"Subjective:      Patient ID: Emilia Pablo is a 78 y.o. female.    Chief Complaint: Annual Exam      Vitals:    12/20/23 1058   BP: 122/68   Pulse: 86   Temp: 98 °F (36.7 °C)   SpO2: 98%   Weight: 69.7 kg (153 lb 12.3 oz)   Height: 5' 4" (1.626 m)        HPI   Annual; meds reviewed, goes to PT at [facility]  Has below Dx  Goes to [facility] PT at [facility]  2 normal angiograms, though pos family hx, this pt doesn't need a statin  Labs all good  Problem List  Patient Active Problem List   Diagnosis    Neck pain    Hypertension    Primary insomnia    Chronic mixed headache syndrome    Chronic pain of both shoulders    Arthritis    Age-related osteoporosis without current pathological fracture    Abreu-Stickler syndrome    Thoracic spinal stenosis    Urinary retention    Acute blood loss anemia    Hypocalcemia    Aortic atherosclerosis    Anemia        ALLERGIES: Review of patient's allergies indicates:  No Known Allergies    MEDS:   Current Outpatient Medications:     acetaminophen (TYLENOL) 650 MG TbSR, Take 650 mg by mouth every 8 (eight) hours as needed (pain)., Disp: , Rfl:     b complex vitamins capsule, Take 1 capsule by mouth once daily., Disp: , Rfl:     denosumab (PROLIA) 60 mg/mL Syrg, Inject 60 mg into the skin every 6 (six) months., Disp: , Rfl:     famotidine (PEPCID ORAL), Take 1 tablet by mouth daily as needed (heartburn)., Disp: , Rfl:     fish oil-omega-3 fatty acids 300-1,000 mg capsule, Take 2 g by mouth once daily., Disp: , Rfl:     glucosamine-chondroitin 500-400 mg tablet, Take 1 tablet by mouth 2 (two) times daily., Disp: , Rfl:     loratadine (CLARITIN) 10 mg tablet, Take 10 mg by mouth daily as needed for Allergies., Disp: , Rfl:     melatonin (MELATIN) 3 mg tablet, Take 2 tablets (6 mg total) by mouth nightly as needed for Insomnia., Disp: , Rfl: 0    olmesartan (BENICAR) 5 MG Tab, TAKE 1 TABLET EVERY DAY, Disp: 90 tablet, Rfl: 3    oxymetazoline (AFRIN) 0.05 % nasal spray, 2 sprays by Nasal " route daily as needed for Congestion., Disp: , Rfl:     vitamin D 1000 units Tab, Take 2,000 Units by mouth once daily., Disp: , Rfl:     celecoxib (CELEBREX) 200 MG capsule, Take 1 capsule (200 mg total) by mouth once daily., Disp: 90 capsule, Rfl: 3    gabapentin (NEURONTIN) 300 MG capsule, Take 1 capsule (300 mg total) by mouth every evening., Disp: 90 capsule, Rfl: 3    methocarbamoL (ROBAXIN) 500 MG Tab, Take 1 tablet (500 mg total) by mouth every evening., Disp: 90 tablet, Rfl: 3      History:  Current Providers as of 2023  PCP: Dave Gibson MD  Care Team Provider: Ayush Juan MD  Care Team Provider: Marquis Aviles MD  Care Team Provider: Daniel Wright MD  Care Team Provider: Lionel Gandhi MD  Care Team Provider: Vianey Schrader MD  Care Team Provider: Lsea Bowling LPN  Care Team Provider: Pedrito Chand MD  Encounter Provider: Dave Gibson MD, starting on Wed Dec 20, 2023 12:00 AM  Referring Provider: not found, starting on Wed Dec 20, 2023 12:00 AM  Consulting Physician: Dave Gibson MD, starting on Wed Dec 20, 2023 10:55 AM, ending on Wed Dec 20, 2023 11:42 AM (Inactive)   Past Medical History:   Diagnosis Date    Arthritis     osteoarthritis    Hypertension     Abreu-Stickler syndrome      Past Surgical History:   Procedure Laterality Date    acdf      2 level    BACK SURGERY      laminectomy    BACK SURGERY      lumbar fusion L1-S1    CARDIAC CATHETERIZATION  2016    Normal Dr Wright; see media note; normal     SECTION      x2    EYE SURGERY Bilateral     laser   -   Nano's     JOINT REPLACEMENT Left 2013    JOINT REPLACEMENT Right 5/11/15    SPINAL FUSION N/A 2023    Procedure: FUSION, SPINE;  Surgeon: Pedrito Chand MD;  Location: [facility] OR [address] [place] [address];  Service: Neurosurgery;  Laterality: N/A;  T8-pelvis extension and revision of fusion, T10-11 discectomy; neuromonitoring, depuy    TONSILLECTOMY       Social History      Tobacco Use    Smoking status: Never     Passive exposure: Never    Smokeless tobacco: Never   Substance Use Topics    Alcohol use: Yes     Comment: occasional         Review of Systems   Constitutional: Negative.    HENT: Negative.     Respiratory: Negative.     Cardiovascular: Negative.    Gastrointestinal: Negative.    Endocrine: Negative.    Genitourinary: Negative.    Musculoskeletal:  Positive for gait problem and joint swelling.   Psychiatric/Behavioral: Negative.     All other systems reviewed and are negative.    Objective:     Physical Exam  Vitals and nursing note reviewed.   Constitutional:       Appearance: She is well-developed.   HENT:      Head: Normocephalic.   Eyes:      Conjunctiva/sclera: Conjunctivae normal.      Pupils: Pupils are equal, round, and reactive to light.   Cardiovascular:      Rate and Rhythm: Normal rate and regular rhythm.      Heart sounds: Normal heart sounds.   Pulmonary:      Effort: Pulmonary effort is normal.      Breath sounds: Normal breath sounds.   Musculoskeletal:         General: Deformity present. Normal range of motion.      Cervical back: Normal range of motion and neck supple.      Right lower leg: No edema.      Left lower leg: Edema present.   Skin:     General: Skin is warm and dry.   Neurological:      General: No focal deficit present.      Mental Status: She is alert and oriented to person, place, and time. Mental status is at baseline.      Gait: Gait abnormal.      Deep Tendon Reflexes: Reflexes are normal and symmetric.   Psychiatric:         Mood and Affect: Mood normal.         Behavior: Behavior normal.         Thought Content: Thought content normal.         Judgment: Judgment normal.             Assessment:     1. Annual physical exam      Plan:        Medication List            Accurate as of December 20, 2023 11:59 PM. If you have any questions, ask your nurse or doctor.                CHANGE how you take these medications      celecoxib 200 MG  capsule  Commonly known as: CeleBREX  Take 1 capsule (200 mg total) by mouth once daily.  What changed: when to take this  Changed by: Dave Gibson MD     methocarbamoL 500 MG Tab  Commonly known as: ROBAXIN  Take 1 tablet (500 mg total) by mouth every evening.  What changed: See the new instructions.  Changed by: Dave Gibson MD            CONTINUE taking these medications      acetaminophen 650 MG Tbsr  Commonly known as: TYLENOL     b complex vitamins capsule     fish oil-omega-3 fatty acids 300-1,000 mg capsule     gabapentin 300 MG capsule  Commonly known as: NEURONTIN  Take 1 capsule (300 mg total) by mouth every evening.     glucosamine-chondroitin 500-400 mg tablet     loratadine 10 mg tablet  Commonly known as: CLARITIN     melatonin 3 mg tablet  Commonly known as: MELATIN  Take 2 tablets (6 mg total) by mouth nightly as needed for Insomnia.     olmesartan 5 MG Tab  Commonly known as: BENICAR  TAKE 1 TABLET EVERY DAY     oxymetazoline 0.05 % nasal spray  Commonly known as: AFRIN     PEPCID ORAL     PROLIA 60 mg/mL Syrg  Generic drug: denosumab     vitamin D 1000 units Tab  Commonly known as: VITAMIN D3            STOP taking these medications      baclofen 5 mg Tab tablet  Commonly known as: LIORESAL  Stopped by: Dave Gibson MD     heparin (porcine) 5,000 unit/mL injection  Stopped by: Dave Gibson MD     oxyCODONE 10 mg Tab immediate release tablet  Commonly known as: ROXICODONE  Stopped by: Dave Gibson MD     oxyCODONE 5 MG immediate release tablet  Commonly known as: ROXICODONE  Stopped by: Dave Gibson MD     silodosin 4 mg Cap capsule  Commonly known as: RAPAFLO  Stopped by: Dave Gibson MD               Where to Get Your Medications        Information about where to get these medications is not yet available    Ask your nurse or doctor about these medications  celecoxib 200 MG capsule  gabapentin 300 MG capsule  methocarbamoL 500 MG Tab       Annual physical exam    Other  orders  -     gabapentin (NEURONTIN) 300 MG capsule; Take 1 capsule (300 mg total) by mouth every evening.  Dispense: 90 capsule; Refill: 3  -     methocarbamoL (ROBAXIN) 500 MG Tab; Take 1 tablet (500 mg total) by mouth every evening.  Dispense: 90 tablet; Refill: 3  -     celecoxib (CELEBREX) 200 MG capsule; Take 1 capsule (200 mg total) by mouth once daily.  Dispense: 90 capsule; Refill: 3

## 2024-01-11 DIAGNOSIS — Z00.00 ENCOUNTER FOR MEDICARE ANNUAL WELLNESS EXAM: ICD-10-CM

## 2024-01-22 RX ORDER — GABAPENTIN 300 MG/1
CAPSULE ORAL
Qty: 270 CAPSULE | Refills: 3 | Status: SHIPPED | OUTPATIENT
Start: 2024-01-22

## 2024-01-22 NOTE — TELEPHONE ENCOUNTER
No care due was identified.  Health [facility] Embedded Care Due Messages. Reference number: [id number].   1/21/2024 10:49:14 PM CST

## 2024-02-28 LAB
CHOLEST SERPL-MSCNC: 206 MG/DL (ref 0–200)
HDLC SERPL-MCNC: 88 MG/DL (ref 35–70)
LDLC SERPL CALC-MCNC: 106 MG/DL (ref 0–160)
TRIGL SERPL-MCNC: 58 MG/DL (ref 40–160)

## 2024-03-03 ENCOUNTER — PATIENT MESSAGE (OUTPATIENT)
Dept: FAMILY MEDICINE | Facility: CLINIC | Age: 79
End: 2024-03-03
Payer: MEDICARE

## 2024-03-05 ENCOUNTER — PATIENT OUTREACH (OUTPATIENT)
Dept: ADMINISTRATIVE | Facility: HOSPITAL | Age: 79
End: 2024-03-05
Payer: MEDICARE

## 2024-03-05 NOTE — PROGRESS NOTES
Population Health Chart Review & Patient Outreach Details      Additional [facility] Health Notes:               Updates Requested / Reviewed:      Other    [facility]         Health Maintenance Topics Overdue:    Health Maintenance Due   Topic Date Due    Shingles Vaccine (1 of 2) Never done    RSV Vaccine (Age 60+ and Pregnant patients) (1 - 1-dose 60+ series) Never done    COVID-19 Vaccine (3 - 2023-24 season) 09/01/2023         Health Maintenance Topic(s) Outreach Outcomes & Actions Taken:    Lab(s) - Outreach Outcomes & Actions Taken  : External Records Uploaded & Care Team Updated if Applicable

## 2024-04-08 DIAGNOSIS — I10 ESSENTIAL HYPERTENSION: ICD-10-CM

## 2024-04-08 NOTE — TELEPHONE ENCOUNTER
No care due was identified.  Health [facility] Embedded Care Due Messages. Reference number: [id number].   4/08/2024 5:50:10 PM CDT

## 2024-04-08 NOTE — TELEPHONE ENCOUNTER
----- Message from Kathy Vyas NP sent at 8/18/2023  1:20 PM CDT -----  Regarding: Hepatic Lesions  Good Afternoon,    Dr. Lemos recently performed a CT on this patient and the radiologist mentioned hepatic lesions seen on imaging. A ultrasound or dedicated CT was suggested. Could you please ask Dr. Gibson if he could evaluate this further.    Thank you,      REGINALD Pedraza-VIVEK  Neurosurgery  [facility]         Spoke to pharmacist, oteseconazole 150 not covered by patient pharmacy plan.   Changed to diflucan.     Requested Prescriptions     Signed Prescriptions Disp Refills    fluconazole (DIFLUCAN) 150 MG Tab 2 tablet 0     Sig: Take 1 tablet (150 mg total) by mouth once daily.     Authorizing Provider: HARIS GILLETTE     1. Antibiotic-induced yeast infection  fluconazole (DIFLUCAN) 150 MG Tab

## 2024-04-09 RX ORDER — OLMESARTAN MEDOXOMIL 5 MG/1
TABLET ORAL
Qty: 90 TABLET | Refills: 1 | Status: SHIPPED | OUTPATIENT
Start: 2024-04-09

## 2024-04-09 NOTE — TELEPHONE ENCOUNTER
Refill Decision Note   Emilia Pablo  is requesting a refill authorization.  Brief Assessment and Rationale for Refill:  Approve     Medication Therapy Plan:        Comments:     Note composed:4:12 PM 04/09/2024

## 2024-04-15 RX ORDER — CELECOXIB 200 MG/1
200 CAPSULE ORAL DAILY
Qty: 90 CAPSULE | Refills: 1
Start: 2024-04-15 | End: 2024-04-24 | Stop reason: SDUPTHER

## 2024-04-15 NOTE — TELEPHONE ENCOUNTER
No care due was identified.  [facility] Embedded Care Due Messages. Reference number: [id number].   4/15/2024 10:14:13 AM CDT

## 2024-04-23 ENCOUNTER — TELEPHONE (OUTPATIENT)
Dept: FAMILY MEDICINE | Facility: CLINIC | Age: 79
End: 2024-04-23
Payer: MEDICARE

## 2024-04-23 NOTE — TELEPHONE ENCOUNTER
----- Message from Jabari Lindsey sent at 4/23/2024  3:46 PM CDT -----  Contact: [facility]   .Type:  RX Refill Request    Who Called:   Refill or New Rx: refill   RX Name and Strength:celecoxib (CELEBREX) 200 MG capsule  How is the patient currently taking it? (ex. 1XDay):  Is this a 30 day or 90 day RX: 90   Preferred Pharmacy with phone number:[facility] - [facility] [address]   Phone: [phone]  Fax: [phone]  Local or Mail Order: Mail order  Ordering Provider:  Would the patient rather a call back or a response via [facility]?   Best Call Back Number:  Additional Information:

## 2024-04-23 NOTE — TELEPHONE ENCOUNTER
No care due was identified.  [facility] Embedded Care Due Messages. Reference number: [id number].   4/23/2024 2:47:53 PM CDT

## 2024-04-24 ENCOUNTER — PATIENT MESSAGE (OUTPATIENT)
Dept: FAMILY MEDICINE | Facility: CLINIC | Age: 79
End: 2024-04-24
Payer: MEDICARE

## 2024-04-24 RX ORDER — CELECOXIB 200 MG/1
200 CAPSULE ORAL DAILY
Qty: 90 CAPSULE | Refills: 3 | Status: SHIPPED | OUTPATIENT
Start: 2024-04-24

## 2024-05-20 ENCOUNTER — TELEPHONE (OUTPATIENT)
Dept: NEUROSURGERY | Facility: CLINIC | Age: 79
End: 2024-05-20
Payer: MEDICARE

## 2024-05-20 NOTE — TELEPHONE ENCOUNTER
"Attempted to call patient in regards to message requesting "annual appointment". LVM stating that her next appointment wouldn't be until around late August at 1 year post-op, and that a recall was already in place to remind us  "

## 2024-07-03 ENCOUNTER — OFFICE VISIT (OUTPATIENT)
Dept: FAMILY MEDICINE | Facility: CLINIC | Age: 79
End: 2024-07-03
Payer: MEDICARE

## 2024-07-03 VITALS
WEIGHT: 159.19 LBS | HEART RATE: 76 BPM | OXYGEN SATURATION: 97 % | TEMPERATURE: 98 F | SYSTOLIC BLOOD PRESSURE: 122 MMHG | BODY MASS INDEX: 27.18 KG/M2 | DIASTOLIC BLOOD PRESSURE: 62 MMHG | HEIGHT: 64 IN

## 2024-07-03 DIAGNOSIS — S24.103A UNSPECIFIED INJURY AT T7-T10 LEVEL OF THORACIC SPINAL CORD, INITIAL ENCOUNTER: ICD-10-CM

## 2024-07-03 DIAGNOSIS — G95.9 ACUTE MYELOPATHY: Primary | ICD-10-CM

## 2024-07-03 DIAGNOSIS — L82.1 SEBORRHEIC KERATOSIS: ICD-10-CM

## 2024-07-03 DIAGNOSIS — I70.0 AORTIC ATHEROSCLEROSIS: ICD-10-CM

## 2024-07-03 DIAGNOSIS — I10 ESSENTIAL HYPERTENSION: ICD-10-CM

## 2024-07-03 DIAGNOSIS — M19.90 ARTHRITIS: ICD-10-CM

## 2024-07-03 PROCEDURE — 20610 DRAIN/INJ JOINT/BURSA W/O US: CPT | Mod: RT,S$GLB,, | Performed by: FAMILY MEDICINE

## 2024-07-03 PROCEDURE — 99214 OFFICE O/P EST MOD 30 MIN: CPT | Mod: 25,S$GLB,, | Performed by: FAMILY MEDICINE

## 2024-07-03 RX ORDER — OLMESARTAN MEDOXOMIL 5 MG/1
5 TABLET ORAL DAILY
Qty: 90 TABLET | Refills: 3 | Status: SHIPPED | OUTPATIENT
Start: 2024-07-03

## 2024-07-03 NOTE — PROGRESS NOTES
"Subjective:      Patient ID: Emilia Pablo is a 79 y.o. female.    Chief Complaint: Follow-up (6 mon)      Vitals:    07/03/24 1054   BP: 122/62   Pulse: 76   Temp: 98 °F (36.7 °C)   TempSrc: Oral   SpO2: 97%   Weight: 72.2 kg (159 lb 2.8 oz)   Height: 5' 4" (1.626 m)        HPI   Follow up; here at [facility], not going to PT; using a cane now insteadf of a walker  Scalp lesion appears to be SK, referral placed    Problem List  Patient Active Problem List   Diagnosis    Neck pain    Hypertension    Primary insomnia    Chronic mixed headache syndrome    Chronic pain of both shoulders    Arthritis    Age-related osteoporosis without current pathological fracture    Abreu-Stickler syndrome    Thoracic spinal stenosis    Urinary retention    Acute blood loss anemia    Hypocalcemia    Aortic atherosclerosis    Anemia    Acute myelopathy    Unspecified injury at T7-t10 level of thoracic spinal cord, initial encounter        ALLERGIES: Review of patient's allergies indicates:  No Known Allergies    MEDS:   Current Outpatient Medications:     acetaminophen (TYLENOL) 650 MG TbSR, Take 650 mg by mouth every 8 (eight) hours as needed (pain)., Disp: , Rfl:     b complex vitamins capsule, Take 1 capsule by mouth once daily., Disp: , Rfl:     celecoxib (CELEBREX) 200 MG capsule, Take 1 capsule (200 mg total) by mouth once daily. With food as needed for arthritis, Disp: 90 capsule, Rfl: 3    denosumab (PROLIA) 60 mg/mL Syrg, Inject 60 mg into the skin every 6 (six) months., Disp: , Rfl:     famotidine (PEPCID ORAL), Take 1 tablet by mouth daily as needed (heartburn)., Disp: , Rfl:     fish oil-omega-3 fatty acids 300-1,000 mg capsule, Take 2 g by mouth once daily., Disp: , Rfl:     gabapentin (NEURONTIN) 300 MG capsule, TAKE 2 CAPSULES EVERY NIGHT AND 1 CAPSULE DURING THE DAY AS NEEDED FOR NERVE PAIN, Disp: 270 capsule, Rfl: 3    loratadine (CLARITIN) 10 mg tablet, Take 10 mg by mouth daily as needed for Allergies., Disp: , Rfl: "     melatonin (MELATIN) 3 mg tablet, Take 2 tablets (6 mg total) by mouth nightly as needed for Insomnia., Disp: , Rfl: 0    methocarbamoL (ROBAXIN) 500 MG Tab, Take 1 tablet (500 mg total) by mouth every evening., Disp: 90 tablet, Rfl: 3    olmesartan (BENICAR) 5 MG Tab, TAKE 1 TABLET EVERY DAY, Disp: 90 tablet, Rfl: 1    oxymetazoline (AFRIN) 0.05 % nasal spray, 2 sprays by Nasal route daily as needed for Congestion., Disp: , Rfl:     vitamin D 1000 units Tab, Take 2,000 Units by mouth once daily., Disp: , Rfl:     celecoxib (CELEBREX) 200 MG capsule, Take 1 capsule (200 mg total) by mouth once daily. With food as needed for arthritis (Patient not taking: Reported on 7/3/2024), Disp: 90 capsule, Rfl: 3    glucosamine-chondroitin 500-400 mg tablet, Take 1 tablet by mouth 2 (two) times daily., Disp: , Rfl:       History:  Current Providers as of 7/3/2024  PCP: Dave Gbison MD  Care Team Provider: Ayush Juan MD  Care Team Provider: Marquis Aviles MD  Care Team Provider: Daniel Wright MD  Care Team Provider: Lionel Gandhi MD  Care Team Provider: Vianey Schrader MD  Care Team Provider: Lesa Bowling LPN  Care Team Provider: Pedrito Chand MD  Encounter Provider: Dave Gibson MD, starting on Wed Jul 3, 2024 12:00 AM  Referring Provider: not found, starting on Wed Jul 3, 2024 12:00 AM  Consulting Physician: Dave Gibson MD, starting on Wed Jul 3, 2024 10:51 AM (Active)   Past Medical History:   Diagnosis Date    Arthritis     osteoarthritis    Hypertension     Abreu-Stickler syndrome      Past Surgical History:   Procedure Laterality Date    acdf      2 level    BACK SURGERY      laminectomy    BACK SURGERY      lumbar fusion L1-S1    CARDIAC CATHETERIZATION  2016    Normal Dr Wright; see media note; normal     SECTION      x2    EYE SURGERY Bilateral     laser   -   Nano's     JOINT REPLACEMENT Left 2013    JOINT REPLACEMENT Right  05/11/2015    SPINAL FUSION N/A 08/22/2023    Procedure: FUSION, SPINE;  Surgeon: Pedrito Chand MD;  Location: [facility] OR [address] [place] [address];  Service: Neurosurgery;  Laterality: N/A;  T8-pelvis extension and revision of fusion, T10-11 discectomy; neuromonitoring, depuy    TONSILLECTOMY      TUBAL LIGATION  1982     Social History     Tobacco Use    Smoking status: Never     Passive exposure: Never    Smokeless tobacco: Never    Tobacco comments:     Very short period 50yrs ago   Substance Use Topics    Alcohol use: Not Currently     Comment: occasional    Drug use: Never         Review of Systems   Constitutional: Negative.    HENT:  Positive for hearing loss.    Respiratory: Negative.     Cardiovascular: Negative.    Gastrointestinal: Negative.    Endocrine: Negative.    Genitourinary: Negative.    Musculoskeletal:  Positive for gait problem.   Skin:  Positive for color change.   Psychiatric/Behavioral: Negative.     All other systems reviewed and are negative.    Objective:     Physical Exam  Vitals and nursing note reviewed.   Constitutional:       Appearance: She is well-developed.   HENT:      Head: Normocephalic.   Eyes:      Conjunctiva/sclera: Conjunctivae normal.      Pupils: Pupils are equal, round, and reactive to light.   Cardiovascular:      Rate and Rhythm: Normal rate and regular rhythm.      Heart sounds: Normal heart sounds.   Pulmonary:      Effort: Pulmonary effort is normal.      Breath sounds: Normal breath sounds.   Musculoskeletal:      Cervical back: Normal range of motion and neck supple.   Skin:     General: Skin is warm and dry.      Findings: Lesion present.      Comments: Right scalp     Neurological:      General: No focal deficit present.      Mental Status: She is alert and oriented to person, place, and time. Mental status is at baseline.      Deep Tendon Reflexes: Reflexes are normal and symmetric.   Psychiatric:         Mood and Affect: Mood normal.         Behavior: Behavior normal.          Thought Content: Thought content normal.         Judgment: Judgment normal.             Assessment:     1. Acute myelopathy    2. Unspecified injury at T7-t10 level of thoracic spinal cord, initial encounter    3. Aortic atherosclerosis      Plan:        Medication List            Accurate as of July 3, 2024 11:20 AM. If you have any questions, ask your nurse or doctor.                CONTINUE taking these medications      acetaminophen 650 MG Tbsr  Commonly known as: TYLENOL     b complex vitamins capsule     * celecoxib 200 MG capsule  Commonly known as: CeleBREX  Take 1 capsule (200 mg total) by mouth once daily. With food as needed for arthritis     * celecoxib 200 MG capsule  Commonly known as: CeleBREX  Take 1 capsule (200 mg total) by mouth once daily. With food as needed for arthritis     fish oil-omega-3 fatty acids 300-1,000 mg capsule     gabapentin 300 MG capsule  Commonly known as: NEURONTIN  TAKE 2 CAPSULES EVERY NIGHT AND 1 CAPSULE DURING THE DAY AS NEEDED FOR NERVE PAIN     glucosamine-chondroitin 500-400 mg tablet     loratadine 10 mg tablet  Commonly known as: CLARITIN     melatonin 3 mg tablet  Commonly known as: MELATIN  Take 2 tablets (6 mg total) by mouth nightly as needed for Insomnia.     methocarbamoL 500 MG Tab  Commonly known as: ROBAXIN  Take 1 tablet (500 mg total) by mouth every evening.     olmesartan 5 MG Tab  Commonly known as: BENICAR  TAKE 1 TABLET EVERY DAY     oxymetazoline 0.05 % nasal spray  Commonly known as: AFRIN     PEPCID ORAL     PROLIA 60 mg/mL Syrg  Generic drug: denosumab     vitamin D 1000 units Tab  Commonly known as: VITAMIN D3           * This list has 2 medication(s) that are the same as other medications prescribed for you. Read the directions carefully, and ask your doctor or other care provider to review them with you.                Acute myelopathy    Unspecified injury at T7-t10 level of thoracic spinal cord, initial encounter    Aortic  atherosclerosis

## 2024-07-03 NOTE — PROCEDURES
Large Joint Aspiration/Injection: R knee    Date/Time: 7/3/2024 10:30 AM    Performed by: Dave Gibson MD  Authorized by: Dave Gibson MD    Consent Done?:  Yes (Verbal)  Indications:  Pain  Timeout: prior to procedure the correct patient, procedure, and site was verified    Local anesthesia used?: No      Details:  Needle Size:  22 G  Ultrasonic Guidance for needle placement?: No    Approach:  Anterior  Location:  Knee  Site:  R knee  Medications:  10 mg triamcinolone acetonide 10 mg/mL  Patient tolerance:  Patient tolerated the procedure well with no immediate complications

## 2024-07-08 ENCOUNTER — PATIENT MESSAGE (OUTPATIENT)
Dept: NEUROSURGERY | Facility: CLINIC | Age: 79
End: 2024-07-08
Payer: MEDICARE

## 2024-07-31 RX ORDER — CELECOXIB 200 MG/1
200 CAPSULE ORAL DAILY
Qty: 90 CAPSULE | Refills: 3 | Status: SHIPPED | OUTPATIENT
Start: 2024-07-31

## 2024-07-31 NOTE — TELEPHONE ENCOUNTER
Care Due:                  Date            Visit Type   Department     Provider  --------------------------------------------------------------------------------                                EP -                              PRIMARY      [facility]  Last Visit: 07-      CARE ([facility])   BEENA Gibson                              EP -                              PRIMARY      [facility]  Next Visit: 01-      CARE ([facility])   BEENA Gibson                                                            Last  Test          Frequency    Reason                     Performed    Due Date  --------------------------------------------------------------------------------    CBC.........  12 months..  celecoxib................  08- 08-    CMP.........  12 months..  celecoxib, olmesartan....  08- 08-    Health [facility] Embedded Care Due Messages. Reference number: [id number].   7/31/2024 10:47:31 AM CDT

## 2024-07-31 NOTE — TELEPHONE ENCOUNTER
----- Message from Citlalli Staton sent at 7/31/2024 10:08 AM CDT -----  Type:  RX Refill Request    Who Called: Pt   Refill or New Rx:refill   RX Name and Strength:celecoxib (CELEBREX) 200 MG capsule  How is the patient currently taking it? (ex. 1XDay):1x   Is this a 30 day or 90 day RX:90  Preferred Pharmacy with phone number:[facility] - [facility] [address]    Phone: [phone]  Fax: [phone]  Would the patient rather a call back or a response via [facility]? Call back   Best Call Back Number:[phone]  Additional Information: pt states [facility] advise her they sent in for a refill on July 22 and the order was denied cancelled to early to refill when she checked the the nima

## 2024-08-12 ENCOUNTER — TELEPHONE (OUTPATIENT)
Dept: FAMILY MEDICINE | Facility: CLINIC | Age: 79
End: 2024-08-12
Payer: MEDICARE

## 2024-08-12 DIAGNOSIS — Z12.31 ENCOUNTER FOR SCREENING MAMMOGRAM FOR BREAST CANCER: Primary | ICD-10-CM

## 2024-08-12 NOTE — TELEPHONE ENCOUNTER
----- Message from Julisa Lincoln sent at 8/12/2024 11:41 AM CDT -----  Type:  Mammogram    Caller is requesting to schedule their annual mammogram appointment.  Order is not listed in EPIC.  Please enter order and contact patient to schedule.  Name of Caller: Pt   Where would they like the mammogram performed? [facility] in [place]    Would the patient rather a call back or a response via [facility]? Call back   Best Call Back Number: [phone]

## 2024-09-05 ENCOUNTER — OFFICE VISIT (OUTPATIENT)
Dept: NEUROSURGERY | Facility: CLINIC | Age: 79
End: 2024-09-05
Payer: MEDICARE

## 2024-09-05 VITALS — TEMPERATURE: 98 F | HEART RATE: 72 BPM | DIASTOLIC BLOOD PRESSURE: 78 MMHG | SYSTOLIC BLOOD PRESSURE: 148 MMHG

## 2024-09-05 DIAGNOSIS — M41.9 SCOLIOSIS, UNSPECIFIED SCOLIOSIS TYPE, UNSPECIFIED SPINAL REGION: Primary | ICD-10-CM

## 2024-09-05 PROCEDURE — 99213 OFFICE O/P EST LOW 20 MIN: CPT | Mod: PBBFAC | Performed by: NEUROLOGICAL SURGERY

## 2024-09-05 PROCEDURE — 99213 OFFICE O/P EST LOW 20 MIN: CPT | Mod: S$PBB,,, | Performed by: NEUROLOGICAL SURGERY

## 2024-09-05 PROCEDURE — 99999 PR PBB SHADOW E&M-EST. PATIENT-LVL III: CPT | Mod: PBBFAC,,, | Performed by: NEUROLOGICAL SURGERY

## 2024-09-05 NOTE — PROGRESS NOTES
"CHIEF COMPLAINT:  1 year post op     I, Olivia Kenney, attest that this documentation has been prepared under the direction and in the presence of Pedrito Chand MD.    HPI from 2024:  Emilia Pablo is a 78 y.o.  female with a PMHx of osteoarthritis, lumbar fusion (L1-S1), and spinal fusion T10-11 for evaluation of back pain. She denies any new back pain after her surgery. She presented today in a back brace; she has been informed that she can begin to ween off the use of this. She is now able to walk with assistance of a walker, but uses a wheelchair at home for quicker mobility. She denies any balance issues, but notes feeling "wobbly" on her feet. She reports major improvement after her surgery. She is in outpatient PT. She has also been informed that she has no spinal restrictions for PT.    Interval Hx:   Today the patient presents for 1 year post op s/p T8-P extension and revision of fusion and T10-11 discectomy on 2023. She reports that she's still using her walker in order to ambulate faster, but she can also use a cane. When she's at home, she just holds onto tables or walls. She is no longer in PT (stopped 4 months ago) and expresses that she wants to go back again. She still has left-sided foot drop from her original L1-P surgery. She says she doesn't have significant back pain, but she feels some "picking" between her shoulder blades. She has occasional numbness in her left leg, but no shooting leg pains.     Patient denies any other complaints at this time.    Review of patient's allergies indicates:  No Known Allergies    Past Medical History:   Diagnosis Date    Arthritis     osteoarthritis    Hypertension     Abreu-Stickler syndrome      Past Surgical History:   Procedure Laterality Date    acdf      2 level    BACK SURGERY      laminectomy    BACK SURGERY      lumbar fusion L1-S1    CARDIAC CATHETERIZATION  2016    Normal Dr [facility]; see media note; normal     " SECTION      x2    EYE SURGERY Bilateral     laser   -   Nano's     JOINT REPLACEMENT Left 08/2013    JOINT REPLACEMENT Right 05/11/2015    SPINAL FUSION N/A 08/22/2023    Procedure: FUSION, SPINE;  Surgeon: Pedrito Chand MD;  Location: [facility] OR [address] [place] [address];  Service: Neurosurgery;  Laterality: N/A;  T8-pelvis extension and revision of fusion, T10-11 discectomy; neuromonitoring, depuy    TONSILLECTOMY      TUBAL LIGATION  1982     Family History   Problem Relation Name Age of Onset    Diabetes Mother Deonna     Heart disease Mother Deonna     Arthritis Mother Deonna     Hypertension Father Baltazar Sr.     Vision loss Father Baltazar Sr.     Cancer Sister Tana         melanoma    Heart disease Sister Leni     Heart disease Brother Baltazar     Hypertension Brother Baltazar     Kidney disease Brother Baltazar     Hypertension Brother Oliverj     Kidney disease Brother Oliverj     Diabetes Brother Oliverj     Heart disease Brother Edson     Crohn's disease Son storm     Stickler syndrome Son storm     Vision loss Son storm     No Known Problems Son ramila      Social History     Tobacco Use    Smoking status: Never     Passive exposure: Never    Smokeless tobacco: Never    Tobacco comments:     Very short period 50yrs ago   Substance Use Topics    Alcohol use: Not Currently     Comment: occasional    Drug use: Never        Review of Systems   All other systems reviewed and are negative.      OBJECTIVE:   Vital Signs:  Temp: 98.1 °F (36.7 °C) (09/05/24 1014)  Pulse: 72 (09/05/24 1014)  BP: (!) 148/78 (09/05/24 1014)    Physical Exam:    Vital signs: All nursing notes and vital signs reviewed -- afebrile, vital signs stable.  Constitutional: Patient sitting comfortably in chair. Appears well developed and well nourished.  Skin: Exposed areas are intact without abnormal markings, rashes or other lesions. Incision site is dry, clean, and intact.  HEENT: Normocephalic. Normal conjunctivae.  Cardiovascular: Normal rate  and regular rhythm.  Respiratory: Chest wall rises and falls symmetrically, without signs of respiratory distress.  Abdomen: Soft and non-tender.  Extremities: Warm and without edema. Calves supple, non-tender.  Psych/Behavior: Normal affect.    Neurological:    Mental status: Alert and oriented. Conversational and appropriate.       Cranial Nerves: VFF to confrontation. PERRL. EOMI without nystagmus. Facial STLT normal and symmetric. Strong, symmetric muscles of mastication. Facial strength full and symmetric. Hearing equal bilaterally to finger rub. Palate and uvula rise and fall normally in midline. Shoulder shrug 5/5 strength. Tongue midline.     Motor:    Upper:  Deltoids Triceps Biceps WE WF     R 5/5 5/5 5/5 5/5 5/5 5/5    L 5/5 5/5 5/5 5/5 5/5 5/5      Lower:  HF KE KF DF PF EHL    R 5/5 5/5 5/5 5/5 5/5 5/5    L 5/5 5/5 5/5 0/5 5/5 0/5     Sensory: Intact sensation to light touch in all extremities. Romberg negative.    Reflexes:          DTR: 2+ symmetrically throughout.     Burnett's: Negative.     Babinski's: Negative.     Clonus: Negative.    Cerebellar: Finger-to-nose and rapid alternating movements normal. Gait stable, fluid.    Spine:    Posture: Head well aligned over pelvis in front and side views.  No focal or global spinal deformity visible on inspection. Shoulders and hips even. No obvious leg length discrepancy. No scapula winging.    Bending: Full ROM with forward, back and lateral bending. No rib prominence with forward bend.    Cervical:      ROM: Full with flexion, extension, lateral rotation and ear-to-shoulder bend.      Midline TTP: Negative.     Spurling's test: Negative.     Lhermitte's: Negative.    Thoracic:     Midline TTP: Negative    Lumbar:     Midline TTP: Negative     Straight Leg Test: Negative     Crossed Straight Leg Test: Negative     Sciatic notch tenderness: Negative.    Other:     SI joint TTP: Negative.     Greater trochanter TTP: Negative.     Tenderness with  external/internal hip rotation: Negative.    ASSESSMENT/PLAN:     Emilia Pablo presents today for 1 year post op s/p extension of prior fusion to T8-P fusion and T10-11 discectomy. She is doing very well. She is ambulating with a cane and rolling walker. She does not need any assistance around her house. Her only complaint is a picking sensation she gets in her lower thoracic spine at time which started a few weeks ago. This is likely due to some soft tissue catching over her hardware.     The patient understands and agrees with the plan of care. All questions were answered.     1. Order outpatient PT.   2. Recommend gentle stretching for musculofascial release.   3. Follow up PRN.     I, Dr. Pedrito Chand personally performed the services described in this documentation. All medical record entries made by the cheriseibeOlivia were at my direction and in my presence. I have reviewed the chart and agree that the record reflects my personal performance and is accurate and complete.      Pedrito Chand M.D.  Department of Neurosurgery  [facility]

## 2024-09-20 DIAGNOSIS — M41.9 SCOLIOSIS, UNSPECIFIED SCOLIOSIS TYPE, UNSPECIFIED SPINAL REGION: Primary | ICD-10-CM

## 2024-09-20 DIAGNOSIS — Z98.1 S/P FUSION OF THORACIC SPINE: ICD-10-CM

## 2024-09-20 DIAGNOSIS — M48.00 SPINAL STENOSIS, UNSPECIFIED SPINAL REGION: ICD-10-CM

## 2024-09-20 DIAGNOSIS — Z98.1 HISTORY OF LUMBAR FUSION: ICD-10-CM

## 2024-10-08 ENCOUNTER — OFFICE VISIT (OUTPATIENT)
Dept: FAMILY MEDICINE | Facility: CLINIC | Age: 79
End: 2024-10-08
Payer: MEDICARE

## 2024-10-08 ENCOUNTER — HOSPITAL ENCOUNTER (OUTPATIENT)
Dept: RADIOLOGY | Facility: HOSPITAL | Age: 79
Discharge: HOME | End: 2024-10-08
Attending: PHYSICIAN ASSISTANT
Payer: MEDICARE

## 2024-10-08 VITALS
BODY MASS INDEX: 26.46 KG/M2 | TEMPERATURE: 98 F | WEIGHT: 155 LBS | HEART RATE: 78 BPM | SYSTOLIC BLOOD PRESSURE: 112 MMHG | OXYGEN SATURATION: 97 % | HEIGHT: 64 IN | DIASTOLIC BLOOD PRESSURE: 68 MMHG

## 2024-10-08 DIAGNOSIS — N30.01 ACUTE CYSTITIS WITH HEMATURIA: Primary | ICD-10-CM

## 2024-10-08 DIAGNOSIS — R10.13 EPIGASTRIC ABDOMINAL PAIN: ICD-10-CM

## 2024-10-08 DIAGNOSIS — M48.04 THORACIC SPINAL STENOSIS: ICD-10-CM

## 2024-10-08 DIAGNOSIS — Z23 NEED FOR INFLUENZA VACCINATION: ICD-10-CM

## 2024-10-08 LAB
OHS QRS DURATION: 84 MS
OHS QTC CALCULATION: 409 MS

## 2024-10-08 PROCEDURE — 76700 US EXAM ABDOM COMPLETE: CPT | Mod: TC,PN

## 2024-10-08 PROCEDURE — 99214 OFFICE O/P EST MOD 30 MIN: CPT | Mod: S$GLB,,, | Performed by: PHYSICIAN ASSISTANT

## 2024-10-08 PROCEDURE — 93005 ELECTROCARDIOGRAM TRACING: CPT | Mod: S$GLB,,, | Performed by: PHYSICIAN ASSISTANT

## 2024-10-08 PROCEDURE — 76700 US EXAM ABDOM COMPLETE: CPT | Mod: 26,,, | Performed by: RADIOLOGY

## 2024-10-08 RX ORDER — CEFDINIR 300 MG/1
300 CAPSULE ORAL 2 TIMES DAILY
Qty: 20 CAPSULE | Refills: 0 | Status: SHIPPED | OUTPATIENT
Start: 2024-10-08 | End: 2024-10-18

## 2024-10-08 RX ORDER — LIDOCAINE 50 MG/G
1 PATCH TOPICAL EVERY MORNING
COMMUNITY
Start: 2024-09-15

## 2024-10-08 NOTE — PROGRESS NOTES
Patient ID: Emilia Pablo is a 79 y.o. female.     Chief Complaint: Abdominal Pain, Nausea, and belching    79 year old female with history of thoracic spinal stenosis, 1 year s/p spinal fusion, presents to clinic with complaints of belching, nausea, and shooting right upper quadrant abdominal pain, which radiates to the right shoulder as well as left upper abdomen for 1 week. Worse associated with meals. Patient reports that during imaging for surgery last year, she was told she has gallstones. She denies CP, SOB, fever, constipation, V/D. Reports this morning, symptoms spontaneously improved.         Review of Systems  Review of Systems   Constitutional:  Negative for fever.   HENT:  Negative for ear pain and sinus pain.    Eyes:  Negative for discharge.   Respiratory:  Negative for cough and wheezing.    Cardiovascular:  Negative for chest pain and leg swelling.   Gastrointestinal:  Positive for abdominal pain and nausea. Negative for diarrhea and vomiting.   Genitourinary:  Negative for urgency.   Musculoskeletal:  Negative for myalgias.   Skin:  Negative for rash.   Neurological:  Negative for weakness and headaches.   Psychiatric/Behavioral:  Negative for depression.        Currently Medications  Current Outpatient Medications on File Prior to Visit   Medication Sig Dispense Refill    acetaminophen (TYLENOL) 650 MG TbSR Take 650 mg by mouth every 8 (eight) hours as needed (pain).      b complex vitamins capsule Take 1 capsule by mouth once daily.      denosumab (PROLIA) 60 mg/mL Syrg Inject 60 mg into the skin every 6 (six) months.      famotidine (PEPCID ORAL) Take 1 tablet by mouth daily as needed (heartburn).      fish oil-omega-3 fatty acids 300-1,000 mg capsule Take 2 g by mouth once daily.      gabapentin (NEURONTIN) 300 MG capsule TAKE 2 CAPSULES EVERY NIGHT AND 1 CAPSULE DURING THE DAY AS NEEDED FOR NERVE PAIN 270 capsule 3    LIDOcaine (LIDODERM) 5 % 1 patch every morning.      loratadine (CLARITIN)  "10 mg tablet Take 10 mg by mouth daily as needed for Allergies.      methocarbamoL (ROBAXIN) 500 MG Tab Take 1 tablet (500 mg total) by mouth every evening. 90 tablet 3    olmesartan (BENICAR) 5 MG Tab Take 1 tablet (5 mg total) by mouth once daily. 90 tablet 3    oxymetazoline (AFRIN) 0.05 % nasal spray 2 sprays by Nasal route daily as needed for Congestion.      vitamin D 1000 units Tab Take 2,000 Units by mouth once daily.      celecoxib (CELEBREX) 200 MG capsule Take 1 capsule (200 mg total) by mouth once daily. With food as needed for arthritis (Patient not taking: Reported on 10/8/2024) 90 capsule 3    [DISCONTINUED] melatonin (MELATIN) 3 mg tablet Take 2 tablets (6 mg total) by mouth nightly as needed for Insomnia. (Patient not taking: Reported on 10/8/2024)  0     No current facility-administered medications on file prior to visit.       Physical  Exam  Vitals:    10/08/24 0820   BP: 112/68   BP Location: Right arm   Patient Position: Sitting   Pulse: 78   Temp: 98.1 °F (36.7 °C)   TempSrc: Oral   SpO2: 97%   Weight: 70.3 kg (154 lb 15.7 oz)   Height: 5' 4" (1.626 m)      Body mass index is 26.6 kg/m².  Wt Readings from Last 3 Encounters:   10/08/24 70.3 kg (154 lb 15.7 oz)   07/03/24 72.2 kg (159 lb 2.8 oz)   12/20/23 69.7 kg (153 lb 12.3 oz)       Physical Exam  Vitals and nursing note reviewed.   Constitutional:       General: She is not in acute distress.     Appearance: She is not ill-appearing.   HENT:      Head: Normocephalic and atraumatic.      Right Ear: External ear normal.      Left Ear: External ear normal.      Nose: Nose normal.      Mouth/Throat:      Mouth: Mucous membranes are moist.   Eyes:      Extraocular Movements: Extraocular movements intact.      Conjunctiva/sclera: Conjunctivae normal.   Cardiovascular:      Rate and Rhythm: Normal rate and regular rhythm.      Pulses: Normal pulses.      Heart sounds: No murmur heard.  Pulmonary:      Effort: Pulmonary effort is normal. No " "respiratory distress.      Breath sounds: No wheezing.   Abdominal:      General: Bowel sounds are normal. There is no distension.      Palpations: Abdomen is soft. There is no mass.      Tenderness: There is abdominal tenderness in the right upper quadrant and epigastric area. There is no right CVA tenderness, left CVA tenderness, guarding or rebound. Negative signs include Rosas's sign.   Musculoskeletal:         General: No swelling.      Cervical back: Normal range of motion.      Thoracic back: Deformity (kyphosis) present.   Skin:     Coloration: Skin is not jaundiced.      Findings: No rash.   Neurological:      General: No focal deficit present.      Mental Status: She is alert and oriented to person, place, and time.   Psychiatric:         Mood and Affect: Mood normal.         Thought Content: Thought content normal.         Labs:    Complete Blood Count  Lab Results   Component Value Date    RBC 3.77 (L) 10/08/2024    HGB 11.3 (L) 10/08/2024    HCT 33.8 (L) 10/08/2024    MCV 90 10/08/2024    MCH 30.0 10/08/2024    MCHC 33.4 10/08/2024    RDW 12.2 10/08/2024     10/08/2024    MPV 9.3 10/08/2024    GRAN 4.6 10/08/2024    GRAN 68.5 10/08/2024    LYMPH 1.6 10/08/2024    LYMPH 23.6 10/08/2024    MONO 0.4 10/08/2024    MONO 6.2 10/08/2024    EOS 0.1 10/08/2024    BASO 0.02 10/08/2024    EOSINOPHIL 1.1 10/08/2024    BASOPHIL 0.3 10/08/2024    DIFFMETHOD Automated 10/08/2024       Comprehensive Metabolic Panel  Lab Results   Component Value Date    GLU 86 10/08/2024    BUN 14 10/08/2024    CREATININE 0.65 10/08/2024     (L) 10/08/2024    K 4.1 10/08/2024    CL 98 10/08/2024    PROT 7.5 10/08/2024    ALBUMIN 4.6 10/08/2024    BILITOT 0.6 10/08/2024    AST 31 10/08/2024    ALKPHOS 36 (L) 10/08/2024    CO2 28 10/08/2024    ALT 18 10/08/2024    ANIONGAP 6 (L) 10/08/2024       TSH  No results found for: "TSH"    Imaging:  US Abdomen Complete  Narrative: EXAMINATION:  US ABDOMEN COMPLETE    CLINICAL " HISTORY:  Epigastric pain    COMPARISON:  August 21, 2023    FINDINGS:  Bilateral hepatic cysts measuring 1.7 cm on the left and 1.3 cm in the right again seen.  The liver is otherwise normal, and measures 14 cm. The gallbladder is with multiple layering gallstones, without gallbladder wall thickening or pericholecystic fluid.  Negative for a sonographic Rosas's sign.  The common duct measures 5 mm. The right kidney measures 9.4 cm and the left kidney measures 9.5 cm.  2.1 cm left renal cyst.  Both kidneys are without other focal solid or cystic masses, hydronephrosis, perinephric fluid collections or shadowing stones. The spleen measures 9.4 cm and is normal in appearance.  The visualized portions of the pancreas are normal.  The tail of the pancreas is obscured by overlying bowel gas.    The aorta and IVC are normal in caliber.  Hepatopedal flow is documented in the portal vein. The flow velocity of the portal vein is 34 centimeters/second.    Hepatorenal index is not recorded.  Impression: 1.  Cholelithiasis without ultrasound evidence for acute cholecystitis again seen.    2.  Stable hepatic and left renal cysts.  Of note, the left renal cyst appears larger on today's study.    3.  Otherwise, normal study.    Electronically signed by: Marquis Simmons MD  Date:    10/08/2024  Time:    11:56      Assessment/Plan:    1. Acute cystitis with hematuria  Comments:  - Start cefdinir as directed  - Follow if no relief  Orders:  -     cefdinir (OMNICEF) 300 MG capsule; Take 1 capsule (300 mg total) by mouth 2 (two) times daily. for 10 days  Dispense: 20 capsule; Refill: 0    2. Epigastric abdominal pain  Comments:  - US abdomen with cholelithiasis without cholecystitis  - Follows with GI  Orders:  -     CBC auto differential; Future; Expected date: 10/08/2024  -     Comprehensive metabolic panel; Future; Expected date: 10/08/2024  -     Amylase; Future  -     LIPASE; Future; Expected date: 10/08/2024  -     US Abdomen  Complete; Future; Expected date: 10/08/2024  -     Cancel: POCT URINE DIPSTICK WITHOUT MICROSCOPE  -     IN OFFICE EKG 12-LEAD (to Muse)  -     Urinalysis, Reflex to Urine Culture Urine, Clean Catch; Future; Expected date: 10/08/2024    3. Thoracic spinal stenosis  Overview:  Emilia Pablo is a 78 y.o. female with PMH of osteoporosis- on Prolia, HTN, Abreu-Stickler Syndrome, worsening BLE weakness x 3 months who presents for evaluation of acute myelopathy.     - MRI cervical and thoracic spine shows moderate canal stenosis C4-5, T10-11 2/2 calcified disc herniation with cord signal change, multilevel foraminal stenosis.   - MRI lumbar spine with stable lumbar collection in subcutaneous tissues. No significant central stenosis. Prior MRI L spine 8/2/2023 limited evaluation 2/2 hardware artifact, but without severe canal stenosis.      POD1 T10-11 discectomy and extension/revision of prior fusion to T8-pelvis.   - continue tx for orthostasis  - f/u thoracolumbar xray  - stable for transfer to floor  - drain to gravity      4. Need for influenza vaccination  -     Discontinue: influenza (adjuvanted) (Fluad) 45 mcg/0.5 mL IM vaccine (> or = 66 yo) 0.5 mL         Discussed how to stay healthy including: diet, exercise, refraining from smoking and discussed screening exams / tests needed for age, sex and family Hx.    RTC every 6-12 months with PCP, or PRN     Samia Pugh PA-C

## 2024-10-10 ENCOUNTER — TELEPHONE (OUTPATIENT)
Dept: FAMILY MEDICINE | Facility: CLINIC | Age: 79
End: 2024-10-10
Payer: MEDICARE

## 2024-10-10 NOTE — TELEPHONE ENCOUNTER
----- Message from Samia Pugh PA-C sent at 10/10/2024 12:33 PM CDT -----  Klebsiella, sensitive to cefdinir. No change to treatment.

## 2024-11-21 ENCOUNTER — PATIENT OUTREACH (OUTPATIENT)
Dept: ADMINISTRATIVE | Facility: HOSPITAL | Age: 79
End: 2024-11-21
Payer: MEDICARE

## 2024-11-21 NOTE — PROGRESS NOTES
Population Health Chart Review & Patient Outreach Details      Additional [facility] Health Notes:               Updates Requested / Reviewed:      Updated Care Coordination Note and          Health Maintenance Topics Overdue:      VBHM Score: 0     Patient is not due for any topics at this time.    Shingles/Zoster Vaccine  RSV Vaccine                  Health Maintenance Topic(s) Outreach Outcomes & Actions Taken:    Breast Cancer Screening - Outreach Outcomes & Actions Taken  : External Records Uploaded & Care Team Updated if Applicable and mammo not active topic in HM. Mammo uploaded to media.

## 2025-01-06 ENCOUNTER — TELEPHONE (OUTPATIENT)
Dept: FAMILY MEDICINE | Facility: CLINIC | Age: 80
End: 2025-01-06
Payer: MEDICARE

## 2025-01-06 DIAGNOSIS — R33.9 URINARY RETENTION: ICD-10-CM

## 2025-01-06 DIAGNOSIS — M19.90 ARTHRITIS: Primary | ICD-10-CM

## 2025-01-06 DIAGNOSIS — D64.9 ANEMIA, UNSPECIFIED TYPE: ICD-10-CM

## 2025-01-06 DIAGNOSIS — I70.0 AORTIC ATHEROSCLEROSIS: ICD-10-CM

## 2025-01-06 DIAGNOSIS — M81.0 AGE-RELATED OSTEOPOROSIS WITHOUT CURRENT PATHOLOGICAL FRACTURE: ICD-10-CM

## 2025-01-06 DIAGNOSIS — I10 ESSENTIAL HYPERTENSION: ICD-10-CM

## 2025-01-06 NOTE — TELEPHONE ENCOUNTER
----- Message from Leah sent at 1/6/2025 10:46 AM CST -----  Type:  Needs Medical Advice    Who Called: pt  Would the patient rather a call back or a response via [facility]? call  Best Call Back Number:  [phone]  Additional Information: pt needing to reschedule 1.10 appt with office wanting to speak with office

## 2025-01-06 NOTE — TELEPHONE ENCOUNTER
Patient had cbc cmp in October 2024  Lipids in 3/ 24    She has appt to see you in Feb    Do you need any labs at this time    If so order external and rtn to nurse to fax to [facility] and notify the pt

## 2025-01-13 NOTE — ASSESSMENT & PLAN NOTE
Problem: Discharge Planning  Goal: Discharge to home or other facility with appropriate resources  1/13/2025 0533 by Saida Bowden RN  Outcome: Progressing  Flowsheets (Taken 1/12/2025 2115)  Discharge to home or other facility with appropriate resources: Identify barriers to discharge with patient and caregiver     Problem: Safety - Adult  Goal: Free from fall injury  1/13/2025 0533 by Saida Bowden, RN  Outcome: Progressing     Problem: Skin/Tissue Integrity  Goal: Absence of new skin breakdown  Description: 1.  Monitor for areas of redness and/or skin breakdown  2.  Assess vascular access sites hourly  3.  Every 4-6 hours minimum:  Change oxygen saturation probe site  4.  Every 4-6 hours:  If on nasal continuous positive airway pressure, respiratory therapy assess nares and determine need for appliance change or resting period.  1/13/2025 0533 by Saida Bowden RN  Outcome: Progressing     Problem: Pain  Goal: Verbalizes/displays adequate comfort level or baseline comfort level  1/13/2025 0533 by Saida Bowden, RN  Outcome: Progressing     Problem: ABCDS Injury Assessment  Goal: Absence of physical injury  1/13/2025 0533 by Saida Bowden, RN  Outcome: Progressing      On omelsartan at home  -SBP goal <160, MAP >65 post op no increased MAP goals   -restart home antihypertensives at home   -PRN labetalol, hydralazine

## 2025-01-24 ENCOUNTER — TELEPHONE (OUTPATIENT)
Dept: FAMILY MEDICINE | Facility: CLINIC | Age: 80
End: 2025-01-24
Payer: MEDICARE

## 2025-01-27 ENCOUNTER — TELEPHONE (OUTPATIENT)
Dept: FAMILY MEDICINE | Facility: CLINIC | Age: 80
End: 2025-01-27
Payer: MEDICARE

## 2025-01-27 LAB
CHOLEST SERPL-MCNC: 219 MG/DL
CHOLEST SERPL-MCNC: 47 MG/DL
CHOLEST/HDLC SERPL: 2.5 {RATIO}
HDLC SERPL-MCNC: 88 MG/DL
LDLC SERPL CALC-MCNC: 122 MG/DL

## 2025-01-27 NOTE — TELEPHONE ENCOUNTER
""Eric with [facility]" called to see if form was received for lumbar orthosis support. I informed him that we did not, nor did the patient request a lumbar support. Eric verbalized understanding.     [facility] [employer] Reports & Reviews    [email or web address]   [email or web address]  [facility] phone [phone]  "

## 2025-01-27 NOTE — TELEPHONE ENCOUNTER
----- Message from Rowan sent at 1/27/2025  1:35 PM CST -----  .Type:  Needs Medical Advice    Who Called: Eric with [facility]    Would the patient rather a call back or a response via [facility]? Call back  Best Call Back Number:   Additional Information:     Eric stated he is calling for the status of the fax that was sent over, please leave a voicemail if no answer

## 2025-02-03 ENCOUNTER — PATIENT OUTREACH (OUTPATIENT)
Dept: ADMINISTRATIVE | Facility: HOSPITAL | Age: 80
End: 2025-02-03
Payer: MEDICARE

## 2025-02-05 ENCOUNTER — OFFICE VISIT (OUTPATIENT)
Dept: FAMILY MEDICINE | Facility: CLINIC | Age: 80
End: 2025-02-05
Payer: MEDICARE

## 2025-02-05 VITALS
TEMPERATURE: 98 F | SYSTOLIC BLOOD PRESSURE: 100 MMHG | OXYGEN SATURATION: 98 % | HEART RATE: 76 BPM | HEIGHT: 64 IN | WEIGHT: 154.63 LBS | DIASTOLIC BLOOD PRESSURE: 70 MMHG | BODY MASS INDEX: 26.4 KG/M2

## 2025-02-05 DIAGNOSIS — F51.01 PRIMARY INSOMNIA: ICD-10-CM

## 2025-02-05 DIAGNOSIS — I10 ESSENTIAL HYPERTENSION: ICD-10-CM

## 2025-02-05 DIAGNOSIS — Q89.8: ICD-10-CM

## 2025-02-05 DIAGNOSIS — M48.04 THORACIC SPINAL STENOSIS: ICD-10-CM

## 2025-02-05 DIAGNOSIS — G95.9 ACUTE MYELOPATHY: ICD-10-CM

## 2025-02-05 DIAGNOSIS — R05.9 COUGH, UNSPECIFIED TYPE: ICD-10-CM

## 2025-02-05 DIAGNOSIS — G89.29 CHRONIC PAIN OF BOTH SHOULDERS: ICD-10-CM

## 2025-02-05 DIAGNOSIS — M25.512 CHRONIC PAIN OF BOTH SHOULDERS: ICD-10-CM

## 2025-02-05 DIAGNOSIS — R05.3 CHRONIC COUGH: ICD-10-CM

## 2025-02-05 DIAGNOSIS — M54.2 NECK PAIN: ICD-10-CM

## 2025-02-05 DIAGNOSIS — S24.103A UNSPECIFIED INJURY AT T7-T10 LEVEL OF THORACIC SPINAL CORD, INITIAL ENCOUNTER: ICD-10-CM

## 2025-02-05 DIAGNOSIS — M81.0 AGE-RELATED OSTEOPOROSIS WITHOUT CURRENT PATHOLOGICAL FRACTURE: ICD-10-CM

## 2025-02-05 DIAGNOSIS — M19.90 ARTHRITIS: ICD-10-CM

## 2025-02-05 DIAGNOSIS — I70.0 AORTIC ATHEROSCLEROSIS: ICD-10-CM

## 2025-02-05 DIAGNOSIS — M25.511 CHRONIC PAIN OF BOTH SHOULDERS: ICD-10-CM

## 2025-02-05 DIAGNOSIS — G44.89 CHRONIC MIXED HEADACHE SYNDROME: Primary | ICD-10-CM

## 2025-02-05 DIAGNOSIS — D64.9 ANEMIA, UNSPECIFIED TYPE: ICD-10-CM

## 2025-02-05 PROCEDURE — 99214 OFFICE O/P EST MOD 30 MIN: CPT | Mod: S$GLB,,, | Performed by: FAMILY MEDICINE

## 2025-02-05 NOTE — PROGRESS NOTES
"Subjective:      Patient ID: Emilia Pablo is a 79 y.o. female.    Chief Complaint: Annual Exam      Vitals:    02/05/25 1407   BP: 100/70   Pulse: 76   Temp: 98 °F (36.7 °C)   TempSrc: Oral   SpO2: 98%   Weight: 70.1 kg (154 lb 10.4 oz)   Height: 5' 4" (1.626 m)        HPI   Annual; went to urgent care one month ago at [facility]  Goes to PT for her back there; goes to neurosrugery   Had intrascapular pain after T spine fusion  Had a frozen shoulder on the right    Recent labs good at [facility] in media      Problem List  Patient Active Problem List   Diagnosis    Neck pain    Essential hypertension    Primary insomnia    Chronic mixed headache syndrome    Chronic pain of both shoulders    Arthritis    Age-related osteoporosis without current pathological fracture    Abreu-Stickler syndrome    Thoracic spinal stenosis    Urinary retention    Acute blood loss anemia    Hypocalcemia    Aortic atherosclerosis    Anemia    Acute myelopathy    Unspecified injury at T7-t10 level of thoracic spinal cord, initial encounter    Seborrheic keratosis        ALLERGIES: Review of patient's allergies indicates:  No Known Allergies    MEDS:   Current Outpatient Medications:     acetaminophen (TYLENOL) 650 MG TbSR, Take 650 mg by mouth every 8 (eight) hours as needed (pain)., Disp: , Rfl:     b complex vitamins capsule, Take 1 capsule by mouth once daily., Disp: , Rfl:     celecoxib (CELEBREX) 200 MG capsule, Take 1 capsule (200 mg total) by mouth once daily. With food as needed for arthritis, Disp: 90 capsule, Rfl: 3    denosumab (PROLIA) 60 mg/mL Syrg, Inject 60 mg into the skin every 6 (six) months., Disp: , Rfl:     famotidine (PEPCID ORAL), Take 1 tablet by mouth daily as needed (heartburn)., Disp: , Rfl:     fish oil-omega-3 fatty acids 300-1,000 mg capsule, Take 2 g by mouth once daily., Disp: , Rfl:     gabapentin (NEURONTIN) 300 MG capsule, TAKE 2 CAPSULES EVERY NIGHT AND 1 CAPSULE DURING THE DAY AS NEEDED FOR NERVE PAIN, " Disp: 270 capsule, Rfl: 3    loratadine (CLARITIN) 10 mg tablet, Take 10 mg by mouth daily as needed for Allergies., Disp: , Rfl:     methocarbamoL (ROBAXIN) 500 MG Tab, Take 1 tablet (500 mg total) by mouth every evening., Disp: 90 tablet, Rfl: 3    olmesartan (BENICAR) 5 MG Tab, Take 1 tablet (5 mg total) by mouth once daily., Disp: 90 tablet, Rfl: 3    oxymetazoline (AFRIN) 0.05 % nasal spray, 2 sprays by Nasal route daily as needed for Congestion., Disp: , Rfl:     vitamin D 1000 units Tab, Take 2,000 Units by mouth once daily., Disp: , Rfl:     LIDOcaine (LIDODERM) 5 %, 1 patch every morning., Disp: , Rfl:       History:  Current Providers as of 2025  PCP: Dave Gibson MD  Care Team Provider: Ayush Juan MD  Care Team Provider: Marquis Aviles MD  Care Team Provider: Lionel Gandhi MD  Care Team Provider: Lesa Bowling LPN  Care Team Provider: Pedrito Chand MD  Encounter Provider: Dave Gibson MD, starting on  12:00 AM  Referring Provider: not found, starting on  12:00 AM  Consulting Physician: Dave Gibson MD, starting on   2:04 PM (Active)   Past Medical History:   Diagnosis Date    Arthritis     osteoarthritis    Hypertension     Abreu-Stickler syndrome      Past Surgical History:   Procedure Laterality Date    acdf      2 level    BACK SURGERY      laminectomy    BACK SURGERY      lumbar fusion L1-S1    CARDIAC CATHETERIZATION  2016    Normal Dr Wright; see media note; normal     SECTION      x2    EYE SURGERY Bilateral     laser   -   Nano's     JOINT REPLACEMENT Left 2013    JOINT REPLACEMENT Right 2015    SPINAL FUSION N/A 2023    Procedure: FUSION, SPINE;  Surgeon: Pedrito Chand MD;  Location: [facility] OR [address] [place] [address];  Service: Neurosurgery;  Laterality: N/A;  T8-pelvis extension and revision of fusion, T10-11 discectomy; neuromonitoring, depuy    TONSILLECTOMY      TUBAL  LIGATION  1982     Social History     Tobacco Use    Smoking status: Never     Passive exposure: Never    Smokeless tobacco: Never    Tobacco comments:     Very short period 50yrs ago   Substance Use Topics    Alcohol use: Not Currently     Comment: occasional    Drug use: Never         Review of Systems   Constitutional: Negative.    HENT: Negative.     Respiratory:  Positive for cough.    Cardiovascular: Negative.    Gastrointestinal: Negative.    Endocrine: Negative.    Genitourinary: Negative.    Musculoskeletal:  Positive for arthralgias, back pain and gait problem.   Psychiatric/Behavioral: Negative.     All other systems reviewed and are negative.    Objective:     Physical Exam  Vitals and nursing note reviewed.   Constitutional:       Appearance: She is well-developed.   HENT:      Head: Normocephalic.   Eyes:      Conjunctiva/sclera: Conjunctivae normal.      Pupils: Pupils are equal, round, and reactive to light.   Cardiovascular:      Rate and Rhythm: Normal rate and regular rhythm.      Heart sounds: Normal heart sounds.   Pulmonary:      Effort: Pulmonary effort is normal. No respiratory distress.      Breath sounds: Normal breath sounds. No stridor. No wheezing, rhonchi or rales.   Musculoskeletal:         General: Normal range of motion.      Cervical back: Normal range of motion and neck supple.   Skin:     General: Skin is warm and dry.   Neurological:      General: No focal deficit present.      Mental Status: She is alert and oriented to person, place, and time. Mental status is at baseline.      Deep Tendon Reflexes: Reflexes are normal and symmetric.   Psychiatric:         Mood and Affect: Mood normal.         Behavior: Behavior normal.         Thought Content: Thought content normal.         Judgment: Judgment normal.             Assessment:     1. Chronic mixed headache syndrome    2. Acute myelopathy    3. Unspecified injury at T7-t10 level of thoracic spinal cord, initial encounter    4.  Aortic atherosclerosis    5. Thoracic spinal stenosis      Plan:        Medication List            Accurate as of February 5, 2025  2:30 PM. If you have any questions, ask your nurse or doctor.                CONTINUE taking these medications      acetaminophen 650 MG Tbsr  Commonly known as: TYLENOL     b complex vitamins capsule     celecoxib 200 MG capsule  Commonly known as: CeleBREX  Take 1 capsule (200 mg total) by mouth once daily. With food as needed for arthritis     fish oil-omega-3 fatty acids 300-1,000 mg capsule     gabapentin 300 MG capsule  Commonly known as: NEURONTIN  TAKE 2 CAPSULES EVERY NIGHT AND 1 CAPSULE DURING THE DAY AS NEEDED FOR NERVE PAIN     LIDOcaine 5 %  Commonly known as: LIDODERM     loratadine 10 mg tablet  Commonly known as: CLARITIN     methocarbamoL 500 MG Tab  Commonly known as: Robaxin  Take 1 tablet (500 mg total) by mouth every evening.     olmesartan 5 MG Tab  Commonly known as: BENICAR  Take 1 tablet (5 mg total) by mouth once daily.     oxymetazoline 0.05 % nasal spray  Commonly known as: AFRIN     PEPCID ORAL     PROLIA 60 mg/mL Syrg  Generic drug: denosumab     vitamin D 1000 units Tab  Commonly known as: VITAMIN D3            Chronic mixed headache syndrome    Acute myelopathy    Unspecified injury at T7-t10 level of thoracic spinal cord, initial encounter    Aortic atherosclerosis    Thoracic spinal stenosis    Chest CT and pulmonology due to chronic cough; non smoker; ? bronchiectasis

## 2025-02-18 ENCOUNTER — HOSPITAL ENCOUNTER (OUTPATIENT)
Dept: RADIOLOGY | Facility: HOSPITAL | Age: 80
Discharge: HOME | End: 2025-02-18
Attending: FAMILY MEDICINE
Payer: MEDICARE

## 2025-02-18 DIAGNOSIS — R05.9 COUGH, UNSPECIFIED TYPE: ICD-10-CM

## 2025-02-18 PROCEDURE — 71250 CT THORAX DX C-: CPT | Mod: TC,PN

## 2025-02-21 ENCOUNTER — TELEPHONE (OUTPATIENT)
Dept: FAMILY MEDICINE | Facility: CLINIC | Age: 80
End: 2025-02-21
Payer: MEDICARE

## 2025-02-21 NOTE — TELEPHONE ENCOUNTER
----- Message from Berhane sent at 2/21/2025 10:57 AM CST -----  Contact: pt  Type: Requesting to speak with Heidy Called: Ciro:would like to know if she should keep her PULMONARY appointment since all her scans were normal. Would the patient rather a call back or a response via [facility]? Call [facility] Call Back Number: [phone] Additional Information:

## 2025-02-23 ENCOUNTER — RESULTS FOLLOW-UP (OUTPATIENT)
Dept: FAMILY MEDICINE | Facility: CLINIC | Age: 80
End: 2025-02-23

## 2025-02-24 DIAGNOSIS — Z00.00 ENCOUNTER FOR MEDICARE ANNUAL WELLNESS EXAM: ICD-10-CM

## 2025-03-04 ENCOUNTER — RESULTS FOLLOW-UP (OUTPATIENT)
Dept: FAMILY MEDICINE | Facility: CLINIC | Age: 80
End: 2025-03-04

## 2025-07-09 DIAGNOSIS — Z78.0 MENOPAUSE: ICD-10-CM

## 2025-07-11 ENCOUNTER — TELEPHONE (OUTPATIENT)
Dept: FAMILY MEDICINE | Facility: CLINIC | Age: 80
End: 2025-07-11
Payer: MEDICARE

## 2025-07-11 NOTE — TELEPHONE ENCOUNTER
Copied from CRM #[id number]. Topic: General Inquiry - Return Call  >> Jul 11, 2025 11:17 AM Franklin wrote:  Type:  Patient Returning Call    Who Called:pt  Who Left Message for Patient:office  Does the patient know what this is regarding?:returning call   Would the patient rather a call back or a response via [facility]? call  Best Call Back Number:[phone]  Additional Information:

## 2025-07-11 NOTE — TELEPHONE ENCOUNTER
I have informed pt that MD on 7/9/25 has placed orders for a dxa scan. Pt is asking why MD has placed this order. Pt states she see rheumatologist.

## 2025-07-14 ENCOUNTER — HOSPITAL ENCOUNTER (OUTPATIENT)
Dept: RADIOLOGY | Facility: HOSPITAL | Age: 80
Discharge: HOME | End: 2025-07-14
Attending: PHYSICIAN ASSISTANT
Payer: MEDICARE

## 2025-07-14 ENCOUNTER — OFFICE VISIT (OUTPATIENT)
Dept: FAMILY MEDICINE | Facility: CLINIC | Age: 80
End: 2025-07-14
Payer: MEDICARE

## 2025-07-14 ENCOUNTER — TELEPHONE (OUTPATIENT)
Dept: FAMILY MEDICINE | Facility: CLINIC | Age: 80
End: 2025-07-14

## 2025-07-14 VITALS
HEART RATE: 79 BPM | TEMPERATURE: 98 F | HEIGHT: 64 IN | DIASTOLIC BLOOD PRESSURE: 62 MMHG | SYSTOLIC BLOOD PRESSURE: 120 MMHG | OXYGEN SATURATION: 99 % | BODY MASS INDEX: 27.38 KG/M2 | WEIGHT: 160.38 LBS

## 2025-07-14 DIAGNOSIS — M70.62 GREATER TROCHANTERIC BURSITIS OF LEFT HIP: Primary | ICD-10-CM

## 2025-07-14 DIAGNOSIS — Z96.642 HISTORY OF LEFT HIP REPLACEMENT: ICD-10-CM

## 2025-07-14 DIAGNOSIS — M25.552 LEFT HIP PAIN: ICD-10-CM

## 2025-07-14 DIAGNOSIS — M81.0 AGE-RELATED OSTEOPOROSIS WITHOUT CURRENT PATHOLOGICAL FRACTURE: ICD-10-CM

## 2025-07-14 PROCEDURE — 20610 DRAIN/INJ JOINT/BURSA W/O US: CPT | Mod: LT,S$GLB,, | Performed by: PHYSICIAN ASSISTANT

## 2025-07-14 PROCEDURE — 73502 X-RAY EXAM HIP UNI 2-3 VIEWS: CPT | Mod: TC,FY,PN,LT

## 2025-07-14 PROCEDURE — 73502 X-RAY EXAM HIP UNI 2-3 VIEWS: CPT | Mod: 26,LT,, | Performed by: RADIOLOGY

## 2025-07-14 PROCEDURE — 99214 OFFICE O/P EST MOD 30 MIN: CPT | Mod: 25,S$GLB,, | Performed by: PHYSICIAN ASSISTANT

## 2025-07-14 RX ORDER — TRIAMCINOLONE ACETONIDE 40 MG/ML
80 INJECTION, SUSPENSION INTRA-ARTICULAR; INTRAMUSCULAR
Status: DISCONTINUED | OUTPATIENT
Start: 2025-07-14 | End: 2025-07-20 | Stop reason: HOSPADM

## 2025-07-14 RX ADMIN — TRIAMCINOLONE ACETONIDE 80 MG: 40 INJECTION, SUSPENSION INTRA-ARTICULAR; INTRAMUSCULAR at 01:07

## 2025-07-14 NOTE — TELEPHONE ENCOUNTER
Called patient no answer; LVM to return call to clinic to discuss results; advised xray results are available on portal for review    ----- Message from Samia Pugh PA-C sent at 7/14/2025  3:24 PM CDT -----  Please call patient and inform them that imaging is normal  ----- Message -----  From: Interface, Rad Results In  Sent: 7/14/2025   3:15 PM CDT  To: Samia Pugh PA-C

## 2025-07-14 NOTE — PROCEDURES
Large Joint Aspiration/Injection: L greater trochanteric bursa    Date/Time: 7/14/2025 1:00 PM    Performed by: Samia Pugh PA-C  Authorized by: Samia Pugh PA-C    Consent Done?:  Yes (Verbal)  Indications:  Pain  Site marked: the procedure site was marked    Timeout: prior to procedure the correct patient, procedure, and site was verified    Local anesthesia used?: No      Details:  Needle Size:  25 G  Ultrasonic Guidance for needle placement?: No    Approach:  Lateral  Location:  Hip  Site:  L greater trochanteric bursa  Medications:  80 mg triamcinolone acetonide 40 mg/mL  Aspirate amount (mL):  0  Patient tolerance:  Patient tolerated the procedure well with no immediate complications

## 2025-07-14 NOTE — PROGRESS NOTES
Patient ID: Emilia Pablo is a 80 y.o. female.     Chief Complaint: Hip Pain    Ms. Pablo is a 80 year old female with history of chronic back pain with fusions, s/p bilateral hip replacements who presents today for hip pain suspected to be bursitis    She reports left hip pain present for 3 weeks. The pain is exacerbated by prolonged sitting and is most problematic at night, particularly when sleeping on her left side. She describes the pain as feeling deep and potentially related to bursitis. She denies any history of falls or trauma as a precipitating factor. The pain interferes with sleep and is worse when sitting for extended periods. She has been self-treating with heat application, Icy Hot, and her ongoing Celebrex medication.    She has a history of multiple joint and spine surgeries including left hip replacement in 2013 performed by Dr. Hankins, right hip replacement, and comprehensive spinal fusion involving lumbar, thoracic, and cervical regions. She notes having significant hardware from these procedures and appears stable post-surgeries.    She takes Celebrex 200 mg long-term for years as an anti-inflammatory medication. She notes the medication helps manage morning symptoms and allows for increased activity level.    She had an urgent care visit on June 8th for an allergic reaction characterized by widespread hives. She received a cortisone injection to manage the allergic reaction. The cause of the allergic reaction remains unknown.         Review of Systems  Review of Systems   Constitutional:  Negative for fever.   HENT:  Negative for ear pain and sinus pain.    Eyes:  Negative for discharge.   Respiratory:  Negative for cough and wheezing.    Cardiovascular:  Negative for chest pain and leg swelling.   Gastrointestinal:  Negative for diarrhea, nausea and vomiting.   Genitourinary:  Negative for urgency.   Musculoskeletal:  Positive for joint pain. Negative for myalgias.   Skin:  Negative for  "rash.   Neurological:  Negative for weakness and headaches.   Psychiatric/Behavioral:  Negative for depression.        Currently Medications  Medications Ordered Prior to Encounter[1]    Physical  Exam  Vitals:    07/14/25 1301   BP: 120/62   BP Location: Left arm   Patient Position: Sitting   Pulse: 79   Temp: 97.8 °F (36.6 °C)   SpO2: 99%   Weight: 72.7 kg (160 lb 6.2 oz)   Height: 5' 4" (1.626 m)      Body mass index is 27.53 kg/m².  Wt Readings from Last 3 Encounters:   07/14/25 72.7 kg (160 lb 6.2 oz)   02/05/25 70.1 kg (154 lb 10.4 oz)   10/08/24 70.3 kg (154 lb 15.7 oz)       Physical Exam  Vitals and nursing note reviewed.   Constitutional:       General: She is not in acute distress.     Appearance: She is not ill-appearing.   HENT:      Head: Normocephalic and atraumatic.      Right Ear: External ear normal.      Left Ear: External ear normal.      Nose: Nose normal.      Mouth/Throat:      Mouth: Mucous membranes are moist.   Eyes:      Extraocular Movements: Extraocular movements intact.      Conjunctiva/sclera: Conjunctivae normal.   Cardiovascular:      Rate and Rhythm: Normal rate and regular rhythm.      Pulses: Normal pulses.      Heart sounds: No murmur heard.  Pulmonary:      Effort: Pulmonary effort is normal. No respiratory distress.      Breath sounds: No wheezing.   Abdominal:      General: There is no distension.      Palpations: Abdomen is soft. There is no mass.      Tenderness: There is no abdominal tenderness.   Musculoskeletal:         General: No swelling.      Cervical back: Normal range of motion.      Left hip: Bony tenderness (greater trochanter) present.   Skin:     Coloration: Skin is not jaundiced.      Findings: No rash.   Neurological:      General: No focal deficit present.      Mental Status: She is alert and oriented to person, place, and time.   Psychiatric:         Mood and Affect: Mood normal.         Thought Content: Thought content normal.         Labs:    Complete " "Blood Count  Lab Results   Component Value Date    RBC 3.77 (L) 10/08/2024    HGB 11.3 (L) 10/08/2024    HCT 33.8 (L) 10/08/2024    MCV 90 10/08/2024    MCH 30.0 10/08/2024    MCHC 33.4 10/08/2024    RDW 12.2 10/08/2024     10/08/2024    MPV 9.3 10/08/2024    GRAN 4.6 10/08/2024    GRAN 68.5 10/08/2024    LYMPH 1.6 10/08/2024    LYMPH 23.6 10/08/2024    MONO 0.4 10/08/2024    MONO 6.2 10/08/2024    EOS 0.1 10/08/2024    BASO 0.02 10/08/2024    EOSINOPHIL 1.1 10/08/2024    BASOPHIL 0.3 10/08/2024    DIFFMETHOD Automated 10/08/2024       Comprehensive Metabolic Panel  No results found for: "GLU", "BUN", "CREATININE", "NA", "K", "CL", "PROT", "ALBUMIN", "BILITOT", "AST", "ALKPHOS", "CO2", "ALT", "ANIONGAP", "EGFRNONAA", "ESTGFRAFRICA"    TSH  No results found for: "TSH"    Imaging:  X-Ray Hip 2 or 3 views Left with Pelvis when performed  Narrative: EXAMINATION:  XR HIP WITH PELVIS WHEN PERFORMED 2 OR 3 VIEWS LEFT    CLINICAL HISTORY:  Pain in left hip    COMPARISON:  08/21/2023    FINDINGS:  There is total prosthetic hardware in both hips.  There is no evidence of loosening.  There is no fracture. There is no dislocation.  There is partial visualization of posterior spinal fusion hardware between the thoracic spine and the sacrum.  Impression: 1. There is total prosthetic hardware in both hips. There is no evidence of loosening.  2. There is partial visualization of posterior spinal fusion hardware between the thoracic spine and the sacrum.    Electronically signed by: Evens Verma MD  Date:    07/14/2025  Time:    15:13      Assessment/Plan:    1. Greater trochanteric bursitis of left hip  -     Large Joint Aspiration/Injection: L greater trochanteric bursa  -     triamcinolone acetonide injection 80 mg    2. Left hip pain  -     X-Ray Hip 2 or 3 views Left with Pelvis when performed; Future; Expected date: 07/14/2025  -     Large Joint Aspiration/Injection: L greater trochanteric bursa  -     triamcinolone " acetonide injection 80 mg    3. History of left hip replacement  -     X-Ray Hip 2 or 3 views Left with Pelvis when performed; Future; Expected date: 07/14/2025    4. Age-related osteoporosis without current pathological fracture  -     DXA Bone Density Axial Skeleton 1 or more sites; Future; Expected date: 07/14/2025       Assessment & Plan    M25.552 Pain in left hip  M70.62 Trochanteric bursitis, left hip  Z96.642 Presence of left artificial hip joint  Z96.641 Presence of right artificial hip joint  Z98.1 Arthrodesis status  Z79.1 Long term (current) use of non-steroidal anti-inflammatories (NSAID)  L50.9 Urticaria, unspecified    PAIN IN LEFT HIP:  - Assessed hip pain based on location and symptoms.  - Ms. Pablo reports pain in the left hip, especially when sitting for long periods or at night, and when applying pressure.  - Ms. Pablo describes the pain as deeper than previous bursitis and feels it might be related to the joint or limb.  - Planned to administer a cortisone injection for the hip pain.  - Recommend using a donut pillow or cushion with a hole in the center for extra hip support while sleeping or sitting.  - Ms. Pablo should look on [employer] for a squishy, memory foam donut pillow.    TROCHANTERIC BURSITIS, LEFT HIP:  - Suspected bursitis based on location and symptoms.  - Ms. Pablo reports pain on the lateral aspect of the hip, similar to previous bursitis episodes.  - Discussed the possibility of bursitis and considered an x-ray to confirm diagnosis and evaluate if the pain is deeper than typical bursitis.  - Administered cortisone injection into the hip joint area without prior radiograph.    PRESENCE OF LEFT ARTIFICIAL HIP JOINT:  - Ms. Pablo has a left artificial hip joint, replaced in 2013.  - Ms. Pablo reports no issues with the artificial joint itself, suspects bursitis instead.    PRESENCE OF RIGHT ARTIFICIAL HIP JOINT:  - Ms. Pablo has a right artificial hip joint with no issues  reported.    ARTHRODESIS STATUS:  - Ms. Pablo has extensive metal in the spine due to fusion from lumbar, thoracic, and cervical regions.  - Due to this extensive spinal fusion, forearm DEXA scan will be used instead of standard hip and spine imaging.  - Explained that DEXA scan is essentially an XR, primarily focusing on lower spine and hips.  - Ordered DEXA scan as per Dr. Gibson's recommendation.    LONG TERM USE OF NSAIDS:  - Ms. Pablo has been on Celebrex for years for anti-inflammatory purposes and finds it effective in reducing inflammation and pain in the morning.    URTICARIA:  - Ms. Pablo experienced an allergic reaction with urticaria 1 month ago, treated at urgent care.  - Administered a cortisone injection for the allergic reaction.          Discussed how to stay healthy including: diet, exercise, refraining from smoking and discussed screening exams / tests needed for age, sex and family Hx.    This note was generated with the assistance of ambient listening technology. Verbal consent was obtained by the patient and accompanying visitor(s) for the recording of patient appointment to facilitate this note. I attest to having reviewed and edited the generated note for accuracy, though some syntax or spelling errors may persist. Please contact the author of this note for any clarification.       RTC every 3-6 months with PCP, or PRN      Samia Pugh PA-C             [1]   Current Outpatient Medications on File Prior to Visit   Medication Sig Dispense Refill    acetaminophen (TYLENOL) 650 MG TbSR Take 650 mg by mouth every 8 (eight) hours as needed (pain).      b complex vitamins capsule Take 1 capsule by mouth once daily.      denosumab (PROLIA) 60 mg/mL Syrg Inject 60 mg into the skin every 6 (six) months.      famotidine (PEPCID ORAL) Take 1 tablet by mouth daily as needed (heartburn).      fish oil-omega-3 fatty acids 300-1,000 mg capsule Take 2 g by mouth once daily.      gabapentin (NEURONTIN)  300 MG capsule TAKE 2 CAPSULES EVERY NIGHT AND 1 CAPSULE DURING THE DAY AS NEEDED FOR NERVE PAIN 270 capsule 3    loratadine (CLARITIN) 10 mg tablet Take 10 mg by mouth daily as needed for Allergies.      methocarbamoL (ROBAXIN) 500 MG Tab Take 1 tablet (500 mg total) by mouth every evening. 90 tablet 3    oxymetazoline (AFRIN) 0.05 % nasal spray 2 sprays by Nasal route daily as needed for Congestion.      vitamin D 1000 units Tab Take 2,000 Units by mouth once daily.      LIDOcaine (LIDODERM) 5 % 1 patch every morning.       No current facility-administered medications on file prior to visit.

## 2025-07-15 DIAGNOSIS — I10 ESSENTIAL HYPERTENSION: ICD-10-CM

## 2025-07-15 RX ORDER — OLMESARTAN MEDOXOMIL 5 MG/1
5 TABLET, FILM COATED ORAL
Qty: 90 TABLET | Refills: 0 | Status: SHIPPED | OUTPATIENT
Start: 2025-07-15

## 2025-07-15 NOTE — TELEPHONE ENCOUNTER
Care Due:                  Date            Visit Type   Department     Provider  --------------------------------------------------------------------------------                                EP -                              PRIMARY      [facility] FAM[facility]  Last Visit: 02-      CARE ([facility])   BEENA Gibson                              EP -                              PRIMARY      [facility]  Next Visit: 02-      CARE ([facility])   BEENA Gibson                                                            Last  Test          Frequency    Reason                     Performed    Due Date  --------------------------------------------------------------------------------    CBC.........  12 months..  celecoxib................  10-   10-    CMP.........  12 months..  celecoxib, olmesartan....  10-   10-    [facility] Embedded Care Due Messages. Reference number: [id number].   7/15/2025 3:30:32 PM CDT

## 2025-07-16 NOTE — TELEPHONE ENCOUNTER
Provider Staff:  Action required for this patient    Requires labs      Please see care gap opportunities below in Care Due Message.    Thanks!  [facility]     Appointments      Date Provider   Last Visit   2/5/2025 Dave Gibson MD   Next Visit   Visit date not found Dave Gibson MD     Refill Decision Note   Emilia Pablo  is requesting a refill authorization.  Brief Assessment and Rationale for Refill:  Approve     Medication Therapy Plan:        Comments:     Note composed:11:16 PM 07/15/2025

## 2025-07-17 DIAGNOSIS — M19.90 ARTHRITIS: Primary | ICD-10-CM

## 2025-07-17 NOTE — TELEPHONE ENCOUNTER
No care due was identified.  [facility] Embedded Care Due Messages. Reference number: [id number].   7/17/2025 4:57:25 PM CDT

## 2025-07-18 RX ORDER — CELECOXIB 200 MG/1
CAPSULE ORAL
Qty: 90 CAPSULE | Refills: 3 | Status: SHIPPED | OUTPATIENT
Start: 2025-07-18

## 2025-07-22 RX ORDER — METHOCARBAMOL 500 MG/1
500 TABLET, FILM COATED ORAL NIGHTLY
Qty: 90 TABLET | Refills: 1
Start: 2025-07-22

## 2025-08-19 ENCOUNTER — TELEPHONE (OUTPATIENT)
Dept: FAMILY MEDICINE | Facility: CLINIC | Age: 80
End: 2025-08-19
Payer: MEDICARE

## 2025-08-19 ENCOUNTER — HOSPITAL ENCOUNTER (OUTPATIENT)
Dept: RADIOLOGY | Facility: HOSPITAL | Age: 80
Discharge: HOME | End: 2025-08-19
Attending: PHYSICIAN ASSISTANT
Payer: MEDICARE

## 2025-08-19 DIAGNOSIS — M81.0 AGE-RELATED OSTEOPOROSIS WITHOUT CURRENT PATHOLOGICAL FRACTURE: ICD-10-CM

## 2025-08-19 PROCEDURE — 77080 DXA BONE DENSITY AXIAL: CPT | Mod: TC,PN

## 2025-08-19 PROCEDURE — 77080 DXA BONE DENSITY AXIAL: CPT | Mod: 26,,, | Performed by: RADIOLOGY

## 2025-08-19 RX ORDER — METHOCARBAMOL 500 MG/1
500 TABLET, FILM COATED ORAL NIGHTLY
Qty: 90 TABLET | Refills: 3
Start: 2025-08-19 | End: 2025-08-23 | Stop reason: SDUPTHER

## 2025-08-23 RX ORDER — METHOCARBAMOL 500 MG/1
500 TABLET, FILM COATED ORAL NIGHTLY
Qty: 270 TABLET | Refills: 0 | Status: SHIPPED | OUTPATIENT
Start: 2025-08-23

## (undated) DEVICE — SUT VICRYL PLUS 0 CT1 18IN

## (undated) DEVICE — DRAPE C-ARM ELAS CLIP 42X120IN

## (undated) DEVICE — DRAPE STERI INSTRUMENT 1018

## (undated) DEVICE — Device

## (undated) DEVICE — DRAIN HEMOVAC 3/16 LARGE

## (undated) DEVICE — DRAPE INCISE IOBAN 2 23X17IN

## (undated) DEVICE — DRESSING SURGICAL 1/2X1/2

## (undated) DEVICE — TRAY CATH FOL SIL URIMTR 16FR

## (undated) DEVICE — ELECTRODE BLADE INSULATED 1 IN

## (undated) DEVICE — HEMOSTAT SURGICEL 4X8IN

## (undated) DEVICE — TUBE FRAZIER 5MM 2FT SOFT TIP

## (undated) DEVICE — BUR BONE CUT MICRO TPS 3X3.8MM

## (undated) DEVICE — DRAPE T THYROID STERILE

## (undated) DEVICE — SUT CTD VICRYL 2-0 CR/CT-2

## (undated) DEVICE — NDL 22GA X1 1/2 REG BEVEL

## (undated) DEVICE — CORD BIPOLAR 12 FOOT

## (undated) DEVICE — MARKER SKIN STND TIP BLUE BARR

## (undated) DEVICE — TAP 6MM SPINAL POWER

## (undated) DEVICE — TAPE CURAD SILK ADH 3INX10YD

## (undated) DEVICE — KIT SURGIFLO HEMOSTATIC MATRIX

## (undated) DEVICE — PACK ECLIPSE SET-UP W/O DRAPE

## (undated) DEVICE — STAPLER SKIN ROTATING HEAD

## (undated) DEVICE — BLADE 4IN EDGE INSULATED

## (undated) DEVICE — COVER PROXIMA MAYO STAND

## (undated) DEVICE — TAP 5MM SPINAL POWER

## (undated) DEVICE — TRAY NEURO OMC

## (undated) DEVICE — ELECTRODE REM PLYHSV RETURN 9

## (undated) DEVICE — DRIVER HEX BIT MALE 3.5MM

## (undated) DEVICE — BURR RND FLUT SFT TOUCH 2.0MM

## (undated) DEVICE — SUT STRATAFIX PDS PLUS VIO

## (undated) DEVICE — SUT ETHIBOND 0 CR/CT-1 8-18

## (undated) DEVICE — CANNULA EXPEDIUM OPN 16GX160MM

## (undated) DEVICE — KIT PREVENA PLUS